# Patient Record
Sex: MALE | Race: WHITE | NOT HISPANIC OR LATINO | Employment: OTHER | URBAN - NONMETROPOLITAN AREA
[De-identification: names, ages, dates, MRNs, and addresses within clinical notes are randomized per-mention and may not be internally consistent; named-entity substitution may affect disease eponyms.]

---

## 2023-03-02 ENCOUNTER — TRANSCRIBE ORDERS (OUTPATIENT)
Dept: ADMINISTRATIVE | Facility: HOSPITAL | Age: 68
End: 2023-03-02
Payer: MEDICARE

## 2023-03-02 DIAGNOSIS — R10.11 RUQ PAIN: Primary | ICD-10-CM

## 2023-03-16 ENCOUNTER — TRANSCRIBE ORDERS (OUTPATIENT)
Dept: ADMINISTRATIVE | Facility: HOSPITAL | Age: 68
End: 2023-03-16
Payer: MEDICARE

## 2023-03-16 DIAGNOSIS — Z82.49 FAMILY HISTORY OF ISCHEMIC HEART DISEASE: Primary | ICD-10-CM

## 2023-03-16 DIAGNOSIS — E78.5 HYPERLIPIDEMIA, UNSPECIFIED HYPERLIPIDEMIA TYPE: ICD-10-CM

## 2023-03-16 DIAGNOSIS — R93.1 ELEVATED CORONARY ARTERY CALCIUM SCORE: ICD-10-CM

## 2023-03-21 ENCOUNTER — TRANSCRIBE ORDERS (OUTPATIENT)
Dept: ADMINISTRATIVE | Facility: HOSPITAL | Age: 68
End: 2023-03-21
Payer: MEDICARE

## 2023-03-21 ENCOUNTER — HOSPITAL ENCOUNTER (OUTPATIENT)
Dept: NUCLEAR MEDICINE | Facility: HOSPITAL | Age: 68
Discharge: HOME | End: 2023-03-21
Payer: MEDICARE

## 2023-03-21 DIAGNOSIS — R06.02 SHORTNESS OF BREATH: ICD-10-CM

## 2023-03-21 DIAGNOSIS — I25.10 DISEASE OF CARDIOVASCULAR SYSTEM: Primary | ICD-10-CM

## 2023-03-21 DIAGNOSIS — R10.11 RUQ PAIN: ICD-10-CM

## 2023-03-21 PROCEDURE — 78226 HEPATOBILIARY SYSTEM IMAGING: CPT

## 2023-03-21 PROCEDURE — 0 TECHNETIUM TC 99M MEBROFENIN KIT: Performed by: PHYSICIAN ASSISTANT

## 2023-03-21 PROCEDURE — A9537 TC99M MEBROFENIN: HCPCS | Performed by: PHYSICIAN ASSISTANT

## 2023-03-21 RX ORDER — KIT FOR THE PREPARATION OF TECHNETIUM TC 99M MEBROFENIN 45 MG/10ML
1 INJECTION, POWDER, LYOPHILIZED, FOR SOLUTION INTRAVENOUS
Status: COMPLETED | OUTPATIENT
Start: 2023-03-21 | End: 2023-03-21

## 2023-03-21 RX ADMIN — MEBROFENIN 1 DOSE: 45 INJECTION, POWDER, LYOPHILIZED, FOR SOLUTION INTRAVENOUS at 12:38

## 2023-04-11 ENCOUNTER — HOSPITAL ENCOUNTER (OUTPATIENT)
Dept: CARDIOLOGY | Facility: HOSPITAL | Age: 68
Discharge: HOME | End: 2023-04-11
Payer: MEDICARE

## 2023-04-11 VITALS
DIASTOLIC BLOOD PRESSURE: 94 MMHG | WEIGHT: 235.67 LBS | HEART RATE: 62 BPM | BODY MASS INDEX: 34.91 KG/M2 | SYSTOLIC BLOOD PRESSURE: 157 MMHG | HEIGHT: 69 IN

## 2023-04-11 LAB
BH CV NUCLEAR PRIOR STUDY: 3
BH CV REST NUCLEAR ISOTOPE DOSE: 11.3 MCI
BH CV STRESS BP STAGE 1: NORMAL
BH CV STRESS COMMENTS STAGE 1: NORMAL
BH CV STRESS DOSE REGADENOSON STAGE 1: 0.4
BH CV STRESS DURATION MIN STAGE 1: 0
BH CV STRESS DURATION SEC STAGE 1: 10
BH CV STRESS HR STAGE 1: 88
BH CV STRESS NUCLEAR ISOTOPE DOSE: 31.5 MCI
BH CV STRESS PROTOCOL 1: NORMAL
BH CV STRESS RECOVERY BP: NORMAL MMHG
BH CV STRESS RECOVERY HR: 84 BPM
BH CV STRESS STAGE 1: 1
LV EF NUC BP: 53 %
MAXIMAL PREDICTED HEART RATE: 153 BPM
PERCENT MAX PREDICTED HR: 57.52 %
STRESS BASELINE BP: NORMAL MMHG
STRESS BASELINE HR: 76 BPM
STRESS PERCENT HR: 68 %
STRESS POST EXERCISE DUR MIN: 0 MIN
STRESS POST EXERCISE DUR SEC: 10 SEC
STRESS POST PEAK BP: NORMAL MMHG
STRESS POST PEAK HR: 88 BPM
STRESS TARGET HR: 130 BPM

## 2023-04-11 PROCEDURE — 93017 CV STRESS TEST TRACING ONLY: CPT

## 2023-04-11 PROCEDURE — A9502 TC99M TETROFOSMIN: HCPCS | Performed by: NURSE PRACTITIONER

## 2023-04-11 PROCEDURE — 78452 HT MUSCLE IMAGE SPECT MULT: CPT

## 2023-04-11 PROCEDURE — 78452 HT MUSCLE IMAGE SPECT MULT: CPT | Performed by: INTERNAL MEDICINE

## 2023-04-11 PROCEDURE — 93018 CV STRESS TEST I&R ONLY: CPT | Performed by: INTERNAL MEDICINE

## 2023-04-11 PROCEDURE — 0 TECHNETIUM TETROFOSMIN KIT: Performed by: NURSE PRACTITIONER

## 2023-04-11 PROCEDURE — 25010000002 REGADENOSON 0.4 MG/5ML SOLUTION: Performed by: INTERNAL MEDICINE

## 2023-04-11 RX ADMIN — TETROFOSMIN 1 DOSE: 1.38 INJECTION, POWDER, LYOPHILIZED, FOR SOLUTION INTRAVENOUS at 09:15

## 2023-04-11 RX ADMIN — REGADENOSON 0.4 MG: 0.08 INJECTION, SOLUTION INTRAVENOUS at 09:13

## 2023-04-11 RX ADMIN — TETROFOSMIN 1 DOSE: 1.38 INJECTION, POWDER, LYOPHILIZED, FOR SOLUTION INTRAVENOUS at 07:39

## 2023-04-12 ENCOUNTER — TELEPHONE (OUTPATIENT)
Dept: CARDIOLOGY | Facility: CLINIC | Age: 68
End: 2023-04-12

## 2023-04-12 NOTE — TELEPHONE ENCOUNTER
Caller: JOE WALTER    Relationship: SELF    Best call back number: [phone] - LEAVE MESSAGE IF NO ANSWER    What is the best time to reach you: ANYTIME    Who are you requesting to speak with (clinical staff, provider,  specific staff member): ANY    Do you know the name of the person who called:     What was the call regarding: PATIENT CALLED IN ABOUT MAY 16TH APPT - PATIENT IS WORKING THE ELECTION POLLS.. PATIENT ISN'T ABLE TO DO THE DAY HIS ORIGINAL APPT WAS RESCHEDULED TO. UNABLE TO GET PATIENT APPOINTMENT UNTIL END OF June. PLEASE ADVISE IF PATIENT CAN BE ADDED TO THE SCHEDULE OR CHANGED WITH ANOTHER PATIENT FOR APPT. ORIGINAL RESCHEDULED APPT IS STILL ON THE BOOKS. PLEASE ADVISE.      PATIENT ALSO ADVISES THAT THEY HAVE SCHOOL April 26TH AND PATIENT IS WAITING TO GET GALLBLADDER REMOVED AS WELL.     THANK YOU    Do you require a callback: YES

## 2023-04-19 ENCOUNTER — TELEPHONE (OUTPATIENT)
Dept: CARDIOLOGY | Facility: CLINIC | Age: 68
End: 2023-04-19
Payer: MEDICARE

## 2023-04-21 ENCOUNTER — TELEPHONE (OUTPATIENT)
Dept: CARDIOLOGY | Facility: CLINIC | Age: 68
End: 2023-04-21
Payer: MEDICARE

## 2023-04-21 NOTE — TELEPHONE ENCOUNTER
RECORDS RECEIVED FROM [facility] ON PT NEEDING HEART CATH - PLEASE REVIEW / ORDER

## 2023-04-22 DIAGNOSIS — I25.118 CORONARY ARTERY DISEASE OF NATIVE ARTERY OF NATIVE HEART WITH STABLE ANGINA PECTORIS: Primary | ICD-10-CM

## 2023-04-22 RX ORDER — SODIUM CHLORIDE 0.9 % (FLUSH) 0.9 %
3 SYRINGE (ML) INJECTION EVERY 12 HOURS SCHEDULED
OUTPATIENT
Start: 2023-04-22

## 2023-04-22 RX ORDER — SODIUM CHLORIDE 0.9 % (FLUSH) 0.9 %
10 SYRINGE (ML) INJECTION AS NEEDED
OUTPATIENT
Start: 2023-04-22

## 2023-04-22 RX ORDER — SODIUM CHLORIDE 9 MG/ML
75 INJECTION, SOLUTION INTRAVENOUS CONTINUOUS
OUTPATIENT
Start: 2023-04-22

## 2023-04-25 PROBLEM — I25.118 CORONARY ARTERY DISEASE OF NATIVE ARTERY OF NATIVE HEART WITH STABLE ANGINA PECTORIS: Status: ACTIVE | Noted: 2023-04-25

## 2023-04-30 NOTE — PROGRESS NOTES
"EP NEW PATIENT VISIT    Chief Complaint  Irregular Heart Beat (NP)    Subjective        History of Present Illness    EP Problems:  1.  Frequent PVCs  - Cardiac crux  - ? 23% PVC burden  - 3/2023:  PVC <1% on holter    Cardiology Problems:  1.  ? CAD:  - 4/2023:  Stress test with septal infarction, no ischemia, elevated calcium CT score  2.  HTN    Atilio Skelton is a 67 y.o. male with problem list as above who presents to the clinic for evaluation of frequent PVCs.  He was initially sent for evaluation to cardiology in [place] for suspected bradycardia.  Evaluation at that time revealed likely PVCs causing false readings of bradycardia.  There was a reported 23% PVC burden seen (unclear study) followed by a holter monitor revealing <1% PVCs.  He has had prior echocardiogram revealing normal EF as well as stress testing which revealed possible septal infarction without ischemia.  He denies any significant symptoms of fatigue, shortness of breath, lightheadedness or dizziness.  He states that he was recently on a hunting trip and was able to walk for about 5 miles with normal exertion.    Objective   Vital Signs:  /72 (BP Location: Right arm, Patient Position: Sitting)   Pulse 74   Resp 18   Ht 175.3 cm (69\")   Wt 105 kg (232 lb)   SpO2 98%   BMI 34.26 kg/m²   Estimated body mass index is 34.26 kg/m² as calculated from the following:    Height as of this encounter: 175.3 cm (69\").    Weight as of this encounter: 105 kg (232 lb).      Physical Exam  Vitals reviewed.   Constitutional:       Appearance: Normal appearance. He is obese.   HENT:      Head: Normocephalic and atraumatic.   Eyes:      Extraocular Movements: Extraocular movements intact.      Conjunctiva/sclera: Conjunctivae normal.   Cardiovascular:      Rate and Rhythm: Normal rate. Rhythm irregular.      Pulses: Normal pulses.      Heart sounds: Normal heart sounds.   Pulmonary:      Effort: Pulmonary effort is normal.      Breath " sounds: Normal breath sounds.   Musculoskeletal:         General: No swelling.   Neurological:      General: No focal deficit present.      Mental Status: He is alert and oriented to person, place, and time.   Psychiatric:         Mood and Affect: Mood normal.         Judgment: Judgment normal.        Result Review :  The following data was reviewed by: Malvin Brandon MD on 05/02/2023:    3/2023: Prior Holter monitor with less than 1% PVC burden  3/2023 stress test: Possible septal infarction, normal EF  3/24/2023: Echo: EF 55-60               Assessment and Plan   Diagnoses and all orders for this visit:    1. Frequent PVCs (Primary)    Other orders  -     metoprolol tartrate (LOPRESSOR) 25 MG tablet; Take 1 tablet by mouth 2 (Two) Times a Day.  Dispense: 60 tablet; Refill: 11        Atilio Skelton is a 67 y.o. male with problem list as above who presents to the clinic for evaluation of frequent PVCs.  His PVC frequency has been quite variable, at times reportedly up to 23% and other times less than 1%.  His ECG today shows frequent PVCs and as such, I suspect that he is still having a significant burden.  With this in mind, I do think that our best treatment option would be to start him on metoprolol for attempted PVC suppression to try to prevent complications of frequent PVCs such as PVC mediated cardiomyopathy.  Given minimal symptoms, I do not think more aggressive measures are necessary at this time.  His PVCs look to be coming from the cardiac crux.  Ablation of this PVC is possible though sometimes challenging given close proximity to the normal conduction system.    Plan:  -Start metoprolol 25 mg twice daily  -Follow-up in clinic in 3 months to reassess burden  -No additional medication changes at this time  -Advised to call me should his symptoms be worsening           Follow Up   Return in about 3 months (around 8/2/2023).  Patient was given instructions and counseling regarding his condition or for  health maintenance advice. Please see specific information pulled into the AVS if appropriate.     Part of this note may be an electronic transcription/translation of spoken language to printed text using the Dragon Dictation System.

## 2023-05-02 ENCOUNTER — OFFICE VISIT (OUTPATIENT)
Dept: CARDIOLOGY | Facility: CLINIC | Age: 68
End: 2023-05-02
Payer: MEDICARE

## 2023-05-02 VITALS
RESPIRATION RATE: 18 BRPM | SYSTOLIC BLOOD PRESSURE: 114 MMHG | OXYGEN SATURATION: 98 % | HEIGHT: 69 IN | WEIGHT: 232 LBS | DIASTOLIC BLOOD PRESSURE: 72 MMHG | BODY MASS INDEX: 34.36 KG/M2 | HEART RATE: 74 BPM

## 2023-05-02 DIAGNOSIS — I49.3 FREQUENT PVCS: Primary | ICD-10-CM

## 2023-05-02 RX ORDER — ALBUTEROL SULFATE 90 UG/1
AEROSOL, METERED RESPIRATORY (INHALATION)
COMMUNITY

## 2023-05-02 RX ORDER — FAMOTIDINE 40 MG/1
TABLET, FILM COATED ORAL
COMMUNITY

## 2023-05-02 RX ORDER — NETARSUDIL 0.2 MG/ML
SOLUTION/ DROPS OPHTHALMIC; TOPICAL
COMMUNITY

## 2023-05-02 RX ORDER — LATANOPROST 50 UG/ML
SOLUTION/ DROPS OPHTHALMIC
COMMUNITY

## 2023-05-02 RX ORDER — DORZOLAMIDE HYDROCHLORIDE AND TIMOLOL MALEATE 20; 5 MG/ML; MG/ML
SOLUTION/ DROPS OPHTHALMIC
COMMUNITY

## 2023-05-02 RX ORDER — ROSUVASTATIN CALCIUM 10 MG/1
TABLET, COATED ORAL
COMMUNITY

## 2023-05-05 ENCOUNTER — HOSPITAL ENCOUNTER (OUTPATIENT)
Facility: HOSPITAL | Age: 68
Setting detail: HOSPITAL OUTPATIENT SURGERY
Discharge: HOME | End: 2023-05-05
Attending: INTERNAL MEDICINE | Admitting: INTERNAL MEDICINE
Payer: MEDICARE

## 2023-05-05 VITALS
HEART RATE: 64 BPM | HEIGHT: 69 IN | BODY MASS INDEX: 34.36 KG/M2 | TEMPERATURE: 96.5 F | RESPIRATION RATE: 14 BRPM | SYSTOLIC BLOOD PRESSURE: 158 MMHG | DIASTOLIC BLOOD PRESSURE: 92 MMHG | OXYGEN SATURATION: 96 % | WEIGHT: 232 LBS

## 2023-05-05 DIAGNOSIS — I25.118 CORONARY ARTERY DISEASE OF NATIVE ARTERY OF NATIVE HEART WITH STABLE ANGINA PECTORIS: ICD-10-CM

## 2023-05-05 LAB
ALBUMIN SERPL-MCNC: 4.9 G/DL (ref 3.5–5.2)
ALBUMIN/GLOB SERPL: 2 G/DL
ALP SERPL-CCNC: 89 U/L (ref 39–117)
ALT SERPL W P-5'-P-CCNC: 17 U/L (ref 1–41)
ANION GAP SERPL CALCULATED.3IONS-SCNC: 8 MMOL/L (ref 5–15)
AST SERPL-CCNC: 20 U/L (ref 1–40)
BASOPHILS # BLD AUTO: 0.05 10*3/MM3 (ref 0–0.2)
BASOPHILS NFR BLD AUTO: 0.8 % (ref 0–1.5)
BILIRUB SERPL-MCNC: 1.4 MG/DL (ref 0–1.2)
BUN SERPL-MCNC: 9 MG/DL (ref 8–23)
BUN/CREAT SERPL: 9.5 (ref 7–25)
CALCIUM SPEC-SCNC: 9.5 MG/DL (ref 8.6–10.5)
CHLORIDE SERPL-SCNC: 105 MMOL/L (ref 98–107)
CO2 SERPL-SCNC: 28 MMOL/L (ref 22–29)
CREAT SERPL-MCNC: 0.95 MG/DL (ref 0.76–1.27)
DEPRECATED RDW RBC AUTO: 50.2 FL (ref 37–54)
EGFRCR SERPLBLD CKD-EPI 2021: 87.7 ML/MIN/1.73
EOSINOPHIL # BLD AUTO: 0.21 10*3/MM3 (ref 0–0.4)
EOSINOPHIL NFR BLD AUTO: 3.2 % (ref 0.3–6.2)
ERYTHROCYTE [DISTWIDTH] IN BLOOD BY AUTOMATED COUNT: 13.6 % (ref 12.3–15.4)
GLOBULIN UR ELPH-MCNC: 2.4 GM/DL
GLUCOSE SERPL-MCNC: 92 MG/DL (ref 65–99)
HCT VFR BLD AUTO: 50.1 % (ref 37.5–51)
HGB BLD-MCNC: 15.8 G/DL (ref 13–17.7)
IMM GRANULOCYTES # BLD AUTO: 0.02 10*3/MM3 (ref 0–0.05)
IMM GRANULOCYTES NFR BLD AUTO: 0.3 % (ref 0–0.5)
INR PPP: 0.93 (ref 0.91–1.09)
LYMPHOCYTES # BLD AUTO: 1.62 10*3/MM3 (ref 0.7–3.1)
LYMPHOCYTES NFR BLD AUTO: 24.4 % (ref 19.6–45.3)
MCH RBC QN AUTO: 31.4 PG (ref 26.6–33)
MCHC RBC AUTO-ENTMCNC: 31.5 G/DL (ref 31.5–35.7)
MCV RBC AUTO: 99.6 FL (ref 79–97)
MONOCYTES # BLD AUTO: 0.68 10*3/MM3 (ref 0.1–0.9)
MONOCYTES NFR BLD AUTO: 10.3 % (ref 5–12)
NEUTROPHILS NFR BLD AUTO: 4.05 10*3/MM3 (ref 1.7–7)
NEUTROPHILS NFR BLD AUTO: 61 % (ref 42.7–76)
NRBC BLD AUTO-RTO: 0 /100 WBC (ref 0–0.2)
PLATELET # BLD AUTO: 194 10*3/MM3 (ref 140–450)
PMV BLD AUTO: 10.8 FL (ref 6–12)
POTASSIUM SERPL-SCNC: 4.7 MMOL/L (ref 3.5–5.2)
PROT SERPL-MCNC: 7.3 G/DL (ref 6–8.5)
PROTHROMBIN TIME: 12.6 SECONDS (ref 11.8–14.8)
RBC # BLD AUTO: 5.03 10*6/MM3 (ref 4.14–5.8)
SODIUM SERPL-SCNC: 141 MMOL/L (ref 136–145)
WBC NRBC COR # BLD: 6.63 10*3/MM3 (ref 3.4–10.8)

## 2023-05-05 PROCEDURE — 85610 PROTHROMBIN TIME: CPT | Performed by: INTERNAL MEDICINE

## 2023-05-05 PROCEDURE — 85025 COMPLETE CBC W/AUTO DIFF WBC: CPT | Performed by: INTERNAL MEDICINE

## 2023-05-05 PROCEDURE — 80053 COMPREHEN METABOLIC PANEL: CPT | Performed by: INTERNAL MEDICINE

## 2023-05-05 RX ORDER — SODIUM CHLORIDE 9 MG/ML
75 INJECTION, SOLUTION INTRAVENOUS CONTINUOUS
Status: DISCONTINUED | OUTPATIENT
Start: 2023-05-05 | End: 2023-05-05 | Stop reason: HOSPADM

## 2023-05-05 RX ORDER — SODIUM CHLORIDE 0.9 % (FLUSH) 0.9 %
10 SYRINGE (ML) INJECTION AS NEEDED
Status: DISCONTINUED | OUTPATIENT
Start: 2023-05-05 | End: 2023-05-05 | Stop reason: HOSPADM

## 2023-05-05 RX ORDER — SODIUM CHLORIDE 0.9 % (FLUSH) 0.9 %
3 SYRINGE (ML) INJECTION EVERY 12 HOURS SCHEDULED
Status: DISCONTINUED | OUTPATIENT
Start: 2023-05-05 | End: 2023-05-05 | Stop reason: HOSPADM

## 2023-05-05 RX ADMIN — SODIUM CHLORIDE 75 ML/HR: 9 INJECTION, SOLUTION INTRAVENOUS at 12:51

## 2023-05-05 NOTE — H&P
Please refer to my notes from [facility]  Patient overall has excellent effort tolerance risks has frequent PVCs  Has seen Dr. Brandon  Short-term beta-blocker therapy  Has had intermittent diarrhea  Excellent effort tolerance  Discussed with patient as well as his wife  Will cleared patient for gallbladder surgery based on low risk nuclear stress test and excellent effort tolerance  Continue on beta-blocker therapy  Acceptable cardiovascular risk of cholecystectomy  Consider coronary angiography as required after repeat risk assessment after cholecystectomy

## 2023-05-31 PROCEDURE — 88304 TISSUE EXAM BY PATHOLOGIST: CPT | Performed by: SURGERY

## 2023-06-01 ENCOUNTER — LAB REQUISITION (OUTPATIENT)
Dept: LAB | Facility: HOSPITAL | Age: 68
End: 2023-06-01
Payer: MEDICARE

## 2023-06-02 LAB
CYTO UR: NORMAL
LAB AP CASE REPORT: NORMAL
LAB AP CLINICAL INFORMATION: NORMAL
Lab: NORMAL
PATH REPORT.FINAL DX SPEC: NORMAL
PATH REPORT.GROSS SPEC: NORMAL

## 2023-08-03 ENCOUNTER — OFFICE VISIT (OUTPATIENT)
Dept: CARDIOLOGY | Facility: CLINIC | Age: 68
End: 2023-08-03
Payer: MEDICARE

## 2023-08-03 VITALS
DIASTOLIC BLOOD PRESSURE: 62 MMHG | WEIGHT: 228 LBS | BODY MASS INDEX: 33.77 KG/M2 | HEIGHT: 69 IN | OXYGEN SATURATION: 99 % | SYSTOLIC BLOOD PRESSURE: 108 MMHG | HEART RATE: 55 BPM

## 2023-08-03 DIAGNOSIS — I25.118 CORONARY ARTERY DISEASE OF NATIVE ARTERY OF NATIVE HEART WITH STABLE ANGINA PECTORIS: ICD-10-CM

## 2023-08-03 DIAGNOSIS — I49.3 PVC (PREMATURE VENTRICULAR CONTRACTION): Primary | ICD-10-CM

## 2024-01-12 ENCOUNTER — TELEPHONE (OUTPATIENT)
Dept: CARDIOLOGY | Facility: CLINIC | Age: 69
End: 2024-01-12
Payer: MEDICARE

## 2024-01-12 DIAGNOSIS — R06.02 SHORT OF BREATH ON EXERTION: ICD-10-CM

## 2024-01-12 DIAGNOSIS — R94.39 ABNORMAL NUCLEAR STRESS TEST: Primary | ICD-10-CM

## 2024-01-12 RX ORDER — SODIUM CHLORIDE 0.9 % (FLUSH) 0.9 %
10 SYRINGE (ML) INJECTION AS NEEDED
OUTPATIENT
Start: 2024-01-12

## 2024-01-12 RX ORDER — SODIUM CHLORIDE 0.9 % (FLUSH) 0.9 %
3 SYRINGE (ML) INJECTION EVERY 12 HOURS SCHEDULED
OUTPATIENT
Start: 2024-01-12

## 2024-01-12 RX ORDER — SODIUM CHLORIDE 9 MG/ML
40 INJECTION, SOLUTION INTRAVENOUS AS NEEDED
OUTPATIENT
Start: 2024-01-12

## 2024-01-12 NOTE — TELEPHONE ENCOUNTER
RECORDS RECEIVED FROM [facility] ON PT NEEDING HEART CATH    PLEASE REVIEW / ORDER

## 2024-01-17 PROBLEM — R94.39 ABNORMAL NUCLEAR STRESS TEST: Status: ACTIVE | Noted: 2024-01-12

## 2024-02-05 ENCOUNTER — HOSPITAL ENCOUNTER (OUTPATIENT)
Facility: HOSPITAL | Age: 69
Setting detail: HOSPITAL OUTPATIENT SURGERY
Discharge: HOME | End: 2024-02-05
Attending: INTERNAL MEDICINE | Admitting: INTERNAL MEDICINE
Payer: MEDICARE

## 2024-02-05 VITALS
SYSTOLIC BLOOD PRESSURE: 110 MMHG | RESPIRATION RATE: 17 BRPM | DIASTOLIC BLOOD PRESSURE: 56 MMHG | BODY MASS INDEX: 33.77 KG/M2 | HEART RATE: 64 BPM | TEMPERATURE: 97.1 F | HEIGHT: 69 IN | WEIGHT: 228 LBS | OXYGEN SATURATION: 99 %

## 2024-02-05 DIAGNOSIS — R94.39 ABNORMAL NUCLEAR STRESS TEST: ICD-10-CM

## 2024-02-05 DIAGNOSIS — I25.10 CAD, MULTIPLE VESSEL: Primary | ICD-10-CM

## 2024-02-05 DIAGNOSIS — R06.02 SHORT OF BREATH ON EXERTION: ICD-10-CM

## 2024-02-05 LAB
ALBUMIN SERPL-MCNC: 4.4 G/DL (ref 3.5–5.2)
ALBUMIN/GLOB SERPL: 1.6 G/DL
ALP SERPL-CCNC: 133 U/L (ref 39–117)
ALT SERPL W P-5'-P-CCNC: 45 U/L (ref 1–41)
ANION GAP SERPL CALCULATED.3IONS-SCNC: 10 MMOL/L (ref 5–15)
AST SERPL-CCNC: 25 U/L (ref 1–40)
BASOPHILS # BLD AUTO: 0.06 10*3/MM3 (ref 0–0.2)
BASOPHILS NFR BLD AUTO: 0.9 % (ref 0–1.5)
BILIRUB SERPL-MCNC: 0.9 MG/DL (ref 0–1.2)
BUN SERPL-MCNC: 15 MG/DL (ref 8–23)
BUN/CREAT SERPL: 16 (ref 7–25)
CALCIUM SPEC-SCNC: 9.4 MG/DL (ref 8.6–10.5)
CHLORIDE SERPL-SCNC: 104 MMOL/L (ref 98–107)
CO2 SERPL-SCNC: 26 MMOL/L (ref 22–29)
CREAT SERPL-MCNC: 0.94 MG/DL (ref 0.76–1.27)
DEPRECATED RDW RBC AUTO: 45.9 FL (ref 37–54)
EGFRCR SERPLBLD CKD-EPI 2021: 88.3 ML/MIN/1.73
EOSINOPHIL # BLD AUTO: 0.21 10*3/MM3 (ref 0–0.4)
EOSINOPHIL NFR BLD AUTO: 3.1 % (ref 0.3–6.2)
ERYTHROCYTE [DISTWIDTH] IN BLOOD BY AUTOMATED COUNT: 12.9 % (ref 12.3–15.4)
GLOBULIN UR ELPH-MCNC: 2.8 GM/DL
GLUCOSE SERPL-MCNC: 87 MG/DL (ref 65–99)
HCT VFR BLD AUTO: 48.8 % (ref 37.5–51)
HGB BLD-MCNC: 15.6 G/DL (ref 13–17.7)
IMM GRANULOCYTES # BLD AUTO: 0.02 10*3/MM3 (ref 0–0.05)
IMM GRANULOCYTES NFR BLD AUTO: 0.3 % (ref 0–0.5)
INR PPP: 0.95 (ref 0.91–1.09)
LYMPHOCYTES # BLD AUTO: 0.99 10*3/MM3 (ref 0.7–3.1)
LYMPHOCYTES NFR BLD AUTO: 14.7 % (ref 19.6–45.3)
MCH RBC QN AUTO: 31.1 PG (ref 26.6–33)
MCHC RBC AUTO-ENTMCNC: 32 G/DL (ref 31.5–35.7)
MCV RBC AUTO: 97.4 FL (ref 79–97)
MONOCYTES # BLD AUTO: 0.6 10*3/MM3 (ref 0.1–0.9)
MONOCYTES NFR BLD AUTO: 8.9 % (ref 5–12)
NEUTROPHILS NFR BLD AUTO: 4.84 10*3/MM3 (ref 1.7–7)
NEUTROPHILS NFR BLD AUTO: 72.1 % (ref 42.7–76)
NRBC BLD AUTO-RTO: 0 /100 WBC (ref 0–0.2)
PLATELET # BLD AUTO: 169 10*3/MM3 (ref 140–450)
PMV BLD AUTO: 11.3 FL (ref 6–12)
POTASSIUM SERPL-SCNC: 4.6 MMOL/L (ref 3.5–5.2)
PROT SERPL-MCNC: 7.2 G/DL (ref 6–8.5)
PROTHROMBIN TIME: 12.8 SECONDS (ref 11.8–14.8)
RBC # BLD AUTO: 5.01 10*6/MM3 (ref 4.14–5.8)
SODIUM SERPL-SCNC: 140 MMOL/L (ref 136–145)
WBC NRBC COR # BLD AUTO: 6.72 10*3/MM3 (ref 3.4–10.8)

## 2024-02-05 PROCEDURE — 93459 L HRT ART/GRFT ANGIO: CPT | Performed by: INTERNAL MEDICINE

## 2024-02-05 PROCEDURE — 85610 PROTHROMBIN TIME: CPT | Performed by: INTERNAL MEDICINE

## 2024-02-05 PROCEDURE — 25010000002 FENTANYL CITRATE (PF) 50 MCG/ML SOLUTION: Performed by: INTERNAL MEDICINE

## 2024-02-05 PROCEDURE — 25010000002 DIPHENHYDRAMINE PER 50 MG: Performed by: INTERNAL MEDICINE

## 2024-02-05 PROCEDURE — 80053 COMPREHEN METABOLIC PANEL: CPT | Performed by: INTERNAL MEDICINE

## 2024-02-05 PROCEDURE — 25510000001 IOPAMIDOL PER 1 ML: Performed by: INTERNAL MEDICINE

## 2024-02-05 PROCEDURE — 99152 MOD SED SAME PHYS/QHP 5/>YRS: CPT | Performed by: INTERNAL MEDICINE

## 2024-02-05 PROCEDURE — S0260 H&P FOR SURGERY: HCPCS | Performed by: INTERNAL MEDICINE

## 2024-02-05 PROCEDURE — 93458 L HRT ARTERY/VENTRICLE ANGIO: CPT | Performed by: INTERNAL MEDICINE

## 2024-02-05 PROCEDURE — C1894 INTRO/SHEATH, NON-LASER: HCPCS | Performed by: INTERNAL MEDICINE

## 2024-02-05 PROCEDURE — C1769 GUIDE WIRE: HCPCS | Performed by: INTERNAL MEDICINE

## 2024-02-05 PROCEDURE — 25010000002 MIDAZOLAM PER 1 MG: Performed by: INTERNAL MEDICINE

## 2024-02-05 PROCEDURE — 85025 COMPLETE CBC W/AUTO DIFF WBC: CPT | Performed by: INTERNAL MEDICINE

## 2024-02-05 RX ORDER — SODIUM CHLORIDE 0.9 % (FLUSH) 0.9 %
10 SYRINGE (ML) INJECTION AS NEEDED
Status: CANCELLED | OUTPATIENT
Start: 2024-02-05

## 2024-02-05 RX ORDER — SODIUM CHLORIDE 0.9 % (FLUSH) 0.9 %
10 SYRINGE (ML) INJECTION AS NEEDED
Status: DISCONTINUED | OUTPATIENT
Start: 2024-02-05 | End: 2024-02-05 | Stop reason: HOSPADM

## 2024-02-05 RX ORDER — SODIUM CHLORIDE 9 MG/ML
40 INJECTION, SOLUTION INTRAVENOUS AS NEEDED
Status: DISCONTINUED | OUTPATIENT
Start: 2024-02-05 | End: 2024-02-05 | Stop reason: HOSPADM

## 2024-02-05 RX ORDER — AMOXICILLIN 875 MG/1
875 TABLET, COATED ORAL 2 TIMES DAILY
COMMUNITY
End: 2024-02-12

## 2024-02-05 RX ORDER — FENTANYL CITRATE 50 UG/ML
INJECTION, SOLUTION INTRAMUSCULAR; INTRAVENOUS
Status: DISCONTINUED | OUTPATIENT
Start: 2024-02-05 | End: 2024-02-05 | Stop reason: HOSPADM

## 2024-02-05 RX ORDER — MULTIPLE VITAMINS W/ MINERALS TAB 9MG-400MCG
1 TAB ORAL DAILY
COMMUNITY

## 2024-02-05 RX ORDER — SODIUM CHLORIDE 9 MG/ML
100 INJECTION, SOLUTION INTRAVENOUS CONTINUOUS
Status: CANCELLED | OUTPATIENT
Start: 2024-02-05

## 2024-02-05 RX ORDER — MIDAZOLAM HYDROCHLORIDE 1 MG/ML
INJECTION INTRAMUSCULAR; INTRAVENOUS
Status: DISCONTINUED | OUTPATIENT
Start: 2024-02-05 | End: 2024-02-05 | Stop reason: HOSPADM

## 2024-02-05 RX ORDER — SODIUM CHLORIDE 9 MG/ML
40 INJECTION, SOLUTION INTRAVENOUS AS NEEDED
Status: CANCELLED | OUTPATIENT
Start: 2024-02-05

## 2024-02-05 RX ORDER — SODIUM CHLORIDE 0.9 % (FLUSH) 0.9 %
3 SYRINGE (ML) INJECTION EVERY 12 HOURS SCHEDULED
Status: DISCONTINUED | OUTPATIENT
Start: 2024-02-05 | End: 2024-02-05 | Stop reason: HOSPADM

## 2024-02-05 RX ORDER — ASPIRIN 81 MG/1
324 TABLET, CHEWABLE ORAL ONCE
Status: COMPLETED | OUTPATIENT
Start: 2024-02-05 | End: 2024-02-05

## 2024-02-05 RX ORDER — ACETAMINOPHEN 325 MG/1
650 TABLET ORAL EVERY 4 HOURS PRN
Status: CANCELLED | OUTPATIENT
Start: 2024-02-05

## 2024-02-05 RX ORDER — ASPIRIN 81 MG/1
TABLET, CHEWABLE ORAL
Status: COMPLETED
Start: 2024-02-05 | End: 2024-02-05

## 2024-02-05 RX ORDER — ASPIRIN 81 MG/1
81 TABLET, CHEWABLE ORAL DAILY
COMMUNITY

## 2024-02-05 RX ORDER — DIPHENHYDRAMINE HYDROCHLORIDE 50 MG/ML
INJECTION INTRAMUSCULAR; INTRAVENOUS
Status: DISCONTINUED | OUTPATIENT
Start: 2024-02-05 | End: 2024-02-05 | Stop reason: HOSPADM

## 2024-02-05 RX ORDER — SODIUM CHLORIDE 0.9 % (FLUSH) 0.9 %
10 SYRINGE (ML) INJECTION EVERY 12 HOURS SCHEDULED
Status: CANCELLED | OUTPATIENT
Start: 2024-02-05

## 2024-02-05 RX ORDER — LOSARTAN POTASSIUM 25 MG/1
25 TABLET ORAL DAILY
COMMUNITY

## 2024-02-05 RX ORDER — MULTIVIT WITH MINERALS/LUTEIN
250 TABLET ORAL DAILY
COMMUNITY

## 2024-02-05 RX ORDER — LIDOCAINE HYDROCHLORIDE 20 MG/ML
INJECTION, SOLUTION INFILTRATION; PERINEURAL
Status: DISCONTINUED | OUTPATIENT
Start: 2024-02-05 | End: 2024-02-05 | Stop reason: HOSPADM

## 2024-02-05 RX ADMIN — ASPIRIN 324 MG: 81 TABLET, CHEWABLE ORAL at 12:07

## 2024-02-05 NOTE — H&P
LOS: 0 days   Patient Care Team:  Zaheer Interiano as PCP - General (Physician Assistant)    Chief Complaint: Shortness of breath     Subjective    Atilio Skelton is a 68 y.o. male who is being seen      Subjective    Mild chronic exertional shortness of breath on exertion relieved with rest  No significant cough or wheezing    No palpitations  No associated chest pain  No significant pedal edema    No fever or chills  No significant expectoration    No hemoptysis  No presyncope or syncope    Tolerating current medications well with no untoward side effects   Compliant with prescribed medication regimen. Tries to adhere to cardiac diet.   Abnormal cardiac stress test raising suspicion for underlying hemodynamically significant obstructive coronary artery disease .      No anticipated endoscopic or any surgical procedures over the next 1 year    No bleeding, excessive bruising, gait instability or fall risks      Review of Systems   Constitutional: No chills   Has fatigue   No fever.   HENT: Negative.    Eyes: Negative.    Respiratory: Negative for cough,   No chest wall soreness,   Shortness of breath,   no wheezing, no stridor.    Cardiovascular: As above  Gastrointestinal: Negative for abdominal distention,  No abdominal pain,   No blood in stool,   No constipation,   No diarrhea,   No nausea   No vomiting.   Endocrine: Negative.    Genitourinary: Negative for difficulty urinating, dysuria, flank pain and hematuria.   Musculoskeletal: Negative.    Skin: Negative for rash and wound.   Allergic/Immunologic: Negative.    Neurological: Negative for dizziness, syncope, weakness,   No light-headedness  No  headaches.   Hematological: Does not bruise/bleed easily.   Psychiatric/Behavioral: Negative for agitation or behavioral problems,   No confusion,   the patient is  nervous/anxious.       History:   Past Medical History:   Diagnosis Date    Arrhythmia     Asthma     Hyperlipidemia      Past Surgical History:  "  Procedure Laterality Date    GALLBLADDER SURGERY      MOUTH SURGERY  01/31/2024    had 5 teeth removed     Social History     Socioeconomic History    Marital status:      History reviewed. No pertinent family history.    Labs:  WBC No results found for: \"WBC\"   HGB No results found for: \"HGB\"   HCT No results found for: \"HCT\"   Platelets No results found for: \"PLT\"   MCV No results found for: \"MCV\"         Invalid input(s): \"LABALBU\", \"PROT\"No results found for: \"CKTOTAL\", \"CKMB\", \"CKMBINDEX\", \"TROPONINI\", \"TROPONINT\"  PT/INR:  No results found for: \"PROTIME\"/No results found for: \"INR\"    Imaging Results (Last 72 Hours)       ** No results found for the last 72 hours. **            Objective     Allergies   Allergen Reactions    Sulfa Antibiotics Rash    Zithromax [Azithromycin] Rash       Medication Review: Performed  Current Facility-Administered Medications   Medication Dose Route Frequency Provider Last Rate Last Admin    sodium chloride 0.9 % flush 10 mL  10 mL Intravenous PRN Ronnie Tuttle MD        sodium chloride 0.9 % flush 3 mL  3 mL Intravenous Q12H Ronnie Tuttle MD        sodium chloride 0.9 % infusion 40 mL  40 mL Intravenous PRRonnie Cruz MD           Vital Sign Min/Max for last 24 hours  Temp  Min: 97.1 °F (36.2 °C)  Max: 97.1 °F (36.2 °C)   BP  Min: 136/93  Max: 136/93   Pulse  Min: 67  Max: 67   Resp  Min: 16  Max: 16   SpO2  Min: 100 %  Max: 100 %   No data recorded   Weight  Min: 103 kg (228 lb)  Max: 103 kg (228 lb)     Flowsheet Rows      Flowsheet Row First Filed Value   Admission Height 175.3 cm (69\") Documented at 02/05/2024 1017   Admission Weight 103 kg (228 lb) Documented at 02/05/2024 1017                Physical Exam:    General Appearance: Awake, alert, in no acute distress  Eyes: Pupils equal and reactive    Ears: Appear intact with no abnormalities noted  Nose: Nares normal, no drainage  Neck: supple, trachea midline, no carotid bruit and no JVD  Back: no kyphosis " present,    Lungs: respirations regular, respirations even and respirations unlabored  Heart: normal S1, S2, no significant murmurs   No gallops or rubs  no rub and no click  Abdomen: normal bowel sounds, no tenderness   Skin: no bleeding, bruising or rash  Extremities: no cyanosis  Psychiatric/Behavioral: Negative for agitation, behavioral problems, confusion, the patient does  appear to be nervous/anxious.       Results Review:   I reviewed the patient's new clinical results.  I reviewed the patient's new imaging results and agree with the interpretation.  I reviewed the patient's other test results and agree with the interpretation  I personally viewed and interpreted the patient's EKG/Telemetry data    Discussed with patient  Updated patient regarding any new or relevant abnormalities on review of records or any new findings on physical exam.   Mentioned to patient about purpose of visit and desirable health short and long term goals and objectives.     Reviewed available prior notes, consults, prior visits, laboratory findings, radiology and cardiology relevant reports.   Updated chart as applicable.   I have reviewed the patient's medical history in detail and updated the computerized patient record as relevant.          Assessment & Plan       Abnormal nuclear stress test  shortness of breath   essential hypertension   Strong family history of early coronary artery disease    Plan      Recommend cardiac catheterization, selective coronary angiography, left ventriculography and percutaneous coronary intervention with application of arteriotomy hemostatic closure device.    I discussed cardiac catheterization, the procedure, risks (including bleeding, infection, vascular damage [including minor oozing, bruising, bleeding, and up to and including but not limited to the need for vascular surgery, emergency cardiothoracic surgery, contrast reaction, renal failure, respiratory failure, heart attack, stroke,  arrhythmia and even death), benefits, and alternatives and the patient has voiced understanding and is willing to proceed.    Adequate pre-hydration and post cardiac catheterization hydration.  Premedications as required and indicated for cardiac catheterization.    No contraindication to drug eluting stent placement if required  Further recommendations pending results of cardiac catheterization   Right radial arterial approach for cardiac cath.        Ronnie Tuttle MD  02/05/24  11:57 CST    EMR Dragon/Transcription was used to dictate part of this note

## 2024-02-05 NOTE — Clinical Note
Hemostasis started on the right radial artery. R-Band was used in achieving hemostasis. Radial compression device applied to vessel. Hemostasis achieved successfully. Closure device additional comment: 15 of air in band for hemostasis

## 2024-02-05 NOTE — Clinical Note
No in lab complications Duration Of Freeze Thaw-Cycle (Seconds): 0 Post-Care Instructions: I reviewed with the patient in detail post-care instructions. Patient is to wear sunprotection, and avoid picking at any of the treated lesions. Pt may apply Vaseline to crusted or scabbing areas. Render Note In Bullet Format When Appropriate: No Detail Level: Detailed Consent: The patient's consent was obtained including but not limited to risks of crusting, scabbing, blistering, scarring, darker or lighter pigmentary change, recurrence, incomplete removal and infection. Show Applicator Variable?: Yes

## 2024-02-12 ENCOUNTER — PREP FOR SURGERY (OUTPATIENT)
Dept: OTHER | Facility: HOSPITAL | Age: 69
End: 2024-02-12
Payer: MEDICARE

## 2024-02-12 ENCOUNTER — TELEPHONE (OUTPATIENT)
Dept: CARDIAC SURGERY | Facility: CLINIC | Age: 69
End: 2024-02-12
Payer: MEDICARE

## 2024-02-12 ENCOUNTER — LAB (OUTPATIENT)
Dept: LAB | Facility: HOSPITAL | Age: 69
End: 2024-02-12
Payer: MEDICARE

## 2024-02-12 ENCOUNTER — OFFICE VISIT (OUTPATIENT)
Dept: CARDIAC SURGERY | Facility: CLINIC | Age: 69
End: 2024-02-12
Payer: MEDICARE

## 2024-02-12 VITALS
DIASTOLIC BLOOD PRESSURE: 84 MMHG | HEART RATE: 51 BPM | BODY MASS INDEX: 34.42 KG/M2 | WEIGHT: 232.4 LBS | HEIGHT: 69 IN | SYSTOLIC BLOOD PRESSURE: 123 MMHG | OXYGEN SATURATION: 99 %

## 2024-02-12 DIAGNOSIS — I25.118 CORONARY ARTERY DISEASE OF NATIVE ARTERY OF NATIVE HEART WITH STABLE ANGINA PECTORIS: Primary | ICD-10-CM

## 2024-02-12 DIAGNOSIS — I25.118 CORONARY ARTERY DISEASE INVOLVING NATIVE CORONARY ARTERY OF NATIVE HEART WITH OTHER FORM OF ANGINA PECTORIS: Primary | ICD-10-CM

## 2024-02-12 DIAGNOSIS — R79.89 OTHER SPECIFIED ABNORMAL FINDINGS OF BLOOD CHEMISTRY: ICD-10-CM

## 2024-02-12 DIAGNOSIS — I25.118 CORONARY ARTERY DISEASE INVOLVING NATIVE CORONARY ARTERY OF NATIVE HEART WITH OTHER FORM OF ANGINA PECTORIS: ICD-10-CM

## 2024-02-12 DIAGNOSIS — I79.8 OTHER DISORDERS OF ARTERIES, ARTERIOLES AND CAPILLARIES IN DISEASES CLASSIFIED ELSEWHERE: ICD-10-CM

## 2024-02-12 DIAGNOSIS — R06.02 SOB (SHORTNESS OF BREATH): ICD-10-CM

## 2024-02-12 PROBLEM — I25.10 CORONARY ARTERY DISEASE: Status: ACTIVE | Noted: 2024-02-12

## 2024-02-12 LAB
ALBUMIN SERPL-MCNC: 4.2 G/DL (ref 3.5–5.2)
ALBUMIN/GLOB SERPL: 1.4 G/DL
ALP SERPL-CCNC: 115 U/L (ref 39–117)
ALT SERPL W P-5'-P-CCNC: 20 U/L (ref 1–41)
ANION GAP SERPL CALCULATED.3IONS-SCNC: 11 MMOL/L (ref 5–15)
AST SERPL-CCNC: 19 U/L (ref 1–40)
BASOPHILS # BLD AUTO: 0.07 10*3/MM3 (ref 0–0.2)
BASOPHILS NFR BLD AUTO: 0.9 % (ref 0–1.5)
BILIRUB SERPL-MCNC: 0.6 MG/DL (ref 0–1.2)
BUN SERPL-MCNC: 15 MG/DL (ref 8–23)
BUN/CREAT SERPL: 14.6 (ref 7–25)
CALCIUM SPEC-SCNC: 9.3 MG/DL (ref 8.6–10.5)
CHLORIDE SERPL-SCNC: 107 MMOL/L (ref 98–107)
CO2 SERPL-SCNC: 25 MMOL/L (ref 22–29)
CREAT SERPL-MCNC: 1.03 MG/DL (ref 0.76–1.27)
DEPRECATED RDW RBC AUTO: 45.3 FL (ref 37–54)
EGFRCR SERPLBLD CKD-EPI 2021: 79.1 ML/MIN/1.73
EOSINOPHIL # BLD AUTO: 0.22 10*3/MM3 (ref 0–0.4)
EOSINOPHIL NFR BLD AUTO: 2.8 % (ref 0.3–6.2)
ERYTHROCYTE [DISTWIDTH] IN BLOOD BY AUTOMATED COUNT: 12.7 % (ref 12.3–15.4)
GLOBULIN UR ELPH-MCNC: 3 GM/DL
GLUCOSE SERPL-MCNC: 86 MG/DL (ref 65–99)
HBA1C MFR BLD: 5.6 % (ref 4.8–5.6)
HCT VFR BLD AUTO: 47.4 % (ref 37.5–51)
HGB BLD-MCNC: 15.2 G/DL (ref 13–17.7)
IMM GRANULOCYTES # BLD AUTO: 0.02 10*3/MM3 (ref 0–0.05)
IMM GRANULOCYTES NFR BLD AUTO: 0.3 % (ref 0–0.5)
LYMPHOCYTES # BLD AUTO: 1.84 10*3/MM3 (ref 0.7–3.1)
LYMPHOCYTES NFR BLD AUTO: 23.8 % (ref 19.6–45.3)
MCH RBC QN AUTO: 31.2 PG (ref 26.6–33)
MCHC RBC AUTO-ENTMCNC: 32.1 G/DL (ref 31.5–35.7)
MCV RBC AUTO: 97.3 FL (ref 79–97)
MONOCYTES # BLD AUTO: 0.74 10*3/MM3 (ref 0.1–0.9)
MONOCYTES NFR BLD AUTO: 9.6 % (ref 5–12)
NEUTROPHILS NFR BLD AUTO: 4.85 10*3/MM3 (ref 1.7–7)
NEUTROPHILS NFR BLD AUTO: 62.6 % (ref 42.7–76)
NRBC BLD AUTO-RTO: 0 /100 WBC (ref 0–0.2)
PLATELET # BLD AUTO: 170 10*3/MM3 (ref 140–450)
PMV BLD AUTO: 10.9 FL (ref 6–12)
POTASSIUM SERPL-SCNC: 4.5 MMOL/L (ref 3.5–5.2)
PROT SERPL-MCNC: 7.2 G/DL (ref 6–8.5)
RBC # BLD AUTO: 4.87 10*6/MM3 (ref 4.14–5.8)
SODIUM SERPL-SCNC: 143 MMOL/L (ref 136–145)
WBC NRBC COR # BLD AUTO: 7.74 10*3/MM3 (ref 3.4–10.8)

## 2024-02-12 PROCEDURE — 83036 HEMOGLOBIN GLYCOSYLATED A1C: CPT

## 2024-02-12 PROCEDURE — 80053 COMPREHEN METABOLIC PANEL: CPT

## 2024-02-12 PROCEDURE — 85025 COMPLETE CBC W/AUTO DIFF WBC: CPT

## 2024-02-12 PROCEDURE — 36415 COLL VENOUS BLD VENIPUNCTURE: CPT

## 2024-02-12 PROCEDURE — 99204 OFFICE O/P NEW MOD 45 MIN: CPT | Performed by: SURGERY

## 2024-02-12 RX ORDER — ACETAMINOPHEN 500 MG
1000 TABLET ORAL ONCE
OUTPATIENT
Start: 2024-02-20

## 2024-02-12 RX ORDER — SODIUM CHLORIDE 9 MG/ML
40 INJECTION, SOLUTION INTRAVENOUS AS NEEDED
OUTPATIENT
Start: 2024-02-12

## 2024-02-12 RX ORDER — AMLODIPINE BESYLATE 5 MG/1
5 TABLET ORAL DAILY
COMMUNITY
Start: 2024-01-11 | End: 2024-02-25 | Stop reason: HOSPADM

## 2024-02-12 RX ORDER — SODIUM CHLORIDE 0.9 % (FLUSH) 0.9 %
30 SYRINGE (ML) INJECTION ONCE AS NEEDED
OUTPATIENT
Start: 2024-02-12

## 2024-02-12 RX ORDER — SODIUM CHLORIDE 0.9 % (FLUSH) 0.9 %
10 SYRINGE (ML) INJECTION AS NEEDED
OUTPATIENT
Start: 2024-02-12

## 2024-02-12 RX ORDER — NICOTINE POLACRILEX 4 MG
15 LOZENGE BUCCAL
OUTPATIENT
Start: 2024-02-12

## 2024-02-12 RX ORDER — DEXTROSE MONOHYDRATE 25 G/50ML
10-50 INJECTION, SOLUTION INTRAVENOUS
OUTPATIENT
Start: 2024-02-12

## 2024-02-12 RX ORDER — IBUPROFEN 600 MG/1
1 TABLET ORAL
OUTPATIENT
Start: 2024-02-12

## 2024-02-12 RX ORDER — SODIUM CHLORIDE 0.9 % (FLUSH) 0.9 %
10 SYRINGE (ML) INJECTION EVERY 12 HOURS SCHEDULED
OUTPATIENT
Start: 2024-02-12

## 2024-02-12 RX ORDER — SODIUM CHLORIDE 9 MG/ML
30 INJECTION, SOLUTION INTRAVENOUS CONTINUOUS PRN
OUTPATIENT
Start: 2024-02-12

## 2024-02-12 NOTE — LETTER
February 19, 2024       No Recipients    Patient: Atilio Skelton   YOB: 1955   Date of Visit: 2/12/2024       Dear Zaheer Interiano,    Atilio Skelton was in my office today. Below are the relevant portions of my assessment and plan of care.    Mr. Skelton is a 68-year-old male who presents with stable angina and severe multivessel coronary disease, including left main disease. LV function appears normal on LV gram.  He is otherwise healthy for his age. He is obese with a BMI of 34.3, does not have diabetes, and does not smoke. He is fairly active.     Today we discussed the natural history of coronary disease such as theirs.  We discussed treatment options including medical management, PCI, and coronary artery bypass grafting.  We agreed that coronary bypass grafting is the best treatment option.  We discussed alternative of high risk PCI, which I would not recommend given the left main and proximal LAD disease. He understands and agrees with this.  I discussed with them preoperative work-up, operative expectations and postoperative expectations, they understand and agree to proceed.    We discussed the risk of surgery including but not limited to bleeding, infection, injury to major organs or vessels, chronic heart failure, arrhythmias, need for pacemaker, prolonged ventilation, renal failure, stroke, risk of anesthesia, risk of sternal wound or bone healing problems, reoperation, and/or death.  I calculated his patient specific STS risk score for isolated CABG, I discussed this with him they understand and agree to proceed with surgery.      We will complete his workup and plan on scheduling surgery for 02/20/2024. Thank you for trusting me with the care of Mr. Skelton.  Please do not hesitate to call with any questions or concerns.         Sincerely,        Jose Luis Christine MD        CC:   No Recipients

## 2024-02-13 NOTE — H&P (VIEW-ONLY)
Cardiac Surgery Consultation    Referring Physician: Dr. Ronnie Tuttle    Primary Care Physician: Dr. Zaheer Interiano    Chief Complaint   Patient presents with    Coronary Artery Disease     New patient from Dr Tuttle         Subjective     The patient is a 68-year-old male who presents for evaluation of stable angina and severe multivessel coronary disease. He is accompanied by his wife, Margaux.    Atilio was referred to Dr. Tuttle by his primary care provider after irregular findings with auscultation of the heart. He denies peripheral edema, peripheral vascular disease, cerebrovascular events, or history of surgical intervention on the chest, lungs, or heart. He was recently prescribed a medication and has noticed increased dyspnea and fatigue since starting that. Of note, he has recently lost more than 30 pounds with intentional changes in diet and exercise.     He receives eye injections every 7 weeks for management of macular degeneration. The next scheduled appointment is 02/13/2024.    Atilio is retired. 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 He is a non-smoker. They are scheduled to travel to [place] in 04/2024.      Review of Systems     A complete review of systems was performed, is negative except stated above.    Past Medical History:   Diagnosis Date    Arrhythmia     Asthma     Hyperlipidemia      Past Surgical History:   Procedure Laterality Date    CARDIAC CATHETERIZATION Left 2/5/2024    Procedure: Cardiac Catheterization/Vascular Study;  Surgeon: Ronnie Tuttle MD;  Location: [facility] CATH INVASIVE LOCATION;  Service: Cardiology;  Laterality: Left;    GALLBLADDER SURGERY      MOUTH SURGERY  01/31/2024    had 5 teeth removed     History reviewed. No pertinent family history.  Social History     Tobacco Use    Smoking status: Never    Smokeless tobacco: Never   Substance Use Topics    Alcohol use: Never     "Drug use: Never     Current Outpatient Medications   Medication Sig Dispense Refill    amLODIPine (NORVASC) 5 MG tablet Take 1 tablet by mouth.      aspirin 81 MG chewable tablet Chew 1 tablet Daily.      dorzolamide-timolol (COSOPT) 22.3-6.8 MG/ML ophthalmic solution dorzolamide 22.3 mg-timolol 6.8 mg/mL eye drops      latanoprost (XALATAN) 0.005 % ophthalmic solution latanoprost 0.005 % eye drops   INSTILL 1 DROP INTO BOTH EYES AT BEDTIME      losartan (COZAAR) 25 MG tablet Take 1 tablet by mouth Daily.      metoprolol tartrate (LOPRESSOR) 25 MG tablet Take 1 tablet by mouth 2 (Two) Times a Day. 60 tablet 11    multivitamin with minerals tablet tablet Take 1 tablet by mouth Daily.      Netarsudil Dimesylate (Rhopressa) 0.02 % solution Rhopressa 0.02 % eye drops      rosuvastatin (CRESTOR) 10 MG tablet rosuvastatin 10 mg tablet      vitamin C (ASCORBIC ACID) 250 MG tablet Take 1 tablet by mouth Daily.      [START ON 2/19/2024] mupirocin (BACTROBAN) 2 % nasal ointment 1 Application into the nostril(s) as directed by provider 1 (One) Time for 1 dose. Apply pea-sized amount to a Q-tip and swab each nare x 1 dose on 2/19/2024 at 1700 1 each 0     No current facility-administered medications for this visit.     Allergies:  Sulfa antibiotics and Zithromax [azithromycin]    Objective      Vital Signs  Visit Vitals  /84 (BP Location: Right arm, Patient Position: Sitting, Cuff Size: Adult)   Pulse 51   Ht 175.3 cm (69\")   Wt 105 kg (232 lb 6.4 oz)   SpO2 99%   BMI 34.32 kg/m²         Physical Exam  Constitutional:       General: He is not in acute distress.     Appearance: He is well-developed. He is not diaphoretic.   HENT:      Head: Normocephalic and atraumatic.      Right Ear: External ear normal.      Left Ear: External ear normal.   Eyes:      General:         Right eye: No discharge.         Left eye: No discharge.      Pupils: Pupils are equal, round, and reactive to light.   Neck:      Vascular: No JVD.      " Trachea: No tracheal deviation.   Cardiovascular:      Rate and Rhythm: Normal rate and regular rhythm.      Heart sounds: Normal heart sounds. No murmur heard.  Pulmonary:      Effort: Pulmonary effort is normal. No respiratory distress.      Breath sounds: Normal breath sounds. No stridor. No wheezing.   Abdominal:      General: There is no distension.      Palpations: Abdomen is soft.      Tenderness: There is no abdominal tenderness. There is no guarding.   Musculoskeletal:         General: No tenderness or deformity. Normal range of motion.      Cervical back: Normal range of motion and neck supple.   Skin:     General: Skin is warm and dry.      Capillary Refill: Capillary refill takes less than 2 seconds.      Coloration: Skin is not pale.      Findings: No erythema or rash.   Neurological:      Mental Status: He is alert and oriented to person, place, and time.      Motor: No abnormal muscle tone.      Coordination: Coordination normal.   Psychiatric:         Behavior: Behavior normal.         Thought Content: Thought content normal.         Judgment: Judgment normal.         Results Review:     WBC   Date Value Ref Range Status   02/12/2024 7.74 3.40 - 10.80 10*3/mm3 Final     RBC   Date Value Ref Range Status   02/12/2024 4.87 4.14 - 5.80 10*6/mm3 Final     Hemoglobin   Date Value Ref Range Status   02/12/2024 15.2 13.0 - 17.7 g/dL Final     Hematocrit   Date Value Ref Range Status   02/12/2024 47.4 37.5 - 51.0 % Final     MCV   Date Value Ref Range Status   02/12/2024 97.3 (H) 79.0 - 97.0 fL Final     MCH   Date Value Ref Range Status   02/12/2024 31.2 26.6 - 33.0 pg Final     MCHC   Date Value Ref Range Status   02/12/2024 32.1 31.5 - 35.7 g/dL Final     RDW   Date Value Ref Range Status   02/12/2024 12.7 12.3 - 15.4 % Final     RDW-SD   Date Value Ref Range Status   02/12/2024 45.3 37.0 - 54.0 fl Final     MPV   Date Value Ref Range Status   02/12/2024 10.9 6.0 - 12.0 fL Final     Platelets   Date Value  Ref Range Status   02/12/2024 170 140 - 450 10*3/mm3 Final     Neutrophil %   Date Value Ref Range Status   02/12/2024 62.6 42.7 - 76.0 % Final     Lymphocyte %   Date Value Ref Range Status   02/12/2024 23.8 19.6 - 45.3 % Final     Monocyte %   Date Value Ref Range Status   02/12/2024 9.6 5.0 - 12.0 % Final     Eosinophil %   Date Value Ref Range Status   02/12/2024 2.8 0.3 - 6.2 % Final     Basophil %   Date Value Ref Range Status   02/12/2024 0.9 0.0 - 1.5 % Final     Immature Grans %   Date Value Ref Range Status   02/12/2024 0.3 0.0 - 0.5 % Final     Neutrophils, Absolute   Date Value Ref Range Status   02/12/2024 4.85 1.70 - 7.00 10*3/mm3 Final     Lymphocytes, Absolute   Date Value Ref Range Status   02/12/2024 1.84 0.70 - 3.10 10*3/mm3 Final     Monocytes, Absolute   Date Value Ref Range Status   02/12/2024 0.74 0.10 - 0.90 10*3/mm3 Final     Eosinophils, Absolute   Date Value Ref Range Status   02/12/2024 0.22 0.00 - 0.40 10*3/mm3 Final     Basophils, Absolute   Date Value Ref Range Status   02/12/2024 0.07 0.00 - 0.20 10*3/mm3 Final     Immature Grans, Absolute   Date Value Ref Range Status   02/12/2024 0.02 0.00 - 0.05 10*3/mm3 Final     nRBC   Date Value Ref Range Status   02/12/2024 0.0 0.0 - 0.2 /100 WBC Final     Glucose   Date Value Ref Range Status   02/12/2024 86 65 - 99 mg/dL Final     Sodium   Date Value Ref Range Status   02/12/2024 143 136 - 145 mmol/L Final     Potassium   Date Value Ref Range Status   02/12/2024 4.5 3.5 - 5.2 mmol/L Final     Comment:     Slight hemolysis detected by analyzer. Result may be falsely elevated.     CO2   Date Value Ref Range Status   02/12/2024 25.0 22.0 - 29.0 mmol/L Final     Chloride   Date Value Ref Range Status   02/12/2024 107 98 - 107 mmol/L Final     Anion Gap   Date Value Ref Range Status   02/12/2024 11.0 5.0 - 15.0 mmol/L Final     Creatinine   Date Value Ref Range Status   02/12/2024 1.03 0.76 - 1.27 mg/dL Final     BUN   Date Value Ref Range Status    02/12/2024 15 8 - 23 mg/dL Final     BUN/Creatinine Ratio   Date Value Ref Range Status   02/12/2024 14.6 7.0 - 25.0 Final     Calcium   Date Value Ref Range Status   02/12/2024 9.3 8.6 - 10.5 mg/dL Final     Alkaline Phosphatase   Date Value Ref Range Status   02/12/2024 115 39 - 117 U/L Final     Total Protein   Date Value Ref Range Status   02/12/2024 7.2 6.0 - 8.5 g/dL Final     ALT (SGPT)   Date Value Ref Range Status   02/12/2024 20 1 - 41 U/L Final     AST (SGOT)   Date Value Ref Range Status   02/12/2024 19 1 - 40 U/L Final     Total Bilirubin   Date Value Ref Range Status   02/12/2024 0.6 0.0 - 1.2 mg/dL Final     Albumin   Date Value Ref Range Status   02/12/2024 4.2 3.5 - 5.2 g/dL Final     Globulin   Date Value Ref Range Status   02/12/2024 3.0 gm/dL Final        I reviewed the patient's clinical results and discussed with patient.    Cardiac Catheterization Operative Report        Patient was referred for cardiac catheterization: High suspicion for coronary artery disease     Procedure performed  Access of right radial artery using ultrasonic guidance with confirmation of placement of catheter in the right radial artery CPT code 20953  Left heart catheterization using 5 [place] angled pigtail catheter  Left ventriculography using 5 [place] angled pigtail catheter  Selective coronary angiography using TIG 5 [place] diagnostic catheter  Moderate sedation administered under supervision of  with monitoring CPT code 17839  Aortic arch angiography using 5 [place] angled pigtail catheter        Procedure Details  The risks, benefits, complications, treatment options, and expected outcomes were discussed with the patient. Plan is for moderate sedation, and the patient agrees to proceed with the procedure.  An immediate assessment was done prior to the administration of moderate sedation.     The patient and/or family concurred with the proposed plan, giving informed consent. Patient was brought to the  cath lab after IV hydration was begun and oral premedication was given.      The skin overlying the patient's right radial artery was prepped and draped in the usual sterile fashion.  Timeout was taken to confirm the correct patient and procedure.  Lidocaine was administered for local anesthesia.  IV Versed and fentanyl were used to achieve conscious sedation.  Modified Seldinger technique was then used to place a 6 [place] sheath in the right radial artery     Diagnostic coronary angiography was performed with Tig coronary catheter.     Coronary angiogram were performed in [place] and [place] projection to evaluate the coronary arterial systems.       A TR band device was used to achieve hemostasis at the end of the procedure.  The patient tolerated the procedure well, and there were no immediate complications.     Procedural Details: After written and informed consent was obtained, the patient was brought to the cath lab in a fasting state.        Selective coronary angiography:  Left main coronary artery:  The left main coronary artery arises from the left coronary cusp and bifurcates into the  left anterior descending coronary artery  and left circumflex arteries.    Left anterior descending artery:  The  left anterior descending coronary artery   arises normally from the left main coronary artery and courses in the anterior interventricular groove and terminates at the apex. No stenosis noted  Left circumflex:  The left circumflex arises form the left man artery and supplies obtuse marginal branches.  .  Right coronary artery:  The right coronary artery  arises normally from the right coronary cusp and is dominant for the posterior circulation.     Interventions: None     Estimated Blood Loss:  Minimal     Specimens: None         Complications:  None; patient tolerated the procedure well.           Disposition: Cardiovascular observation unit           Condition: stable            I supervised the administration of  conscious sedation by nursing staff throughout the case.  First dose was given at  1245    and the end of my face-to-face encounter was at  1304   Hours.     During the case, continuous pulse oximetry, heart rate, blood pressure, and patient status were monitored.         Risk, Benefits, and Alternatives discussed with the patient and/or family.  Plan is for moderate sedation, and the patient agrees to proceed with the procedure.  An immediate assessment was done prior to the administration of moderate sedation        Conclusion 2/5/2024  Distal left main coronary artery is calcified with 50% stenosis  Ostial left anterior descending coronary artery is moderate calcification with 70% stenosis  Proximal left circumflex coronary artery has 60-70 % stenosis best visualized in [place] caudal view  Subbranch of first obtuse marginal branch has 90% stenosis  No significant disease of second obtuse marginal branch  Right coronary artery is dominant  Moderate calcification noted  Distal right coronary artery has 60 to 70% eccentric stenosis  Bilateral subclavian arteries and internal mammary arteries are patent  Aortic arch angiography does not show any significant dilatation or dissection  Left ventricular end-diastolic pressure normal at 9 mmHg with no gradient across aortic valve on pullback  Left ventriculography shows ejection fraction of approximately 50% with inferior apical hypokinesis  No significant mitral regurgitation     Summary  Multivessel coronary artery disease as described above        Plan  Optimal guideline directed medical therapy  Consider referral for aortocoronary bypass surgery  Hydration   Observation  Risk factor modifications      I personally reviewed coronary angiography and the following is my interpretation:   CATH: There is severe calcification stenosis of the distal left main and proximal LAD. There is severe disease in the distal RCA that is dominant for the posterior circulation. The PDA  appears graftable. There is a small, mid LAD diagonal artery that likely has some disease, although I am not sure that it is big enough for grafting. The largest OM bifurcates and the more distal bifurcation has a proximal stenosis. The distal vessel is likely graftable.         Assessment & Plan   Mr. Skelton is a 68-year-old male who presents with stable angina and severe multivessel coronary disease, including left main disease. LV function appears normal on LV gram.  He is otherwise healthy for his age. He is obese with a BMI of 34.3, does not have diabetes, and does not smoke. He is fairly active.     Today we discussed the natural history of coronary disease such as theirs.  We discussed treatment options including medical management, PCI, and coronary artery bypass grafting.  We agreed that coronary bypass grafting is the best treatment option.  We discussed alternative of high risk PCI, which I would not recommend given the left main and proximal LAD disease. He understands and agrees with this.  I discussed with them preoperative work-up, operative expectations and postoperative expectations, they understand and agree to proceed.    We discussed the risk of surgery including but not limited to bleeding, infection, injury to major organs or vessels, chronic heart failure, arrhythmias, need for pacemaker, prolonged ventilation, renal failure, stroke, risk of anesthesia, risk of sternal wound or bone healing problems, reoperation, and/or death.  I calculated his patient specific STS risk score for isolated CABG, I discussed this with him they understand and agree to proceed with surgery.      We will complete his workup and plan on scheduling surgery for 02/20/2024. Thank you for trusting me with the care of Mr. Skelton.  Please do not hesitate to call with any questions or concerns.        Jose Luis Christine MD   Cardiothoracic Surgeon

## 2024-02-13 NOTE — PROGRESS NOTES
Cardiac Surgery Consultation    Referring Physician: Dr. Ronnie Tuttle    Primary Care Physician: Dr. Zaheer Interiano    Chief Complaint   Patient presents with    Coronary Artery Disease     New patient from Dr Tuttle         Subjective     The patient is a 68-year-old male who presents for evaluation of stable angina and severe multivessel coronary disease. He is accompanied by his wife, Margaux.    Atilio was referred to Dr. Tuttle by his primary care provider after irregular findings with auscultation of the heart. He denies peripheral edema, peripheral vascular disease, cerebrovascular events, or history of surgical intervention on the chest, lungs, or heart. He was recently prescribed a medication and has noticed increased dyspnea and fatigue since starting that. Of note, he has recently lost more than 30 pounds with intentional changes in diet and exercise.     He receives eye injections every 7 weeks for management of macular degeneration. The next scheduled appointment is 02/13/2024.    Atilio is retired. 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 He is a non-smoker. They are scheduled to travel to [place] in 04/2024.      Review of Systems     A complete review of systems was performed, is negative except stated above.    Past Medical History:   Diagnosis Date    Arrhythmia     Asthma     Hyperlipidemia      Past Surgical History:   Procedure Laterality Date    CARDIAC CATHETERIZATION Left 2/5/2024    Procedure: Cardiac Catheterization/Vascular Study;  Surgeon: Ronnie Tuttle MD;  Location: [facility] CATH INVASIVE LOCATION;  Service: Cardiology;  Laterality: Left;    GALLBLADDER SURGERY      MOUTH SURGERY  01/31/2024    had 5 teeth removed     History reviewed. No pertinent family history.  Social History     Tobacco Use    Smoking status: Never    Smokeless tobacco: Never   Substance Use Topics    Alcohol use: Never     "Drug use: Never     Current Outpatient Medications   Medication Sig Dispense Refill    amLODIPine (NORVASC) 5 MG tablet Take 1 tablet by mouth.      aspirin 81 MG chewable tablet Chew 1 tablet Daily.      dorzolamide-timolol (COSOPT) 22.3-6.8 MG/ML ophthalmic solution dorzolamide 22.3 mg-timolol 6.8 mg/mL eye drops      latanoprost (XALATAN) 0.005 % ophthalmic solution latanoprost 0.005 % eye drops   INSTILL 1 DROP INTO BOTH EYES AT BEDTIME      losartan (COZAAR) 25 MG tablet Take 1 tablet by mouth Daily.      metoprolol tartrate (LOPRESSOR) 25 MG tablet Take 1 tablet by mouth 2 (Two) Times a Day. 60 tablet 11    multivitamin with minerals tablet tablet Take 1 tablet by mouth Daily.      Netarsudil Dimesylate (Rhopressa) 0.02 % solution Rhopressa 0.02 % eye drops      rosuvastatin (CRESTOR) 10 MG tablet rosuvastatin 10 mg tablet      vitamin C (ASCORBIC ACID) 250 MG tablet Take 1 tablet by mouth Daily.      [START ON 2/19/2024] mupirocin (BACTROBAN) 2 % nasal ointment 1 Application into the nostril(s) as directed by provider 1 (One) Time for 1 dose. Apply pea-sized amount to a Q-tip and swab each nare x 1 dose on 2/19/2024 at 1700 1 each 0     No current facility-administered medications for this visit.     Allergies:  Sulfa antibiotics and Zithromax [azithromycin]    Objective      Vital Signs  Visit Vitals  /84 (BP Location: Right arm, Patient Position: Sitting, Cuff Size: Adult)   Pulse 51   Ht 175.3 cm (69\")   Wt 105 kg (232 lb 6.4 oz)   SpO2 99%   BMI 34.32 kg/m²         Physical Exam  Constitutional:       General: He is not in acute distress.     Appearance: He is well-developed. He is not diaphoretic.   HENT:      Head: Normocephalic and atraumatic.      Right Ear: External ear normal.      Left Ear: External ear normal.   Eyes:      General:         Right eye: No discharge.         Left eye: No discharge.      Pupils: Pupils are equal, round, and reactive to light.   Neck:      Vascular: No JVD.      " Trachea: No tracheal deviation.   Cardiovascular:      Rate and Rhythm: Normal rate and regular rhythm.      Heart sounds: Normal heart sounds. No murmur heard.  Pulmonary:      Effort: Pulmonary effort is normal. No respiratory distress.      Breath sounds: Normal breath sounds. No stridor. No wheezing.   Abdominal:      General: There is no distension.      Palpations: Abdomen is soft.      Tenderness: There is no abdominal tenderness. There is no guarding.   Musculoskeletal:         General: No tenderness or deformity. Normal range of motion.      Cervical back: Normal range of motion and neck supple.   Skin:     General: Skin is warm and dry.      Capillary Refill: Capillary refill takes less than 2 seconds.      Coloration: Skin is not pale.      Findings: No erythema or rash.   Neurological:      Mental Status: He is alert and oriented to person, place, and time.      Motor: No abnormal muscle tone.      Coordination: Coordination normal.   Psychiatric:         Behavior: Behavior normal.         Thought Content: Thought content normal.         Judgment: Judgment normal.         Results Review:     WBC   Date Value Ref Range Status   02/12/2024 7.74 3.40 - 10.80 10*3/mm3 Final     RBC   Date Value Ref Range Status   02/12/2024 4.87 4.14 - 5.80 10*6/mm3 Final     Hemoglobin   Date Value Ref Range Status   02/12/2024 15.2 13.0 - 17.7 g/dL Final     Hematocrit   Date Value Ref Range Status   02/12/2024 47.4 37.5 - 51.0 % Final     MCV   Date Value Ref Range Status   02/12/2024 97.3 (H) 79.0 - 97.0 fL Final     MCH   Date Value Ref Range Status   02/12/2024 31.2 26.6 - 33.0 pg Final     MCHC   Date Value Ref Range Status   02/12/2024 32.1 31.5 - 35.7 g/dL Final     RDW   Date Value Ref Range Status   02/12/2024 12.7 12.3 - 15.4 % Final     RDW-SD   Date Value Ref Range Status   02/12/2024 45.3 37.0 - 54.0 fl Final     MPV   Date Value Ref Range Status   02/12/2024 10.9 6.0 - 12.0 fL Final     Platelets   Date Value  Ref Range Status   02/12/2024 170 140 - 450 10*3/mm3 Final     Neutrophil %   Date Value Ref Range Status   02/12/2024 62.6 42.7 - 76.0 % Final     Lymphocyte %   Date Value Ref Range Status   02/12/2024 23.8 19.6 - 45.3 % Final     Monocyte %   Date Value Ref Range Status   02/12/2024 9.6 5.0 - 12.0 % Final     Eosinophil %   Date Value Ref Range Status   02/12/2024 2.8 0.3 - 6.2 % Final     Basophil %   Date Value Ref Range Status   02/12/2024 0.9 0.0 - 1.5 % Final     Immature Grans %   Date Value Ref Range Status   02/12/2024 0.3 0.0 - 0.5 % Final     Neutrophils, Absolute   Date Value Ref Range Status   02/12/2024 4.85 1.70 - 7.00 10*3/mm3 Final     Lymphocytes, Absolute   Date Value Ref Range Status   02/12/2024 1.84 0.70 - 3.10 10*3/mm3 Final     Monocytes, Absolute   Date Value Ref Range Status   02/12/2024 0.74 0.10 - 0.90 10*3/mm3 Final     Eosinophils, Absolute   Date Value Ref Range Status   02/12/2024 0.22 0.00 - 0.40 10*3/mm3 Final     Basophils, Absolute   Date Value Ref Range Status   02/12/2024 0.07 0.00 - 0.20 10*3/mm3 Final     Immature Grans, Absolute   Date Value Ref Range Status   02/12/2024 0.02 0.00 - 0.05 10*3/mm3 Final     nRBC   Date Value Ref Range Status   02/12/2024 0.0 0.0 - 0.2 /100 WBC Final     Glucose   Date Value Ref Range Status   02/12/2024 86 65 - 99 mg/dL Final     Sodium   Date Value Ref Range Status   02/12/2024 143 136 - 145 mmol/L Final     Potassium   Date Value Ref Range Status   02/12/2024 4.5 3.5 - 5.2 mmol/L Final     Comment:     Slight hemolysis detected by analyzer. Result may be falsely elevated.     CO2   Date Value Ref Range Status   02/12/2024 25.0 22.0 - 29.0 mmol/L Final     Chloride   Date Value Ref Range Status   02/12/2024 107 98 - 107 mmol/L Final     Anion Gap   Date Value Ref Range Status   02/12/2024 11.0 5.0 - 15.0 mmol/L Final     Creatinine   Date Value Ref Range Status   02/12/2024 1.03 0.76 - 1.27 mg/dL Final     BUN   Date Value Ref Range Status    02/12/2024 15 8 - 23 mg/dL Final     BUN/Creatinine Ratio   Date Value Ref Range Status   02/12/2024 14.6 7.0 - 25.0 Final     Calcium   Date Value Ref Range Status   02/12/2024 9.3 8.6 - 10.5 mg/dL Final     Alkaline Phosphatase   Date Value Ref Range Status   02/12/2024 115 39 - 117 U/L Final     Total Protein   Date Value Ref Range Status   02/12/2024 7.2 6.0 - 8.5 g/dL Final     ALT (SGPT)   Date Value Ref Range Status   02/12/2024 20 1 - 41 U/L Final     AST (SGOT)   Date Value Ref Range Status   02/12/2024 19 1 - 40 U/L Final     Total Bilirubin   Date Value Ref Range Status   02/12/2024 0.6 0.0 - 1.2 mg/dL Final     Albumin   Date Value Ref Range Status   02/12/2024 4.2 3.5 - 5.2 g/dL Final     Globulin   Date Value Ref Range Status   02/12/2024 3.0 gm/dL Final        I reviewed the patient's clinical results and discussed with patient.    Cardiac Catheterization Operative Report        Patient was referred for cardiac catheterization: High suspicion for coronary artery disease     Procedure performed  Access of right radial artery using ultrasonic guidance with confirmation of placement of catheter in the right radial artery CPT code 16060  Left heart catheterization using 5 Sammarinese angled pigtail catheter  Left ventriculography using 5 Sammarinese angled pigtail catheter  Selective coronary angiography using TIG 5 Sammarinese diagnostic catheter  Moderate sedation administered under supervision of  with monitoring CPT code 92708  Aortic arch angiography using 5 Sammarinese angled pigtail catheter        Procedure Details  The risks, benefits, complications, treatment options, and expected outcomes were discussed with the patient. Plan is for moderate sedation, and the patient agrees to proceed with the procedure.  An immediate assessment was done prior to the administration of moderate sedation.     The patient and/or family concurred with the proposed plan, giving informed consent. Patient was brought to the  cath lab after IV hydration was begun and oral premedication was given.      The skin overlying the patient's right radial artery was prepped and draped in the usual sterile fashion.  Timeout was taken to confirm the correct patient and procedure.  Lidocaine was administered for local anesthesia.  IV Versed and fentanyl were used to achieve conscious sedation.  Modified Seldinger technique was then used to place a 6 Yemeni sheath in the right radial artery     Diagnostic coronary angiography was performed with Tig coronary catheter.     Coronary angiogram were performed in Latvian and LAUGHLIN projection to evaluate the coronary arterial systems.       A TR band device was used to achieve hemostasis at the end of the procedure.  The patient tolerated the procedure well, and there were no immediate complications.     Procedural Details: After written and informed consent was obtained, the patient was brought to the cath lab in a fasting state.        Selective coronary angiography:  Left main coronary artery:  The left main coronary artery arises from the left coronary cusp and bifurcates into the  left anterior descending coronary artery  and left circumflex arteries.    Left anterior descending artery:  The  left anterior descending coronary artery   arises normally from the left main coronary artery and courses in the anterior interventricular groove and terminates at the apex. No stenosis noted  Left circumflex:  The left circumflex arises form the left man artery and supplies obtuse marginal branches.  .  Right coronary artery:  The right coronary artery  arises normally from the right coronary cusp and is dominant for the posterior circulation.     Interventions: None     Estimated Blood Loss:  Minimal     Specimens: None         Complications:  None; patient tolerated the procedure well.           Disposition: Cardiovascular observation unit           Condition: stable            I supervised the administration of  conscious sedation by nursing staff throughout the case.  First dose was given at  1245    and the end of my face-to-face encounter was at  1304   Hours.     During the case, continuous pulse oximetry, heart rate, blood pressure, and patient status were monitored.         Risk, Benefits, and Alternatives discussed with the patient and/or family.  Plan is for moderate sedation, and the patient agrees to proceed with the procedure.  An immediate assessment was done prior to the administration of moderate sedation        Conclusion 2/5/2024  Distal left main coronary artery is calcified with 50% stenosis  Ostial left anterior descending coronary artery is moderate calcification with 70% stenosis  Proximal left circumflex coronary artery has 60-70 % stenosis best visualized in Palauan caudal view  Subbranch of first obtuse marginal branch has 90% stenosis  No significant disease of second obtuse marginal branch  Right coronary artery is dominant  Moderate calcification noted  Distal right coronary artery has 60 to 70% eccentric stenosis  Bilateral subclavian arteries and internal mammary arteries are patent  Aortic arch angiography does not show any significant dilatation or dissection  Left ventricular end-diastolic pressure normal at 9 mmHg with no gradient across aortic valve on pullback  Left ventriculography shows ejection fraction of approximately 50% with inferior apical hypokinesis  No significant mitral regurgitation     Summary  Multivessel coronary artery disease as described above        Plan  Optimal guideline directed medical therapy  Consider referral for aortocoronary bypass surgery  Hydration   Observation  Risk factor modifications      I personally reviewed coronary angiography and the following is my interpretation:   CATH: There is severe calcification stenosis of the distal left main and proximal LAD. There is severe disease in the distal RCA that is dominant for the posterior circulation. The PDA  appears graftable. There is a small, mid LAD diagonal artery that likely has some disease, although I am not sure that it is big enough for grafting. The largest OM bifurcates and the more distal bifurcation has a proximal stenosis. The distal vessel is likely graftable.         Assessment & Plan   Mr. Skelton is a 68-year-old male who presents with stable angina and severe multivessel coronary disease, including left main disease. LV function appears normal on LV gram.  He is otherwise healthy for his age. He is obese with a BMI of 34.3, does not have diabetes, and does not smoke. He is fairly active.     Today we discussed the natural history of coronary disease such as theirs.  We discussed treatment options including medical management, PCI, and coronary artery bypass grafting.  We agreed that coronary bypass grafting is the best treatment option.  We discussed alternative of high risk PCI, which I would not recommend given the left main and proximal LAD disease. He understands and agrees with this.  I discussed with them preoperative work-up, operative expectations and postoperative expectations, they understand and agree to proceed.    We discussed the risk of surgery including but not limited to bleeding, infection, injury to major organs or vessels, chronic heart failure, arrhythmias, need for pacemaker, prolonged ventilation, renal failure, stroke, risk of anesthesia, risk of sternal wound or bone healing problems, reoperation, and/or death.  I calculated his patient specific STS risk score for isolated CABG, I discussed this with him they understand and agree to proceed with surgery.      We will complete his workup and plan on scheduling surgery for 02/20/2024. Thank you for trusting me with the care of Mr. Skelton.  Please do not hesitate to call with any questions or concerns.        Jose Luis Christine MD   Cardiothoracic Surgeon

## 2024-02-14 ENCOUNTER — HOSPITAL ENCOUNTER (OUTPATIENT)
Dept: CARDIOLOGY | Facility: HOSPITAL | Age: 69
Discharge: HOME | End: 2024-02-14
Payer: MEDICARE

## 2024-02-14 ENCOUNTER — HOSPITAL ENCOUNTER (OUTPATIENT)
Dept: ULTRASOUND IMAGING | Facility: HOSPITAL | Age: 69
Discharge: HOME | End: 2024-02-14
Payer: MEDICARE

## 2024-02-14 ENCOUNTER — HOSPITAL ENCOUNTER (OUTPATIENT)
Dept: CT IMAGING | Facility: HOSPITAL | Age: 69
Discharge: HOME | End: 2024-02-14
Payer: MEDICARE

## 2024-02-14 VITALS
HEIGHT: 69 IN | SYSTOLIC BLOOD PRESSURE: 123 MMHG | WEIGHT: 232 LBS | BODY MASS INDEX: 34.36 KG/M2 | DIASTOLIC BLOOD PRESSURE: 84 MMHG

## 2024-02-14 DIAGNOSIS — I25.118 CORONARY ARTERY DISEASE INVOLVING NATIVE CORONARY ARTERY OF NATIVE HEART WITH OTHER FORM OF ANGINA PECTORIS: ICD-10-CM

## 2024-02-14 DIAGNOSIS — I79.8 OTHER DISORDERS OF ARTERIES, ARTERIOLES AND CAPILLARIES IN DISEASES CLASSIFIED ELSEWHERE: ICD-10-CM

## 2024-02-14 PROCEDURE — 93306 TTE W/DOPPLER COMPLETE: CPT

## 2024-02-14 PROCEDURE — 93356 MYOCRD STRAIN IMG SPCKL TRCK: CPT

## 2024-02-14 PROCEDURE — 71250 CT THORAX DX C-: CPT

## 2024-02-14 PROCEDURE — 93970 EXTREMITY STUDY: CPT

## 2024-02-14 PROCEDURE — 25510000001 PERFLUTREN 6.52 MG/ML SUSPENSION: Performed by: NURSE PRACTITIONER

## 2024-02-14 PROCEDURE — 93880 EXTRACRANIAL BILAT STUDY: CPT

## 2024-02-14 RX ADMIN — PERFLUTREN 8.48 MG: 6.52 INJECTION, SUSPENSION INTRAVENOUS at 09:12

## 2024-02-19 ENCOUNTER — HOSPITAL ENCOUNTER (OUTPATIENT)
Dept: GENERAL RADIOLOGY | Facility: HOSPITAL | Age: 69
Discharge: HOME | End: 2024-02-19
Payer: MEDICARE

## 2024-02-19 ENCOUNTER — HOSPITAL ENCOUNTER (OUTPATIENT)
Dept: PULMONOLOGY | Facility: HOSPITAL | Age: 69
Discharge: HOME | End: 2024-02-19
Payer: MEDICARE

## 2024-02-19 ENCOUNTER — PRE-ADMISSION TESTING (OUTPATIENT)
Dept: PREADMISSION TESTING | Facility: HOSPITAL | Age: 69
DRG: 236 | End: 2024-02-19
Payer: MEDICARE

## 2024-02-19 ENCOUNTER — ANESTHESIA EVENT (OUTPATIENT)
Dept: PERIOP | Facility: HOSPITAL | Age: 69
End: 2024-02-19
Payer: MEDICARE

## 2024-02-19 VITALS
OXYGEN SATURATION: 99 % | BODY MASS INDEX: 33.93 KG/M2 | DIASTOLIC BLOOD PRESSURE: 66 MMHG | WEIGHT: 236.99 LBS | HEIGHT: 70 IN | SYSTOLIC BLOOD PRESSURE: 133 MMHG | RESPIRATION RATE: 18 BRPM | HEART RATE: 68 BPM

## 2024-02-19 DIAGNOSIS — I25.118 CORONARY ARTERY DISEASE INVOLVING NATIVE CORONARY ARTERY OF NATIVE HEART WITH OTHER FORM OF ANGINA PECTORIS: ICD-10-CM

## 2024-02-19 DIAGNOSIS — R06.02 SOB (SHORTNESS OF BREATH): ICD-10-CM

## 2024-02-19 LAB
ABO GROUP BLD: NORMAL
ALBUMIN SERPL-MCNC: 4.2 G/DL (ref 3.5–5.2)
ALBUMIN/GLOB SERPL: 1.4 G/DL
ALP SERPL-CCNC: 102 U/L (ref 39–117)
ALT SERPL W P-5'-P-CCNC: 14 U/L (ref 1–41)
ANION GAP SERPL CALCULATED.3IONS-SCNC: 10 MMOL/L (ref 5–15)
APTT PPP: 29.5 SECONDS (ref 24.5–36)
ARTERIAL PATENCY WRIST A: POSITIVE
AST SERPL-CCNC: 15 U/L (ref 1–40)
ATMOSPHERIC PRESS: 755 MMHG
BASE EXCESS BLDA CALC-SCNC: -2.1 MMOL/L (ref 0–2)
BASOPHILS # BLD AUTO: 0.04 10*3/MM3 (ref 0–0.2)
BASOPHILS NFR BLD AUTO: 0.6 % (ref 0–1.5)
BDY SITE: ABNORMAL
BH CV ECHO LEFT VENTRICLE GLOBAL LONGITUDINAL STRAIN: -11.6 %
BH CV ECHO MEAS - AO MAX PG: 7.5 MMHG
BH CV ECHO MEAS - AO MEAN PG: 4 MMHG
BH CV ECHO MEAS - AO ROOT DIAM: 3.4 CM
BH CV ECHO MEAS - AO V2 MAX: 137 CM/SEC
BH CV ECHO MEAS - AO V2 VTI: 28.9 CM
BH CV ECHO MEAS - AVA(I,D): 2.8 CM2
BH CV ECHO MEAS - EDV(CUBED): 164.6 ML
BH CV ECHO MEAS - EDV(MOD-SP4): 154 ML
BH CV ECHO MEAS - EF(MOD-BP): 47 %
BH CV ECHO MEAS - EF(MOD-SP4): 47.1 %
BH CV ECHO MEAS - ESV(CUBED): 55.7 ML
BH CV ECHO MEAS - ESV(MOD-SP4): 81.4 ML
BH CV ECHO MEAS - FS: 30.3 %
BH CV ECHO MEAS - IVS/LVPW: 0.78 CM
BH CV ECHO MEAS - IVSD: 0.74 CM
BH CV ECHO MEAS - LA DIMENSION: 4.4 CM
BH CV ECHO MEAS - LAT PEAK E' VEL: 13.4 CM/SEC
BH CV ECHO MEAS - LV DIASTOLIC VOL/BSA (35-75): 70 CM2
BH CV ECHO MEAS - LV MASS(C)D: 169.2 GRAMS
BH CV ECHO MEAS - LV MAX PG: 4.7 MMHG
BH CV ECHO MEAS - LV MEAN PG: 2 MMHG
BH CV ECHO MEAS - LV SYSTOLIC VOL/BSA (12-30): 37 CM2
BH CV ECHO MEAS - LV V1 MAX: 108 CM/SEC
BH CV ECHO MEAS - LV V1 VTI: 23.5 CM
BH CV ECHO MEAS - LVIDD: 5.5 CM
BH CV ECHO MEAS - LVIDS: 3.8 CM
BH CV ECHO MEAS - LVOT AREA: 3.5 CM2
BH CV ECHO MEAS - LVOT DIAM: 2.1 CM
BH CV ECHO MEAS - LVPWD: 0.94 CM
BH CV ECHO MEAS - MED PEAK E' VEL: 8.2 CM/SEC
BH CV ECHO MEAS - MV A MAX VEL: 60.8 CM/SEC
BH CV ECHO MEAS - MV DEC TIME: 0.21 SEC
BH CV ECHO MEAS - MV E MAX VEL: 60 CM/SEC
BH CV ECHO MEAS - MV E/A: 0.99
BH CV ECHO MEAS - RAP SYSTOLE: 5 MMHG
BH CV ECHO MEAS - RVSP: 33.1 MMHG
BH CV ECHO MEAS - SI(MOD-SP4): 33 ML/M2
BH CV ECHO MEAS - SV(LVOT): 81.4 ML
BH CV ECHO MEAS - SV(MOD-SP4): 72.6 ML
BH CV ECHO MEAS - TAPSE (>1.6): 1.75 CM
BH CV ECHO MEAS - TR MAX PG: 28.1 MMHG
BH CV ECHO MEAS - TR MAX VEL: 265 CM/SEC
BH CV ECHO MEASUREMENTS AVERAGE E/E' RATIO: 5.56
BILIRUB SERPL-MCNC: 0.6 MG/DL (ref 0–1.2)
BLD GP AB SCN SERPL QL: NEGATIVE
BODY TEMPERATURE: 37
BUN SERPL-MCNC: 15 MG/DL (ref 8–23)
BUN/CREAT SERPL: 16 (ref 7–25)
CALCIUM SPEC-SCNC: 9.3 MG/DL (ref 8.6–10.5)
CHLORIDE SERPL-SCNC: 107 MMOL/L (ref 98–107)
CO2 SERPL-SCNC: 25 MMOL/L (ref 22–29)
CREAT SERPL-MCNC: 0.94 MG/DL (ref 0.76–1.27)
DEPRECATED RDW RBC AUTO: 45.2 FL (ref 37–54)
EGFRCR SERPLBLD CKD-EPI 2021: 88.3 ML/MIN/1.73
EOSINOPHIL # BLD AUTO: 0.19 10*3/MM3 (ref 0–0.4)
EOSINOPHIL NFR BLD AUTO: 2.9 % (ref 0.3–6.2)
ERYTHROCYTE [DISTWIDTH] IN BLOOD BY AUTOMATED COUNT: 12.9 % (ref 12.3–15.4)
GLOBULIN UR ELPH-MCNC: 2.9 GM/DL
GLUCOSE SERPL-MCNC: 112 MG/DL (ref 65–99)
HCO3 BLDA-SCNC: 22.2 MMOL/L (ref 20–26)
HCT VFR BLD AUTO: 47.1 % (ref 37.5–51)
HGB BLD-MCNC: 15.3 G/DL (ref 13–17.7)
IMM GRANULOCYTES # BLD AUTO: 0.02 10*3/MM3 (ref 0–0.05)
IMM GRANULOCYTES NFR BLD AUTO: 0.3 % (ref 0–0.5)
INR PPP: 0.97 (ref 0.91–1.09)
LEFT ATRIUM VOLUME INDEX: 25.7 ML/M2
LEFT ATRIUM VOLUME: 56.5 ML
LYMPHOCYTES # BLD AUTO: 1.12 10*3/MM3 (ref 0.7–3.1)
LYMPHOCYTES NFR BLD AUTO: 17.3 % (ref 19.6–45.3)
Lab: ABNORMAL
MCH RBC QN AUTO: 31.2 PG (ref 26.6–33)
MCHC RBC AUTO-ENTMCNC: 32.5 G/DL (ref 31.5–35.7)
MCV RBC AUTO: 95.9 FL (ref 79–97)
MODALITY: ABNORMAL
MONOCYTES # BLD AUTO: 0.46 10*3/MM3 (ref 0.1–0.9)
MONOCYTES NFR BLD AUTO: 7.1 % (ref 5–12)
NEUTROPHILS NFR BLD AUTO: 4.65 10*3/MM3 (ref 1.7–7)
NEUTROPHILS NFR BLD AUTO: 71.8 % (ref 42.7–76)
NRBC BLD AUTO-RTO: 0 /100 WBC (ref 0–0.2)
PCO2 BLDA: 36.1 MM HG (ref 35–45)
PCO2 TEMP ADJ BLD: 36.1 MM HG (ref 35–45)
PH BLDA: 7.4 PH UNITS (ref 7.35–7.45)
PH, TEMP CORRECTED: 7.4 PH UNITS (ref 7.35–7.45)
PLATELET # BLD AUTO: 157 10*3/MM3 (ref 140–450)
PMV BLD AUTO: 11.3 FL (ref 6–12)
PO2 BLDA: 90.9 MM HG (ref 83–108)
PO2 TEMP ADJ BLD: 90.9 MM HG (ref 83–108)
POTASSIUM SERPL-SCNC: 4.2 MMOL/L (ref 3.5–5.2)
PROT SERPL-MCNC: 7.1 G/DL (ref 6–8.5)
PROTHROMBIN TIME: 13 SECONDS (ref 11.8–14.8)
RBC # BLD AUTO: 4.91 10*6/MM3 (ref 4.14–5.8)
RH BLD: NEGATIVE
SAO2 % BLDCOA: 97.6 % (ref 94–99)
SODIUM SERPL-SCNC: 142 MMOL/L (ref 136–145)
T&S EXPIRATION DATE: NORMAL
VENTILATOR MODE: ABNORMAL
WBC NRBC COR # BLD AUTO: 6.48 10*3/MM3 (ref 3.4–10.8)

## 2024-02-19 PROCEDURE — 36600 WITHDRAWAL OF ARTERIAL BLOOD: CPT

## 2024-02-19 PROCEDURE — 93010 ELECTROCARDIOGRAM REPORT: CPT | Performed by: HOSPITALIST

## 2024-02-19 PROCEDURE — 86850 RBC ANTIBODY SCREEN: CPT

## 2024-02-19 PROCEDURE — 85025 COMPLETE CBC W/AUTO DIFF WBC: CPT

## 2024-02-19 PROCEDURE — 82803 BLOOD GASES ANY COMBINATION: CPT

## 2024-02-19 PROCEDURE — 86900 BLOOD TYPING SEROLOGIC ABO: CPT

## 2024-02-19 PROCEDURE — 86901 BLOOD TYPING SEROLOGIC RH(D): CPT

## 2024-02-19 PROCEDURE — 71046 X-RAY EXAM CHEST 2 VIEWS: CPT

## 2024-02-19 PROCEDURE — 80053 COMPREHEN METABOLIC PANEL: CPT

## 2024-02-19 PROCEDURE — 86920 COMPATIBILITY TEST SPIN: CPT

## 2024-02-19 PROCEDURE — 36415 COLL VENOUS BLD VENIPUNCTURE: CPT

## 2024-02-19 PROCEDURE — 85730 THROMBOPLASTIN TIME PARTIAL: CPT

## 2024-02-19 PROCEDURE — 93005 ELECTROCARDIOGRAM TRACING: CPT

## 2024-02-19 PROCEDURE — 85610 PROTHROMBIN TIME: CPT

## 2024-02-19 NOTE — DISCHARGE INSTRUCTIONS
Before you come to the hospital        Arrival time: AS DIRECTED BY OFFICE     YOU MAY TAKE THE FOLLOWING MEDICATION(S) THE MORNING OF SURGERY WITH A SIP OF WATER: NONE    ***HOLD LOSARTAN FOR 24 HOURS PRIOR TO SURGERY***           ALL OTHER HOME MEDICATION CHECK WITH YOUR PHYSICIAN (especially if   you are taking diabetes medicines or blood thinners)    Do not take any Erectile Dysfunction medications (EX: CIALIS, VIAGRA) 24 hours prior to surgery.      If you were given and instructed to use a germ- killing soap, use as directed the night before surgery and again the morning of surgery or as directed by your surgeon. (Use one-half of the bottle with each shower.)   See attached information for How to Use Chlorhexidine for Bathing if applicable.            Eating and drinking restrictions prior to scheduled arrival time    2 Hours before arrival time STOP   Drinking Clear liquids (water, black coffee-NO CREAM,  apple juice-no pulp)    Clear Liquids    Water and flavored water                                                                      Clear Fruit juices, such as cranberry juice and apple juice.  Black coffee (NO cream of any kind, including powdered).  Plain tea  Clear bouillon or broth.  Flavored gelatin.  Soda.  Gatorade or Powerade.    8 Hours before arrival time STOP   All food (includes candy, gum and mints), full liquids, and dairy products  Full liquid examples  Juices that have pulp.  Frozen ice pops that contain fruit pieces.  Coffee with creamer  Milk.  Yogurt.    (It is extremely important that you follow these guidelines to prevent delay or cancelation of your procedure)                       MANAGING PAIN AFTER SURGERY    We know you are probably wondering what your pain will be like after surgery.  Following surgery it is unrealistic to expect you will not have pain.   Pain is how our bodies let us know that something is wrong or cautions us to be careful.  That said, our goal is to make your  pain tolerable.    Methods we may use to treat your pain include (oral or IV medications, PCAs, epidurals, nerve blocks, etc.)   While some procedures require IV pain medications for a short time after surgery, transitioning to pain medications by mouth allows for better management of pain.   Your nurse will encourage you to take oral pain medications whenever possible.  IV medications work almost immediately, but only last a short while.  Taking medications by mouth allows for a more constant level of medication in your blood stream for a longer period of time.      Once your pain is out of control it is harder to get back under control.  It is important you are aware when your next dose of pain medication is due.  If you are admitted, your nurse may write the time of your next dose on the white board in your room to help you remember.      We are interested in your pain and encourage you to inform us about aggravating factors during your visit.   Many times a simple repositioning every few hours can make a big difference.    If your physician says it is okay, do not let your pain prevent you from getting out of bed. Be sure to call your nurse for assistance prior to getting up so you do not fall.      Before surgery, please decide your tolerable pain goal.  These faces help describe the pain ratings we use on a 0-10 scale.   Be prepared to tell us your goal and whether or not you take pain or anxiety medications at home.          Preparing for Surgery  Preparing for surgery is an important part of your care. It can make things go more smoothly and help you avoid complications. The steps leading up to surgery may vary among hospitals. Follow all instructions given to you by your health care providers. Ask questions if you do not understand something. Talk about any concerns that you have.  Here are some questions to consider asking before your surgery:  If my surgery is not an emergency (is elective), when would be  the best time to have the surgery?  What arrangements do I need to make for work, home, or school?  What will my recovery be like? How long will it be before I can return to normal activities?  Will I need to prepare my home? Will I need to arrange care for me or my children?  Should I expect to have pain after surgery? What are my pain management options? Are there nonmedical options that I can try for pain?  Tell a health care provider about:  Any allergies you have.  All medicines you are taking, including vitamins, herbs, eye drops, creams, and over-the-counter medicines.  Any problems you or family members have had with anesthetic medicines.  Any blood disorders you have.  Any surgeries you have had.  Any medical conditions you have.  Whether you are pregnant or may be pregnant.  What are the risks?  The risks and complications of surgery depend on the specific procedure that you have. Discuss all the risks with your health care providers before your surgery. Ask about common surgical complications, which may include:  Infection.  Bleeding or a need for blood replacement (transfusion).  Allergic reactions to medicines.  Damage to surrounding nerves, tissues, or structures.  A blood clot.  Scarring.  Failure of the surgery to correct the problem.  Follow these instructions before the procedure:  Several days or weeks before your procedure  You may have a physical exam by your primary health care provider to make sure it is safe for you to have surgery.  You may have testing. This may include a chest X-ray, blood and urine tests, electrocardiogram (ECG), or other testing.  Ask your health care provider about:  Changing or stopping your regular medicines. This is especially important if you are taking diabetes medicines or blood thinners.  Taking medicines such as aspirin and ibuprofen. These medicines can thin your blood. Do not take these medicines unless your health care provider tells you to take them.  Taking  over-the-counter medicines, vitamins, herbs, and supplements.  Do not use any products that contain nicotine or tobacco, such as cigarettes and e-cigarettes. If you need help quitting, ask your health care provider.  Avoid alcohol.  Ask your health care provider if there are exercises you can do to prepare for surgery.  Eat a healthy diet.   Plan to have someone 18 years of age or older to take you home from the hospital. We will need to verify your ride on the morning of surgery if you are being discharged home on the same day. Tell your ride to be expecting a call from the hospital prior to your procedure.   Plan to have a responsible adult care for you for at least 24 hours after you leave the hospital or clinic. This is important.  The day before your procedure  You may be given antibiotic medicine to take by mouth to help prevent infection. Take it as told by your health care provider.  You may be asked to shower with a germ-killing soap.  Follow instructions from your health care provider about eating and drinking restrictions.   Pack comfortable clothes according to your procedure.   The day of your procedure  You may need to take another shower with a germ-killing soap before you leave home in the morning.  With a small sip of water, take only the medicines that you are told to take.  Remove all jewelry including rings.   Leave anything you consider valuable at home except hearing aids if needed.  You do not need to bring your home medications into the hospital.   Do not wear any makeup, nail polish, powder, deodorant, lotion, hair accessories, or anything on your skin or body except your clothes.  If you will be staying in the hospital, bring a case to hold your glasses, contacts, or dentures. You may also want to bring your robe and non-skid footwear.       (Do not use denture adhesives since you will be asked to remove them during  surgery).   If you wear oxygen at home, bring it with you the day of  surgery.  If instructed by your health care provider, bring your sleep apnea device with you on the day of your surgery (if this applies to you).  You may want to leave your suitcase and sleep apnea device in the car until after surgery.   Arrive at the hospital as scheduled.  Bring a friend or family member with you who can help to answer questions and be present while you meet with your health care provider.  At the hospital  When you arrive at the hospital:  Go to registration located at the main entrance of the hospital. You will be registered and given a beeper and a sticker sheet. Take the stickers to the Outpatient nurses desk and place in the black tray. This is to notify staff that you have arrived. Then return to the lobby to wait.   When your beeper lights up and vibrates proceed through the double doors, under the stairs, and a member of the Outpatient Surgery staff will escort you to your preoperative room.  You may have to wear compression sleeves. These help to prevent blood clots and reduce swelling in your legs.  An IV may be inserted into one of your veins.              In the operating room, you may be given one or more of the following:        A medicine to help you relax (sedative).        A medicine to numb the area (local anesthetic).        A medicine to make you fall asleep (general anesthetic).        A medicine that is injected into an area of your body to numb everything below the                      injection site (regional anesthetic).  You may be given an antibiotic through your IV to help prevent infection.  Your surgical site will be marked or identified.    Contact a health care provider if you:  Develop a fever of more than 100.4°F (38°C) or other feelings of illness during the 48 hours before your surgery.  Have symptoms that get worse.  Have questions or concerns about your surgery.  Summary  Preparing for surgery can make the procedure go more smoothly and lower your risk of  complications.  Before surgery, make a list of questions and concerns to discuss with your surgeon. Ask about the risks and possible complications.  In the days or weeks before your surgery, follow all instructions from your health care provider. You may need to stop smoking, avoid alcohol, follow eating restrictions, and change or stop your regular medicines.  Contact your surgeon if you develop a fever or other signs of illness during the few days before your surgery.  This information is not intended to replace advice given to you by your health care provider. Make sure you discuss any questions you have with your health care provider.  Document Revised: 12/21/2018 Document Reviewed: 10/23/2018  [employer] Patient Education © 2021 [employer]           How to Use Chlorhexidine Before Surgery  Chlorhexidine gluconate (CHG) is a germ-killing (antiseptic) solution that is used to clean the skin. It can get rid of the bacteria that normally live on the skin and can keep them away for about 24 hours. To clean your skin with CHG, you may be given:  A CHG solution to use in the shower or as part of a sponge bath.  A prepackaged cloth that contains CHG.  Cleaning your skin with CHG may help lower the risk for infection:  While you are staying in the intensive care unit of the hospital.  If you have a vascular access, such as a central line, to provide short-term or long-term access to your veins.  If you have a catheter to drain urine from your bladder.  If you are on a ventilator. A ventilator is a machine that helps you breathe by moving air in and out of your lungs.  After surgery.  What are the risks?  Risks of using CHG include:  A skin reaction.  Hearing loss, if CHG gets in your ears and you have a perforated eardrum.  Eye injury, if CHG gets in your eyes and is not rinsed out.  The CHG product catching fire.  Make sure that you avoid smoking and flames after applying CHG to your skin.  Do not use CHG:  If you  have a chlorhexidine allergy or have previously reacted to chlorhexidine.  On babies younger than 2 months of age.  How to use CHG solution  Use CHG only as told by your health care provider, and follow the instructions on the label.  Use the full amount of CHG as directed. Usually, this is one bottle.  During a shower    Follow these steps when using CHG solution during a shower (unless your health care provider gives you different instructions):  Start the shower.  Use your normal soap and shampoo to wash your face and hair.  Turn off the shower or move out of the shower stream.  Pour the CHG onto a clean washcloth. Do not use any type of brush or rough-edged sponge.  Starting at your neck, lather your body down to your toes. Make sure you follow these instructions:  If you will be having surgery, pay special attention to the part of your body where you will be having surgery. Scrub this area for at least 1 minute.  Do not use CHG on your head or face. If the solution gets into your ears or eyes, rinse them well with water.  Avoid your genital area.  Avoid any areas of skin that have broken skin, cuts, or scrapes.  Scrub your back and under your arms. Make sure to wash skin folds.  Let the lather sit on your skin for 1-2 minutes or as long as told by your health care provider.  Thoroughly rinse your entire body in the shower. Make sure that all body creases and crevices are rinsed well.  Dry off with a clean towel. Do not put any substances on your body afterward--such as powder, lotion, or perfume--unless you are told to do so by your health care provider. Only use lotions that are recommended by the .  Put on clean clothes or pajamas.  If it is the night before your surgery, sleep in clean sheets.     During a sponge bath  Follow these steps when using CHG solution during a sponge bath (unless your health care provider gives you different instructions):  Use your normal soap and shampoo to wash your  face and hair.  Pour the CHG onto a clean washcloth.  Starting at your neck, lather your body down to your toes. Make sure you follow these instructions:  If you will be having surgery, pay special attention to the part of your body where you will be having surgery. Scrub this area for at least 1 minute.  Do not use CHG on your head or face. If the solution gets into your ears or eyes, rinse them well with water.  Avoid your genital area.  Avoid any areas of skin that have broken skin, cuts, or scrapes.  Scrub your back and under your arms. Make sure to wash skin folds.  Let the lather sit on your skin for 1-2 minutes or as long as told by your health care provider.  Using a different clean, wet washcloth, thoroughly rinse your entire body. Make sure that all body creases and crevices are rinsed well.  Dry off with a clean towel. Do not put any substances on your body afterward--such as powder, lotion, or perfume--unless you are told to do so by your health care provider. Only use lotions that are recommended by the .  Put on clean clothes or pajamas.  If it is the night before your surgery, sleep in clean sheets.  How to use CHG prepackaged cloths  Only use CHG cloths as told by your health care provider, and follow the instructions on the label.  Use the CHG cloth on clean, dry skin.  Do not use the CHG cloth on your head or face unless your health care provider tells you to.  When washing with the CHG cloth:  Avoid your genital area.  Avoid any areas of skin that have broken skin, cuts, or scrapes.  Before surgery    Follow these steps when using a CHG cloth to clean before surgery (unless your health care provider gives you different instructions):  Using the CHG cloth, vigorously scrub the part of your body where you will be having surgery. Scrub using a back-and-forth motion for 3 minutes. The area on your body should be completely wet with CHG when you are done scrubbing.  Do not rinse. Discard  the cloth and let the area air-dry. Do not put any substances on the area afterward, such as powder, lotion, or perfume.  Put on clean clothes or pajamas.  If it is the night before your surgery, sleep in clean sheets.     For general bathing  Follow these steps when using CHG cloths for general bathing (unless your health care provider gives you different instructions).  Use a separate CHG cloth for each area of your body. Make sure you wash between any folds of skin and between your fingers and toes. Wash your body in the following order, switching to a new cloth after each step:  The front of your neck, shoulders, and chest.  Both of your arms, under your arms, and your hands.  Your stomach and groin area, avoiding the genitals.  Your right leg and foot.  Your left leg and foot.  The back of your neck, your back, and your buttocks.  Do not rinse. Discard the cloth and let the area air-dry. Do not put any substances on your body afterward--such as powder, lotion, or perfume--unless you are told to do so by your health care provider. Only use lotions that are recommended by the .  Put on clean clothes or pajamas.  Contact a health care provider if:  Your skin gets irritated after scrubbing.  You have questions about using your solution or cloth.  You swallow any chlorhexidine. Call your local poison control center (1-237.972.1204 in the [place]  Get help right away if:  Your eyes itch badly, or they become very red or swollen.  Your skin itches badly and is red or swollen.  Your hearing changes.  You have trouble seeing.  You have swelling or tingling in your mouth or throat.  You have trouble breathing.  These symptoms may represent a serious problem that is an emergency. Do not wait to see if the symptoms will go away. Get medical help right away. Call your local emergency services (094 in the [place] Do not drive yourself to the hospital.  Summary  Chlorhexidine gluconate (CHG) is a germ-killing  (antiseptic) solution that is used to clean the skin. Cleaning your skin with CHG may help to lower your risk for infection.  You may be given CHG to use for bathing. It may be in a bottle or in a prepackaged cloth to use on your skin. Carefully follow your health care provider's instructions and the instructions on the product label.  Do not use CHG if you have a chlorhexidine allergy.  Contact your health care provider if your skin gets irritated after scrubbing.  This information is not intended to replace advice given to you by your health care provider. Make sure you discuss any questions you have with your health care provider.  Document Revised: 04/17/2023 Document Reviewed: 02/28/2022  [employer] Patient Education © 2023 [employer]

## 2024-02-19 NOTE — ANESTHESIA PREPROCEDURE EVALUATION
Anesthesia Evaluation     Patient summary reviewed   no history of anesthetic complications:   NPO Solid Status: > 8 hours             Airway   Mallampati: II  TM distance: >3 FB  No difficulty expected  Dental          Pulmonary - negative pulmonary ROS   (-) asthma, sleep apnea, not a smoker  Cardiovascular   Exercise tolerance: poor (<4 METS)    (+) hypertension, CAD, dysrhythmias PVC, angina, CHF Systolic <55%, hyperlipidemia  (-) cardiac stents    ROS comment: Cardiac cath:   Conclusion 2/5/2024        Distal left main coronary artery is calcified with 50% stenosis  Ostial left anterior descending coronary artery is moderate calcification with 70% stenosis  Proximal left circumflex coronary artery has 60-70 % stenosis best visualized in BRIAN caudal view  Subbranch of first obtuse marginal branch has 90% stenosis  No significant disease of second obtuse marginal branch  Right coronary artery is dominant  Moderate calcification noted  Distal right coronary artery has 60 to 70% eccentric stenosis  Bilateral subclavian arteries and internal mammary arteries are patent  Aortic arch angiography does not show any significant dilatation or dissection  Left ventricular end-diastolic pressure normal at 9 mmHg with no gradient across aortic valve on pullback  Left ventriculography shows ejection fraction of approximately 50% with inferior apical hypokinesis  No significant mitral regurgitation     Summary     Multivessel coronary artery disease as described above    Echo:  ·  Left ventricular systolic function is mildly decreased. Calculated left ventricular EF = 47% Left ventricular ejection fraction appears to be 46 - 50%.  ·  Left ventricular diastolic function was indeterminate.  ·  The left atrial cavity is mildly dilated.  ·  The right atrial cavity is mildly  dilated.  ·  Estimated right ventricular systolic pressure from tricuspid regurgitation is normal (<35 mmHg).  ·  Abnormal global longitudinal LV strain (GLS)  = -11.6%.        Neuro/Psych  (-) seizures, TIA, CVA  GI/Hepatic/Renal/Endo    (-) liver disease, no renal disease, diabetes    Musculoskeletal     Abdominal    Substance History      OB/GYN          Other                      Anesthesia Plan    ASA 4     general, [place] and CVL     (No CI to JACKELYN)  intravenous induction     Anesthetic plan, risks, benefits, and alternatives have been provided, discussed and informed consent has been obtained with: patient.    Use of blood products discussed with patient  Consented to blood products.      CODE STATUS:

## 2024-02-20 ENCOUNTER — HOSPITAL ENCOUNTER (INPATIENT)
Facility: HOSPITAL | Age: 69
LOS: 5 days | Discharge: HOME | DRG: 236 | End: 2024-02-25
Attending: SURGERY | Admitting: SURGERY
Payer: MEDICARE

## 2024-02-20 ENCOUNTER — APPOINTMENT (OUTPATIENT)
Dept: CARDIOLOGY | Facility: HOSPITAL | Age: 69
DRG: 236 | End: 2024-02-20
Payer: MEDICARE

## 2024-02-20 ENCOUNTER — ANESTHESIA (OUTPATIENT)
Dept: PERIOP | Facility: HOSPITAL | Age: 69
End: 2024-02-20
Payer: MEDICARE

## 2024-02-20 ENCOUNTER — APPOINTMENT (OUTPATIENT)
Dept: GENERAL RADIOLOGY | Facility: HOSPITAL | Age: 69
DRG: 236 | End: 2024-02-20
Payer: MEDICARE

## 2024-02-20 DIAGNOSIS — I25.110 CORONARY ARTERY DISEASE INVOLVING NATIVE CORONARY ARTERY OF NATIVE HEART WITH UNSTABLE ANGINA PECTORIS: ICD-10-CM

## 2024-02-20 DIAGNOSIS — I25.118 CORONARY ARTERY DISEASE INVOLVING NATIVE CORONARY ARTERY OF NATIVE HEART WITH OTHER FORM OF ANGINA PECTORIS: ICD-10-CM

## 2024-02-20 DIAGNOSIS — Z74.09 IMPAIRED MOBILITY: Primary | ICD-10-CM

## 2024-02-20 PROBLEM — I25.10 CAD (CORONARY ARTERY DISEASE), NATIVE CORONARY ARTERY: Status: ACTIVE | Noted: 2024-02-20

## 2024-02-20 PROBLEM — I25.10 CAD (CORONARY ARTERY DISEASE): Status: ACTIVE | Noted: 2024-02-20

## 2024-02-20 LAB
A-A DO2: 311.1 MMHG
ALBUMIN SERPL-MCNC: 3.7 G/DL (ref 3.5–5.2)
ALBUMIN SERPL-MCNC: 3.9 G/DL (ref 3.5–5.2)
ANION GAP SERPL CALCULATED.3IONS-SCNC: 11 MMOL/L (ref 5–15)
ANION GAP SERPL CALCULATED.3IONS-SCNC: 9 MMOL/L (ref 5–15)
APTT PPP: 37.2 SECONDS (ref 24.5–36)
ARTERIAL PATENCY WRIST A: ABNORMAL
ATMOSPHERIC PRESS: 754 MMHG
ATMOSPHERIC PRESS: 755 MMHG
ATMOSPHERIC PRESS: 755 MMHG
ATMOSPHERIC PRESS: 756 MMHG
BASE EXCESS BLDA CALC-SCNC: -1.4 MMOL/L (ref 0–2)
BASE EXCESS BLDA CALC-SCNC: -1.8 MMOL/L (ref 0–2)
BASE EXCESS BLDA CALC-SCNC: -3 MMOL/L (ref 0–2)
BASE EXCESS BLDA CALC-SCNC: 0 MMOL/L (ref 0–2)
BASE EXCESS BLDA CALC-SCNC: 0 MMOL/L (ref 0–2)
BASE EXCESS BLDA CALC-SCNC: 0.1 MMOL/L (ref 0–2)
BASE EXCESS BLDA CALC-SCNC: 1.9 MMOL/L (ref 0–2)
BASE EXCESS BLDA CALC-SCNC: 3.3 MMOL/L (ref 0–2)
BASOPHILS # BLD AUTO: 0.03 10*3/MM3 (ref 0–0.2)
BASOPHILS NFR BLD AUTO: 0.3 % (ref 0–1.5)
BDY SITE: ABNORMAL
BODY TEMPERATURE: 37
BUN SERPL-MCNC: 14 MG/DL (ref 8–23)
BUN SERPL-MCNC: 14 MG/DL (ref 8–23)
BUN/CREAT SERPL: 13.9 (ref 7–25)
BUN/CREAT SERPL: 15.6 (ref 7–25)
CA-I BLD-MCNC: 4.29 MG/DL (ref 4.6–5.4)
CA-I BLD-MCNC: 4.3 MG/DL (ref 4.6–5.4)
CA-I BLD-MCNC: 4.34 MG/DL (ref 4.6–5.4)
CA-I BLD-MCNC: 4.53 MG/DL (ref 4.6–5.4)
CA-I BLD-MCNC: 5.26 MG/DL (ref 4.6–5.4)
CA-I BLD-MCNC: 5.95 MG/DL (ref 4.6–5.4)
CALCIUM SPEC-SCNC: 9.5 MG/DL (ref 8.6–10.5)
CALCIUM SPEC-SCNC: 9.7 MG/DL (ref 8.6–10.5)
CHLORIDE SERPL-SCNC: 108 MMOL/L (ref 98–107)
CHLORIDE SERPL-SCNC: 110 MMOL/L (ref 98–107)
CO2 SERPL-SCNC: 23 MMOL/L (ref 22–29)
CO2 SERPL-SCNC: 25 MMOL/L (ref 22–29)
COHGB MFR BLD: 0.9 % (ref 0–5)
COHGB MFR BLD: 1.1 % (ref 0–5)
COHGB MFR BLD: 1.2 % (ref 0–5)
COHGB MFR BLD: <1 % (ref 0–5)
CREAT SERPL-MCNC: 0.9 MG/DL (ref 0.76–1.27)
CREAT SERPL-MCNC: 1.01 MG/DL (ref 0.76–1.27)
D-LACTATE SERPL-SCNC: 1.4 MMOL/L (ref 0.5–2)
D-LACTATE SERPL-SCNC: 3.1 MMOL/L (ref 0.5–2)
DEPRECATED RDW RBC AUTO: 44.6 FL (ref 37–54)
DEPRECATED RDW RBC AUTO: 46 FL (ref 37–54)
EGFRCR SERPLBLD CKD-EPI 2021: 81 ML/MIN/1.73
EGFRCR SERPLBLD CKD-EPI 2021: 93 ML/MIN/1.73
EOSINOPHIL # BLD AUTO: 0.13 10*3/MM3 (ref 0–0.4)
EOSINOPHIL NFR BLD AUTO: 1.2 % (ref 0.3–6.2)
ERYTHROCYTE [DISTWIDTH] IN BLOOD BY AUTOMATED COUNT: 12.8 % (ref 12.3–15.4)
ERYTHROCYTE [DISTWIDTH] IN BLOOD BY AUTOMATED COUNT: 12.9 % (ref 12.3–15.4)
FIBRINOGEN PPP-MCNC: 298 MG/DL (ref 240–460)
GAS FLOW AIRWAY: 2.3 LPM
GAS FLOW AIRWAY: 2.3 LPM
GLUCOSE BLDC GLUCOMTR-MCNC: 152 MG/DL (ref 70–130)
GLUCOSE BLDC GLUCOMTR-MCNC: 154 MG/DL (ref 70–130)
GLUCOSE BLDC GLUCOMTR-MCNC: 155 MG/DL (ref 70–130)
GLUCOSE BLDC GLUCOMTR-MCNC: 156 MG/DL (ref 70–130)
GLUCOSE BLDC GLUCOMTR-MCNC: 157 MG/DL (ref 70–130)
GLUCOSE BLDC GLUCOMTR-MCNC: 170 MG/DL (ref 70–130)
GLUCOSE SERPL-MCNC: 131 MG/DL (ref 65–99)
GLUCOSE SERPL-MCNC: 169 MG/DL (ref 65–99)
HCO3 BLDA-SCNC: 23.1 MMOL/L (ref 20–26)
HCO3 BLDA-SCNC: 23.2 MMOL/L (ref 20–26)
HCO3 BLDA-SCNC: 24 MMOL/L (ref 20–26)
HCO3 BLDA-SCNC: 24.1 MMOL/L (ref 20–26)
HCO3 BLDA-SCNC: 24.3 MMOL/L (ref 20–26)
HCO3 BLDA-SCNC: 24.6 MMOL/L (ref 20–26)
HCO3 BLDA-SCNC: 25.9 MMOL/L (ref 20–26)
HCO3 BLDA-SCNC: 28.1 MMOL/L (ref 20–26)
HCT VFR BLD AUTO: 37.6 % (ref 37.5–51)
HCT VFR BLD AUTO: 37.9 % (ref 37.5–51)
HCT VFR BLD CALC: 32.6 % (ref 38–51)
HCT VFR BLD CALC: 33.2 % (ref 38–51)
HCT VFR BLD CALC: 34.5 % (ref 38–51)
HCT VFR BLD CALC: 40.7 % (ref 38–51)
HCT VFR BLD CALC: 41.5 % (ref 38–51)
HCT VFR BLD CALC: 42 % (ref 38–51)
HGB BLD-MCNC: 12.5 G/DL (ref 13–17.7)
HGB BLD-MCNC: 12.6 G/DL (ref 13–17.7)
HGB BLDA-MCNC: 10.6 G/DL (ref 14–18)
HGB BLDA-MCNC: 10.8 G/DL (ref 14–18)
HGB BLDA-MCNC: 11.3 G/DL (ref 14–18)
HGB BLDA-MCNC: 13.3 G/DL (ref 14–18)
HGB BLDA-MCNC: 13.5 G/DL (ref 14–18)
HGB BLDA-MCNC: 13.7 G/DL (ref 14–18)
INHALED O2 CONCENTRATION: 100 %
INHALED O2 CONCENTRATION: 30 %
INHALED O2 CONCENTRATION: 60 %
INHALED O2 CONCENTRATION: 60 %
INR PPP: 1.26 (ref 0.91–1.09)
LYMPHOCYTES # BLD AUTO: 1.15 10*3/MM3 (ref 0.7–3.1)
LYMPHOCYTES NFR BLD AUTO: 10.6 % (ref 19.6–45.3)
Lab: ABNORMAL
Lab: NORMAL
MAGNESIUM SERPL-MCNC: 2.3 MG/DL (ref 1.6–2.4)
MCH RBC QN AUTO: 31.8 PG (ref 26.6–33)
MCH RBC QN AUTO: 32.1 PG (ref 26.6–33)
MCHC RBC AUTO-ENTMCNC: 33 G/DL (ref 31.5–35.7)
MCHC RBC AUTO-ENTMCNC: 33.5 G/DL (ref 31.5–35.7)
MCV RBC AUTO: 95.9 FL (ref 79–97)
MCV RBC AUTO: 96.4 FL (ref 79–97)
METHGB BLD QL: 0.5 % (ref 0–3)
METHGB BLD QL: 0.7 % (ref 0–3)
METHGB BLD QL: 0.8 % (ref 0–3)
METHGB BLD QL: <1 % (ref 0–3)
MODALITY: ABNORMAL
MONOCYTES # BLD AUTO: 0.75 10*3/MM3 (ref 0.1–0.9)
MONOCYTES NFR BLD AUTO: 6.9 % (ref 5–12)
NEUTROPHILS NFR BLD AUTO: 8.74 10*3/MM3 (ref 1.7–7)
NEUTROPHILS NFR BLD AUTO: 80.5 % (ref 42.7–76)
OXYHGB MFR BLDV: 93.3 % (ref 94–99)
OXYHGB MFR BLDV: 97.7 % (ref 94–99)
OXYHGB MFR BLDV: 98.3 % (ref 94–99)
OXYHGB MFR BLDV: 98.7 % (ref 94–99)
OXYHGB MFR BLDV: 98.7 % (ref 94–99)
OXYHGB MFR BLDV: 98.9 % (ref 94–99)
PCO2 BLDA: 32.5 MM HG (ref 35–45)
PCO2 BLDA: 37.2 MM HG (ref 35–45)
PCO2 BLDA: 37.5 MM HG (ref 35–45)
PCO2 BLDA: 38.8 MM HG (ref 35–45)
PCO2 BLDA: 39 MM HG (ref 35–45)
PCO2 BLDA: 42 MM HG (ref 35–45)
PCO2 BLDA: 42.9 MM HG (ref 35–45)
PCO2 BLDA: 50.5 MM HG (ref 35–45)
PCO2 TEMP ADJ BLD: 32.5 MM HG (ref 35–45)
PCO2 TEMP ADJ BLD: 37.2 MM HG (ref 35–45)
PCO2 TEMP ADJ BLD: 37.5 MM HG (ref 35–45)
PCO2 TEMP ADJ BLD: 38.8 MM HG (ref 35–45)
PCO2 TEMP ADJ BLD: 39 MM HG (ref 35–45)
PCO2 TEMP ADJ BLD: 42 MM HG (ref 35–45)
PCO2 TEMP ADJ BLD: 42.9 MM HG (ref 35–45)
PCO2 TEMP ADJ BLD: 50.5 MM HG (ref 35–45)
PEEP RESPIRATORY: 10 CM[H2O]
PEEP RESPIRATORY: 10 CM[H2O]
PEEP RESPIRATORY: 8 CM[H2O]
PH BLDA: 7.29 PH UNITS (ref 7.35–7.45)
PH BLDA: 7.36 PH UNITS (ref 7.35–7.45)
PH BLDA: 7.38 PH UNITS (ref 7.35–7.45)
PH BLDA: 7.41 PH UNITS (ref 7.35–7.45)
PH BLDA: 7.42 PH UNITS (ref 7.35–7.45)
PH BLDA: 7.42 PH UNITS (ref 7.35–7.45)
PH BLDA: 7.45 PH UNITS (ref 7.35–7.45)
PH BLDA: 7.46 PH UNITS (ref 7.35–7.45)
PH, TEMP CORRECTED: 7.29 PH UNITS (ref 7.35–7.45)
PH, TEMP CORRECTED: 7.36 PH UNITS (ref 7.35–7.45)
PH, TEMP CORRECTED: 7.38 PH UNITS (ref 7.35–7.45)
PH, TEMP CORRECTED: 7.41 PH UNITS (ref 7.35–7.45)
PH, TEMP CORRECTED: 7.42 PH UNITS (ref 7.35–7.45)
PH, TEMP CORRECTED: 7.42 PH UNITS (ref 7.35–7.45)
PH, TEMP CORRECTED: 7.45 PH UNITS (ref 7.35–7.45)
PH, TEMP CORRECTED: 7.46 PH UNITS (ref 7.35–7.45)
PHOSPHATE SERPL-MCNC: 2.3 MG/DL (ref 2.5–4.5)
PHOSPHATE SERPL-MCNC: 3.8 MG/DL (ref 2.5–4.5)
PLATELET # BLD AUTO: 108 10*3/MM3 (ref 140–450)
PLATELET # BLD AUTO: 129 10*3/MM3 (ref 140–450)
PMV BLD AUTO: 10.8 FL (ref 6–12)
PMV BLD AUTO: 11.2 FL (ref 6–12)
PO2 BLDA: 151 MM HG (ref 83–108)
PO2 BLDA: 156 MM HG (ref 83–108)
PO2 BLDA: 336 MM HG (ref 83–108)
PO2 BLDA: 371 MM HG (ref 83–108)
PO2 BLDA: 72.2 MM HG (ref 83–108)
PO2 BLDA: 91.8 MM HG (ref 83–108)
PO2 BLDA: >547 MM HG (ref 83–108)
PO2 BLDA: >547 MM HG (ref 83–108)
PO2 TEMP ADJ BLD: 151 MM HG (ref 83–108)
PO2 TEMP ADJ BLD: 156 MM HG (ref 83–108)
PO2 TEMP ADJ BLD: 336 MM HG (ref 83–108)
PO2 TEMP ADJ BLD: 371 MM HG (ref 83–108)
PO2 TEMP ADJ BLD: 72.2 MM HG (ref 83–108)
PO2 TEMP ADJ BLD: 91.8 MM HG (ref 83–108)
PO2 TEMP ADJ BLD: >547 MM HG (ref 83–108)
PO2 TEMP ADJ BLD: >547 MM HG (ref 83–108)
POTASSIUM BLDA-SCNC: 3.8 MMOL/L (ref 3.5–5.2)
POTASSIUM BLDA-SCNC: 4.1 MMOL/L (ref 3.5–5.2)
POTASSIUM BLDA-SCNC: 4.2 MMOL/L (ref 3.5–5.2)
POTASSIUM BLDA-SCNC: 4.3 MMOL/L (ref 3.5–5.2)
POTASSIUM BLDA-SCNC: 5.3 MMOL/L (ref 3.5–5.2)
POTASSIUM BLDA-SCNC: 5.7 MMOL/L (ref 3.5–5.2)
POTASSIUM SERPL-SCNC: 4.2 MMOL/L (ref 3.5–5.2)
POTASSIUM SERPL-SCNC: 4.4 MMOL/L (ref 3.5–5.2)
PROTHROMBIN TIME: 15.9 SECONDS (ref 11.8–14.8)
PSV: 10 CMH2O
QT INTERVAL: 406 MS
QTC INTERVAL: 441 MS
RBC # BLD AUTO: 3.92 10*6/MM3 (ref 4.14–5.8)
RBC # BLD AUTO: 3.93 10*6/MM3 (ref 4.14–5.8)
SAO2 % BLDCOA: 100 % (ref 94–99)
SAO2 % BLDCOA: 95 % (ref 94–99)
SAO2 % BLDCOA: 97.4 % (ref 94–99)
SAO2 % BLDCOA: 99.5 % (ref 94–99)
SAO2 % BLDCOA: 99.7 % (ref 94–99)
SAO2 % BLDCOA: >100.1 % (ref 94–99)
SET MECH RESP RATE: 20
SET MECH RESP RATE: 20
SODIUM BLDA-SCNC: 141 MMOL/L (ref 136–145)
SODIUM BLDA-SCNC: 142 MMOL/L (ref 136–145)
SODIUM BLDA-SCNC: 143 MMOL/L (ref 136–145)
SODIUM BLDA-SCNC: 143 MMOL/L (ref 136–145)
SODIUM BLDA-SCNC: 144 MMOL/L (ref 136–145)
SODIUM BLDA-SCNC: 145 MMOL/L (ref 136–145)
SODIUM SERPL-SCNC: 142 MMOL/L (ref 136–145)
SODIUM SERPL-SCNC: 144 MMOL/L (ref 136–145)
VENTILATOR MODE: ABNORMAL
VT ON VENT VENT: 550 ML
VT ON VENT VENT: 550 ML
WBC NRBC COR # BLD AUTO: 10.85 10*3/MM3 (ref 3.4–10.8)
WBC NRBC COR # BLD AUTO: 7.35 10*3/MM3 (ref 3.4–10.8)

## 2024-02-20 PROCEDURE — 25810000003 SODIUM CHLORIDE 0.9 % SOLUTION 250 ML FLEX CONT

## 2024-02-20 PROCEDURE — 25010000002 VASOPRESSIN 20 UNIT/ML SOLUTION

## 2024-02-20 PROCEDURE — 25010000002 ALBUMIN HUMAN 5% PER 50 ML: Performed by: SURGERY

## 2024-02-20 PROCEDURE — 93005 ELECTROCARDIOGRAM TRACING: CPT | Performed by: SURGERY

## 2024-02-20 PROCEDURE — 63710000001 INSULIN REGULAR HUMAN PER 5 UNITS

## 2024-02-20 PROCEDURE — 93318 ECHO TRANSESOPHAGEAL INTRAOP: CPT

## 2024-02-20 PROCEDURE — 06BQ4ZZ EXCISION OF LEFT SAPHENOUS VEIN, PERCUTANEOUS ENDOSCOPIC APPROACH: ICD-10-PCS | Performed by: SURGERY

## 2024-02-20 PROCEDURE — 25810000003 LACTATED RINGERS PER 1000 ML

## 2024-02-20 PROCEDURE — 82375 ASSAY CARBOXYHB QUANT: CPT

## 2024-02-20 PROCEDURE — 25810000003 DEXTROSE 5 % WITH KCL 20 MEQ 20 MEQ/L SOLUTION: Performed by: SURGERY

## 2024-02-20 PROCEDURE — 25010000002 CEFAZOLIN PER 500 MG: Performed by: SURGERY

## 2024-02-20 PROCEDURE — 33534 CABG ARTERIAL TWO: CPT | Performed by: SURGERY

## 2024-02-20 PROCEDURE — 25010000002 SUGAMMADEX 200 MG/2ML SOLUTION

## 2024-02-20 PROCEDURE — 93325 DOPPLER ECHO COLOR FLOW MAPG: CPT | Performed by: HOSPITALIST

## 2024-02-20 PROCEDURE — C1713 ANCHOR/SCREW BN/BN,TIS/BN: HCPCS | Performed by: SURGERY

## 2024-02-20 PROCEDURE — C1751 CATH, INF, PER/CENT/MIDLINE: HCPCS

## 2024-02-20 PROCEDURE — 83735 ASSAY OF MAGNESIUM: CPT | Performed by: SURGERY

## 2024-02-20 PROCEDURE — 94640 AIRWAY INHALATION TREATMENT: CPT

## 2024-02-20 PROCEDURE — 25010000002 VANCOMYCIN 1 G RECONSTITUTED SOLUTION 1 EACH VIAL: Performed by: SURGERY

## 2024-02-20 PROCEDURE — 85610 PROTHROMBIN TIME: CPT | Performed by: SURGERY

## 2024-02-20 PROCEDURE — 83050 HGB METHEMOGLOBIN QUAN: CPT

## 2024-02-20 PROCEDURE — 021109W BYPASS CORONARY ARTERY, TWO ARTERIES FROM AORTA WITH AUTOLOGOUS VENOUS TISSUE, OPEN APPROACH: ICD-10-PCS | Performed by: SURGERY

## 2024-02-20 PROCEDURE — 94799 UNLISTED PULMONARY SVC/PX: CPT

## 2024-02-20 PROCEDURE — P9041 ALBUMIN (HUMAN),5%, 50ML: HCPCS | Performed by: SURGERY

## 2024-02-20 PROCEDURE — 25010000002 HEPARIN (PORCINE) PER 1000 UNITS: Performed by: SURGERY

## 2024-02-20 PROCEDURE — 0 BUPIVACAINE LIPOSOME 1.3 % SUSPENSION 20 ML VIAL: Performed by: SURGERY

## 2024-02-20 PROCEDURE — 85730 THROMBOPLASTIN TIME PARTIAL: CPT | Performed by: SURGERY

## 2024-02-20 PROCEDURE — 83605 ASSAY OF LACTIC ACID: CPT | Performed by: SURGERY

## 2024-02-20 PROCEDURE — 25810000003 SODIUM CHLORIDE 0.9 % SOLUTION

## 2024-02-20 PROCEDURE — 25010000002 HEPARIN (PORCINE) PER 1000 UNITS

## 2024-02-20 PROCEDURE — 25810000003 LACTATED RINGERS PER 1000 ML: Performed by: ANESTHESIOLOGY

## 2024-02-20 PROCEDURE — 85384 FIBRINOGEN ACTIVITY: CPT | Performed by: SURGERY

## 2024-02-20 PROCEDURE — 33508 ENDOSCOPIC VEIN HARVEST: CPT | Performed by: SURGERY

## 2024-02-20 PROCEDURE — 5A1221Z PERFORMANCE OF CARDIAC OUTPUT, CONTINUOUS: ICD-10-PCS | Performed by: SURGERY

## 2024-02-20 PROCEDURE — 94002 VENT MGMT INPAT INIT DAY: CPT

## 2024-02-20 PROCEDURE — 02100Z9 BYPASS CORONARY ARTERY, ONE ARTERY FROM LEFT INTERNAL MAMMARY, OPEN APPROACH: ICD-10-PCS | Performed by: SURGERY

## 2024-02-20 PROCEDURE — P9047 ALBUMIN (HUMAN), 25%, 50ML: HCPCS

## 2024-02-20 PROCEDURE — 71045 X-RAY EXAM CHEST 1 VIEW: CPT

## 2024-02-20 PROCEDURE — 02100Z8 BYPASS CORONARY ARTERY, ONE ARTERY FROM RIGHT INTERNAL MAMMARY, OPEN APPROACH: ICD-10-PCS | Performed by: SURGERY

## 2024-02-20 PROCEDURE — 33518 CABG ARTERY-VEIN TWO: CPT | Performed by: SURGERY

## 2024-02-20 PROCEDURE — 85027 COMPLETE CBC AUTOMATED: CPT | Performed by: SURGERY

## 2024-02-20 PROCEDURE — 25010000002 PHENYLEPHRINE 10 MG/ML SOLUTION

## 2024-02-20 PROCEDURE — 25010000002 CEFAZOLIN PER 500 MG: Performed by: NURSE PRACTITIONER

## 2024-02-20 PROCEDURE — 25010000002 POTASSIUM CHLORIDE PER 2 MEQ OF POTASSIUM

## 2024-02-20 PROCEDURE — 25810000003 LACTATED RINGERS PER 1000 ML: Performed by: SURGERY

## 2024-02-20 PROCEDURE — 25010000002 MORPHINE PER 10 MG: Performed by: SURGERY

## 2024-02-20 PROCEDURE — C9290 INJ, BUPIVACAINE LIPOSOME: HCPCS | Performed by: SURGERY

## 2024-02-20 PROCEDURE — 80069 RENAL FUNCTION PANEL: CPT | Performed by: SURGERY

## 2024-02-20 PROCEDURE — 93312 ECHO TRANSESOPHAGEAL: CPT | Performed by: HOSPITALIST

## 2024-02-20 PROCEDURE — 25010000002 PROTAMINE SULFATE PER 10 MG

## 2024-02-20 PROCEDURE — 0 INSULIN REGULAR HUMAN PER 5 UNITS: Performed by: SURGERY

## 2024-02-20 PROCEDURE — 82330 ASSAY OF CALCIUM: CPT

## 2024-02-20 PROCEDURE — 25010000002 MIDAZOLAM PER 1 MG

## 2024-02-20 PROCEDURE — 82805 BLOOD GASES W/O2 SATURATION: CPT

## 2024-02-20 PROCEDURE — 82948 REAGENT STRIP/BLOOD GLUCOSE: CPT

## 2024-02-20 PROCEDURE — 25010000002 NITROGLYCERIN 200 MCG/ML SOLUTION: Performed by: SURGERY

## 2024-02-20 PROCEDURE — 25010000002 ACETAMINOPHEN 10 MG/ML SOLUTION: Performed by: SURGERY

## 2024-02-20 PROCEDURE — 25010000002 PAPAVERINE PER 60 MG: Performed by: SURGERY

## 2024-02-20 PROCEDURE — 25010000002 MIDAZOLAM PER 1 MG: Performed by: ANESTHESIOLOGY

## 2024-02-20 PROCEDURE — 85025 COMPLETE CBC W/AUTO DIFF WBC: CPT | Performed by: SURGERY

## 2024-02-20 PROCEDURE — B24BZZ4 ULTRASONOGRAPHY OF HEART WITH AORTA, TRANSESOPHAGEAL: ICD-10-PCS | Performed by: SURGERY

## 2024-02-20 PROCEDURE — 93010 ELECTROCARDIOGRAM REPORT: CPT | Performed by: EMERGENCY MEDICINE

## 2024-02-20 PROCEDURE — A4648 IMPLANTABLE TISSUE MARKER: HCPCS | Performed by: SURGERY

## 2024-02-20 PROCEDURE — 25010000002 MANNITOL PER 50 ML

## 2024-02-20 PROCEDURE — 25010000002 ALBUMIN HUMAN 25% PER 50 ML

## 2024-02-20 PROCEDURE — 82803 BLOOD GASES ANY COMBINATION: CPT

## 2024-02-20 PROCEDURE — 25010000002 FUROSEMIDE PER 20 MG

## 2024-02-20 PROCEDURE — 25010000002 NITROGLYCERIN 200 MCG/ML SOLUTION

## 2024-02-20 DEVICE — PLEDGET INCISIONLINE REINF TFE SFT PTFE 1.5X3X7MM WHT: Type: IMPLANTABLE DEVICE | Site: HEART | Status: FUNCTIONAL

## 2024-02-20 DEVICE — WAX BONE HEMO NAT 2.5G: Type: IMPLANTABLE DEVICE | Site: CHEST | Status: FUNCTIONAL

## 2024-02-20 DEVICE — KT STERNALOCK XP X/PLATE: Type: IMPLANTABLE DEVICE | Site: STERNUM | Status: FUNCTIONAL

## 2024-02-20 DEVICE — KT PLT STERNALOCK/XP AXILR SHRP: Type: IMPLANTABLE DEVICE | Site: STERNUM | Status: FUNCTIONAL

## 2024-02-20 DEVICE — MARKR GRFT RADIOMARK DISK RO SIL: Type: IMPLANTABLE DEVICE | Site: HEART | Status: FUNCTIONAL

## 2024-02-20 DEVICE — IMPLANTABLE DEVICE: Type: IMPLANTABLE DEVICE | Site: CHEST | Status: FUNCTIONAL

## 2024-02-20 DEVICE — KT HEMOST ABS SURGIFOAM PORCN 1GRAM: Type: IMPLANTABLE DEVICE | Site: CHEST | Status: FUNCTIONAL

## 2024-02-20 DEVICE — IMPLANTABLE DEVICE: Type: IMPLANTABLE DEVICE | Site: HEART | Status: FUNCTIONAL

## 2024-02-20 RX ORDER — CALCIUM CHLORIDE 100 MG/ML
INJECTION INTRAVENOUS; INTRAVENTRICULAR AS NEEDED
Status: DISCONTINUED | OUTPATIENT
Start: 2024-02-20 | End: 2024-02-20 | Stop reason: SURG

## 2024-02-20 RX ORDER — SODIUM CHLORIDE 0.9 % (FLUSH) 0.9 %
3 SYRINGE (ML) INJECTION EVERY 12 HOURS SCHEDULED
Status: DISCONTINUED | OUTPATIENT
Start: 2024-02-20 | End: 2024-02-20 | Stop reason: HOSPADM

## 2024-02-20 RX ORDER — DORZOLAMIDE HYDROCHLORIDE AND TIMOLOL MALEATE 20; 5 MG/ML; MG/ML
SOLUTION/ DROPS OPHTHALMIC DAILY
Status: DISCONTINUED | OUTPATIENT
Start: 2024-02-20 | End: 2024-02-25 | Stop reason: HOSPADM

## 2024-02-20 RX ORDER — ACETAMINOPHEN 650 MG/1
650 SUPPOSITORY RECTAL EVERY 4 HOURS PRN
Status: DISCONTINUED | OUTPATIENT
Start: 2024-02-21 | End: 2024-02-20

## 2024-02-20 RX ORDER — MAGNESIUM HYDROXIDE 1200 MG/15ML
LIQUID ORAL AS NEEDED
Status: DISCONTINUED | OUTPATIENT
Start: 2024-02-20 | End: 2024-02-20 | Stop reason: HOSPADM

## 2024-02-20 RX ORDER — ASPIRIN 81 MG/1
81 TABLET ORAL DAILY
Status: DISCONTINUED | OUTPATIENT
Start: 2024-02-22 | End: 2024-02-25 | Stop reason: HOSPADM

## 2024-02-20 RX ORDER — ACETAMINOPHEN 325 MG/1
650 TABLET ORAL EVERY 6 HOURS SCHEDULED
Status: DISCONTINUED | OUTPATIENT
Start: 2024-02-21 | End: 2024-02-20

## 2024-02-20 RX ORDER — SODIUM CHLORIDE 0.9 % (FLUSH) 0.9 %
3-10 SYRINGE (ML) INJECTION AS NEEDED
Status: DISCONTINUED | OUTPATIENT
Start: 2024-02-20 | End: 2024-02-20 | Stop reason: HOSPADM

## 2024-02-20 RX ORDER — MEPERIDINE HYDROCHLORIDE 50 MG/ML
25 INJECTION INTRAMUSCULAR; INTRAVENOUS; SUBCUTANEOUS
Status: DISCONTINUED | OUTPATIENT
Start: 2024-02-20 | End: 2024-02-21

## 2024-02-20 RX ORDER — NITROGLYCERIN 20 MG/100ML
5 INJECTION INTRAVENOUS CONTINUOUS
Status: DISCONTINUED | OUTPATIENT
Start: 2024-02-20 | End: 2024-02-21

## 2024-02-20 RX ORDER — NOREPINEPHRINE BITARTRATE 0.03 MG/ML
INJECTION, SOLUTION INTRAVENOUS CONTINUOUS PRN
Status: DISCONTINUED | OUTPATIENT
Start: 2024-02-20 | End: 2024-02-20 | Stop reason: SURG

## 2024-02-20 RX ORDER — SODIUM CHLORIDE 9 MG/ML
40 INJECTION, SOLUTION INTRAVENOUS AS NEEDED
Status: DISCONTINUED | OUTPATIENT
Start: 2024-02-20 | End: 2024-02-20 | Stop reason: HOSPADM

## 2024-02-20 RX ORDER — ACETAMINOPHEN 160 MG/5ML
650 SOLUTION ORAL EVERY 4 HOURS PRN
Status: DISCONTINUED | OUTPATIENT
Start: 2024-02-21 | End: 2024-02-20

## 2024-02-20 RX ORDER — SODIUM CHLORIDE, SODIUM LACTATE, POTASSIUM CHLORIDE, CALCIUM CHLORIDE 600; 310; 30; 20 MG/100ML; MG/100ML; MG/100ML; MG/100ML
100 INJECTION, SOLUTION INTRAVENOUS CONTINUOUS
Status: DISCONTINUED | OUTPATIENT
Start: 2024-02-20 | End: 2024-02-20

## 2024-02-20 RX ORDER — NITROGLYCERIN 20 MG/100ML
INJECTION INTRAVENOUS CONTINUOUS PRN
Status: DISCONTINUED | OUTPATIENT
Start: 2024-02-20 | End: 2024-02-20 | Stop reason: SURG

## 2024-02-20 RX ORDER — SODIUM CHLORIDE 0.9 % (FLUSH) 0.9 %
10 SYRINGE (ML) INJECTION AS NEEDED
Status: DISCONTINUED | OUTPATIENT
Start: 2024-02-20 | End: 2024-02-20 | Stop reason: HOSPADM

## 2024-02-20 RX ORDER — POTASSIUM CHLORIDE, DEXTROSE MONOHYDRATE 150; 5 MG/100ML; G/100ML
30 INJECTION, SOLUTION INTRAVENOUS CONTINUOUS
Status: DISCONTINUED | OUTPATIENT
Start: 2024-02-20 | End: 2024-02-21

## 2024-02-20 RX ORDER — ATORVASTATIN CALCIUM 10 MG/1
20 TABLET, FILM COATED ORAL NIGHTLY
Status: DISCONTINUED | OUTPATIENT
Start: 2024-02-21 | End: 2024-02-25 | Stop reason: HOSPADM

## 2024-02-20 RX ORDER — POLYETHYLENE GLYCOL 3350 17 G/17G
17 POWDER, FOR SOLUTION ORAL DAILY
Status: DISCONTINUED | OUTPATIENT
Start: 2024-02-21 | End: 2024-02-25 | Stop reason: HOSPADM

## 2024-02-20 RX ORDER — SODIUM CHLORIDE, SODIUM LACTATE, POTASSIUM CHLORIDE, CALCIUM CHLORIDE 600; 310; 30; 20 MG/100ML; MG/100ML; MG/100ML; MG/100ML
1000 INJECTION, SOLUTION INTRAVENOUS CONTINUOUS
Status: DISCONTINUED | OUTPATIENT
Start: 2024-02-20 | End: 2024-02-20

## 2024-02-20 RX ORDER — NITROGLYCERIN 0.4 MG/1
0.4 TABLET SUBLINGUAL
Status: DISCONTINUED | OUTPATIENT
Start: 2024-02-20 | End: 2024-02-25 | Stop reason: HOSPADM

## 2024-02-20 RX ORDER — NICOTINE POLACRILEX 4 MG
15 LOZENGE BUCCAL
Status: DISCONTINUED | OUTPATIENT
Start: 2024-02-20 | End: 2024-02-21

## 2024-02-20 RX ORDER — ACETAMINOPHEN 325 MG/1
650 TABLET ORAL EVERY 6 HOURS SCHEDULED
Status: DISCONTINUED | OUTPATIENT
Start: 2024-02-20 | End: 2024-02-25 | Stop reason: HOSPADM

## 2024-02-20 RX ORDER — HEPARIN SODIUM 1000 [USP'U]/ML
INJECTION, SOLUTION INTRAVENOUS; SUBCUTANEOUS AS NEEDED
Status: DISCONTINUED | OUTPATIENT
Start: 2024-02-20 | End: 2024-02-20 | Stop reason: SURG

## 2024-02-20 RX ORDER — CLOPIDOGREL BISULFATE 75 MG/1
75 TABLET ORAL DAILY
Status: DISCONTINUED | OUTPATIENT
Start: 2024-02-21 | End: 2024-02-25 | Stop reason: HOSPADM

## 2024-02-20 RX ORDER — OXYCODONE HYDROCHLORIDE 5 MG/1
10 TABLET ORAL EVERY 4 HOURS PRN
Status: DISCONTINUED | OUTPATIENT
Start: 2024-02-20 | End: 2024-02-21

## 2024-02-20 RX ORDER — DEXTROSE MONOHYDRATE 25 G/50ML
10-50 INJECTION, SOLUTION INTRAVENOUS
Status: DISCONTINUED | OUTPATIENT
Start: 2024-02-20 | End: 2024-02-21

## 2024-02-20 RX ORDER — SODIUM CHLORIDE 9 MG/ML
INJECTION, SOLUTION INTRAVENOUS CONTINUOUS PRN
Status: DISCONTINUED | OUTPATIENT
Start: 2024-02-20 | End: 2024-02-20 | Stop reason: SURG

## 2024-02-20 RX ORDER — ACETAMINOPHEN 500 MG
1000 TABLET ORAL ONCE
Status: COMPLETED | OUTPATIENT
Start: 2024-02-20 | End: 2024-02-20

## 2024-02-20 RX ORDER — DEXTROSE MONOHYDRATE 25 G/50ML
10-50 INJECTION, SOLUTION INTRAVENOUS
Status: DISCONTINUED | OUTPATIENT
Start: 2024-02-20 | End: 2024-02-20 | Stop reason: HOSPADM

## 2024-02-20 RX ORDER — LIDOCAINE HYDROCHLORIDE 20 MG/ML
INJECTION, SOLUTION EPIDURAL; INFILTRATION; INTRACAUDAL; PERINEURAL AS NEEDED
Status: DISCONTINUED | OUTPATIENT
Start: 2024-02-20 | End: 2024-02-20 | Stop reason: SURG

## 2024-02-20 RX ORDER — CHLORHEXIDINE GLUCONATE ORAL RINSE 1.2 MG/ML
15 SOLUTION DENTAL EVERY 12 HOURS
Status: DISCONTINUED | OUTPATIENT
Start: 2024-02-20 | End: 2024-02-22

## 2024-02-20 RX ORDER — METOPROLOL TARTRATE 1 MG/ML
INJECTION, SOLUTION INTRAVENOUS AS NEEDED
Status: DISCONTINUED | OUTPATIENT
Start: 2024-02-20 | End: 2024-02-20 | Stop reason: SURG

## 2024-02-20 RX ORDER — LATANOPROST 50 UG/ML
1 SOLUTION/ DROPS OPHTHALMIC NIGHTLY
Status: DISCONTINUED | OUTPATIENT
Start: 2024-02-20 | End: 2024-02-25 | Stop reason: HOSPADM

## 2024-02-20 RX ORDER — ACETAMINOPHEN 10 MG/ML
1000 INJECTION, SOLUTION INTRAVENOUS EVERY 8 HOURS
Qty: 200 ML | Refills: 0 | Status: DISCONTINUED | OUTPATIENT
Start: 2024-02-20 | End: 2024-02-20

## 2024-02-20 RX ORDER — BUPIVACAINE HCL/0.9 % NACL/PF 0.125 %
PLASTIC BAG, INJECTION (ML) EPIDURAL AS NEEDED
Status: DISCONTINUED | OUTPATIENT
Start: 2024-02-20 | End: 2024-02-20 | Stop reason: SURG

## 2024-02-20 RX ORDER — CHLORHEXIDINE GLUCONATE 500 MG/1
1 CLOTH TOPICAL EVERY 24 HOURS
Status: DISCONTINUED | OUTPATIENT
Start: 2024-02-21 | End: 2024-02-21

## 2024-02-20 RX ORDER — CHLORHEXIDINE GLUCONATE ORAL RINSE 1.2 MG/ML
15 SOLUTION DENTAL EVERY 12 HOURS SCHEDULED
Status: DISCONTINUED | OUTPATIENT
Start: 2024-02-20 | End: 2024-02-20 | Stop reason: SDUPTHER

## 2024-02-20 RX ORDER — MIDAZOLAM HYDROCHLORIDE 1 MG/ML
INJECTION INTRAMUSCULAR; INTRAVENOUS AS NEEDED
Status: DISCONTINUED | OUTPATIENT
Start: 2024-02-20 | End: 2024-02-20 | Stop reason: SURG

## 2024-02-20 RX ORDER — DEXMEDETOMIDINE HYDROCHLORIDE 4 UG/ML
.2-1.5 INJECTION, SOLUTION INTRAVENOUS
Status: DISCONTINUED | OUTPATIENT
Start: 2024-02-20 | End: 2024-02-21

## 2024-02-20 RX ORDER — ENOXAPARIN SODIUM 100 MG/ML
40 INJECTION SUBCUTANEOUS DAILY
Status: DISCONTINUED | OUTPATIENT
Start: 2024-02-21 | End: 2024-02-25 | Stop reason: HOSPADM

## 2024-02-20 RX ORDER — IPRATROPIUM BROMIDE AND ALBUTEROL SULFATE 2.5; .5 MG/3ML; MG/3ML
3 SOLUTION RESPIRATORY (INHALATION)
Status: DISCONTINUED | OUTPATIENT
Start: 2024-02-20 | End: 2024-02-21

## 2024-02-20 RX ORDER — NOREPINEPHRINE BITARTRATE 0.03 MG/ML
.02-.3 INJECTION, SOLUTION INTRAVENOUS CONTINUOUS PRN
Status: DISCONTINUED | OUTPATIENT
Start: 2024-02-20 | End: 2024-02-21

## 2024-02-20 RX ORDER — SODIUM CHLORIDE 0.9 % (FLUSH) 0.9 %
30 SYRINGE (ML) INJECTION ONCE AS NEEDED
Status: DISCONTINUED | OUTPATIENT
Start: 2024-02-20 | End: 2024-02-20 | Stop reason: HOSPADM

## 2024-02-20 RX ORDER — NICOTINE POLACRILEX 4 MG
15 LOZENGE BUCCAL
Status: DISCONTINUED | OUTPATIENT
Start: 2024-02-20 | End: 2024-02-20 | Stop reason: HOSPADM

## 2024-02-20 RX ORDER — ALBUTEROL SULFATE 2.5 MG/3ML
2.5 SOLUTION RESPIRATORY (INHALATION) EVERY 4 HOURS PRN
Status: DISCONTINUED | OUTPATIENT
Start: 2024-02-20 | End: 2024-02-21

## 2024-02-20 RX ORDER — MORPHINE SULFATE 2 MG/ML
2 INJECTION, SOLUTION INTRAMUSCULAR; INTRAVENOUS
Status: DISCONTINUED | OUTPATIENT
Start: 2024-02-20 | End: 2024-02-21

## 2024-02-20 RX ORDER — PROTAMINE SULFATE 10 MG/ML
INJECTION, SOLUTION INTRAVENOUS AS NEEDED
Status: DISCONTINUED | OUTPATIENT
Start: 2024-02-20 | End: 2024-02-20 | Stop reason: SURG

## 2024-02-20 RX ORDER — ROCURONIUM BROMIDE 10 MG/ML
INJECTION, SOLUTION INTRAVENOUS AS NEEDED
Status: DISCONTINUED | OUTPATIENT
Start: 2024-02-20 | End: 2024-02-20 | Stop reason: SURG

## 2024-02-20 RX ORDER — SUFENTANIL CITRATE 50 UG/ML
INJECTION EPIDURAL; INTRAVENOUS AS NEEDED
Status: DISCONTINUED | OUTPATIENT
Start: 2024-02-20 | End: 2024-02-20 | Stop reason: SURG

## 2024-02-20 RX ORDER — SODIUM CHLORIDE, SODIUM LACTATE, POTASSIUM CHLORIDE, CALCIUM CHLORIDE 600; 310; 30; 20 MG/100ML; MG/100ML; MG/100ML; MG/100ML
INJECTION, SOLUTION INTRAVENOUS CONTINUOUS PRN
Status: DISCONTINUED | OUTPATIENT
Start: 2024-02-20 | End: 2024-02-20 | Stop reason: SURG

## 2024-02-20 RX ORDER — LIDOCAINE 50 MG/G
2 PATCH TOPICAL
Status: COMPLETED | OUTPATIENT
Start: 2024-02-20 | End: 2024-02-23

## 2024-02-20 RX ORDER — SODIUM CHLORIDE 9 MG/ML
30 INJECTION, SOLUTION INTRAVENOUS CONTINUOUS PRN
Status: DISCONTINUED | OUTPATIENT
Start: 2024-02-20 | End: 2024-02-20 | Stop reason: HOSPADM

## 2024-02-20 RX ORDER — ACETAMINOPHEN 325 MG/1
650 TABLET ORAL EVERY 4 HOURS PRN
Status: DISCONTINUED | OUTPATIENT
Start: 2024-02-21 | End: 2024-02-20

## 2024-02-20 RX ORDER — BISACODYL 5 MG/1
10 TABLET, DELAYED RELEASE ORAL 2 TIMES DAILY
Status: DISCONTINUED | OUTPATIENT
Start: 2024-02-21 | End: 2024-02-25 | Stop reason: HOSPADM

## 2024-02-20 RX ORDER — ASPIRIN 81 MG/1
162 TABLET, CHEWABLE ORAL ONCE
Status: COMPLETED | OUTPATIENT
Start: 2024-02-21 | End: 2024-02-21

## 2024-02-20 RX ORDER — IBUPROFEN 600 MG/1
1 TABLET ORAL
Status: DISCONTINUED | OUTPATIENT
Start: 2024-02-20 | End: 2024-02-20 | Stop reason: HOSPADM

## 2024-02-20 RX ORDER — SODIUM CHLORIDE 0.9 % (FLUSH) 0.9 %
10 SYRINGE (ML) INJECTION EVERY 12 HOURS SCHEDULED
Status: DISCONTINUED | OUTPATIENT
Start: 2024-02-20 | End: 2024-02-20 | Stop reason: HOSPADM

## 2024-02-20 RX ORDER — ONDANSETRON 2 MG/ML
4 INJECTION INTRAMUSCULAR; INTRAVENOUS EVERY 6 HOURS PRN
Status: DISCONTINUED | OUTPATIENT
Start: 2024-02-20 | End: 2024-02-25 | Stop reason: HOSPADM

## 2024-02-20 RX ORDER — ALBUMIN, HUMAN INJ 5% 5 %
500 SOLUTION INTRAVENOUS AS NEEDED
Status: DISCONTINUED | OUTPATIENT
Start: 2024-02-20 | End: 2024-02-21

## 2024-02-20 RX ORDER — MIDAZOLAM HYDROCHLORIDE 1 MG/ML
2 INJECTION INTRAMUSCULAR; INTRAVENOUS
Status: DISCONTINUED | OUTPATIENT
Start: 2024-02-20 | End: 2024-02-20 | Stop reason: HOSPADM

## 2024-02-20 RX ORDER — OXYCODONE HYDROCHLORIDE 5 MG/1
5 TABLET ORAL EVERY 4 HOURS PRN
Status: DISCONTINUED | OUTPATIENT
Start: 2024-02-20 | End: 2024-02-25 | Stop reason: HOSPADM

## 2024-02-20 RX ORDER — DEXMEDETOMIDINE HYDROCHLORIDE 4 UG/ML
INJECTION, SOLUTION INTRAVENOUS CONTINUOUS PRN
Status: DISCONTINUED | OUTPATIENT
Start: 2024-02-20 | End: 2024-02-20 | Stop reason: SURG

## 2024-02-20 RX ADMIN — Medication 200 MCG: at 08:07

## 2024-02-20 RX ADMIN — SUFENTANIL CITRATE 25 MCG: 50 INJECTION EPIDURAL; INTRAVENOUS at 11:15

## 2024-02-20 RX ADMIN — IPRATROPIUM BROMIDE AND ALBUTEROL SULFATE 3 ML: .5; 3 SOLUTION RESPIRATORY (INHALATION) at 19:21

## 2024-02-20 RX ADMIN — NOREPINEPHRINE BITARTRATE 0.02 MCG/KG/MIN: 0.03 INJECTION, SOLUTION INTRAVENOUS at 08:55

## 2024-02-20 RX ADMIN — LATANOPROST 1 DROP: 50 SOLUTION/ DROPS OPHTHALMIC at 21:16

## 2024-02-20 RX ADMIN — CALCIUM CHLORIDE 1 G: 100 INJECTION INTRAVENOUS; INTRAVENTRICULAR at 11:07

## 2024-02-20 RX ADMIN — ACETAMINOPHEN 1000 MG: 500 TABLET ORAL at 05:51

## 2024-02-20 RX ADMIN — SODIUM CHLORIDE: 9 INJECTION, SOLUTION INTRAVENOUS at 07:32

## 2024-02-20 RX ADMIN — MIDAZOLAM HYDROCHLORIDE 2 MG: 1 INJECTION, SOLUTION INTRAMUSCULAR; INTRAVENOUS at 06:48

## 2024-02-20 RX ADMIN — SODIUM CHLORIDE, POTASSIUM CHLORIDE, SODIUM LACTATE AND CALCIUM CHLORIDE 100 ML/HR: 600; 310; 30; 20 INJECTION, SOLUTION INTRAVENOUS at 06:54

## 2024-02-20 RX ADMIN — HEPARIN SODIUM 5000 UNITS: 1000 INJECTION, SOLUTION INTRAVENOUS; SUBCUTANEOUS at 09:00

## 2024-02-20 RX ADMIN — NITROGLYCERIN 10 MCG/MIN: 20 INJECTION INTRAVENOUS at 12:08

## 2024-02-20 RX ADMIN — Medication 50 MCG: at 08:39

## 2024-02-20 RX ADMIN — DEXMEDETOMIDINE HYDROCHLORIDE 1 MCG/KG/HR: 4 INJECTION, SOLUTION INTRAVENOUS at 12:55

## 2024-02-20 RX ADMIN — Medication 100 MCG: at 07:45

## 2024-02-20 RX ADMIN — AMINOCAPROIC ACID 5 G: 250 INJECTION, SOLUTION INTRAVENOUS at 07:32

## 2024-02-20 RX ADMIN — ROCURONIUM BROMIDE 30 MG: 10 INJECTION, SOLUTION INTRAVENOUS at 09:25

## 2024-02-20 RX ADMIN — SUFENTANIL CITRATE 25 MCG: 50 INJECTION EPIDURAL; INTRAVENOUS at 10:42

## 2024-02-20 RX ADMIN — MORPHINE SULFATE 2 MG: 2 INJECTION, SOLUTION INTRAMUSCULAR; INTRAVENOUS at 14:42

## 2024-02-20 RX ADMIN — SUGAMMADEX 200 MG: 100 INJECTION, SOLUTION INTRAVENOUS at 12:31

## 2024-02-20 RX ADMIN — LIDOCAINE 2 PATCH: 700 PATCH TOPICAL at 22:32

## 2024-02-20 RX ADMIN — DEXMEDETOMIDINE HYDROCHLORIDE 1 MCG/KG/HR: 4 INJECTION, SOLUTION INTRAVENOUS at 13:22

## 2024-02-20 RX ADMIN — SODIUM CHLORIDE, POTASSIUM CHLORIDE, SODIUM LACTATE AND CALCIUM CHLORIDE 1000 ML: 600; 310; 30; 20 INJECTION, SOLUTION INTRAVENOUS at 06:54

## 2024-02-20 RX ADMIN — MIDAZOLAM HYDROCHLORIDE 2 MG: 1 INJECTION, SOLUTION INTRAMUSCULAR; INTRAVENOUS at 08:12

## 2024-02-20 RX ADMIN — PROTAMINE SULFATE 250 MG: 10 INJECTION, SOLUTION INTRAVENOUS at 11:10

## 2024-02-20 RX ADMIN — ACETAMINOPHEN 650 MG: 325 TABLET, FILM COATED ORAL at 22:32

## 2024-02-20 RX ADMIN — MIDAZOLAM HYDROCHLORIDE 3 MG: 1 INJECTION, SOLUTION INTRAMUSCULAR; INTRAVENOUS at 07:14

## 2024-02-20 RX ADMIN — ROCURONIUM BROMIDE 100 MG: 10 INJECTION, SOLUTION INTRAVENOUS at 07:14

## 2024-02-20 RX ADMIN — SUFENTANIL CITRATE 50 MCG: 50 INJECTION EPIDURAL; INTRAVENOUS at 11:16

## 2024-02-20 RX ADMIN — SUFENTANIL CITRATE 25 MCG: 50 INJECTION EPIDURAL; INTRAVENOUS at 08:18

## 2024-02-20 RX ADMIN — Medication 200 MCG: at 07:59

## 2024-02-20 RX ADMIN — Medication 100 MCG: at 07:49

## 2024-02-20 RX ADMIN — IPRATROPIUM BROMIDE AND ALBUTEROL SULFATE 3 ML: .5; 3 SOLUTION RESPIRATORY (INHALATION) at 14:32

## 2024-02-20 RX ADMIN — SODIUM CHLORIDE, POTASSIUM CHLORIDE, SODIUM LACTATE AND CALCIUM CHLORIDE: 600; 310; 30; 20 INJECTION, SOLUTION INTRAVENOUS at 07:32

## 2024-02-20 RX ADMIN — Medication 1 APPLICATION: at 05:51

## 2024-02-20 RX ADMIN — OXYCODONE HYDROCHLORIDE 10 MG: 5 TABLET ORAL at 21:18

## 2024-02-20 RX ADMIN — ACETAMINOPHEN 1000 MG: 10 INJECTION, SOLUTION INTRAVENOUS at 14:33

## 2024-02-20 RX ADMIN — METOPROLOL TARTRATE 1 MG: 5 INJECTION INTRAVENOUS at 08:22

## 2024-02-20 RX ADMIN — Medication 200 MCG: at 07:30

## 2024-02-20 RX ADMIN — CHLORHEXIDINE GLUCONATE 15 ML: 1.2 SOLUTION ORAL at 18:07

## 2024-02-20 RX ADMIN — CEFAZOLIN 1000 MG: 2 INJECTION, POWDER, FOR SOLUTION INTRAMUSCULAR; INTRAVENOUS at 12:01

## 2024-02-20 RX ADMIN — TIMOLOL MALEATE: 5 SOLUTION/ DROPS OPHTHALMIC at 13:21

## 2024-02-20 RX ADMIN — Medication 1 APPLICATION: at 18:07

## 2024-02-20 RX ADMIN — POTASSIUM CHLORIDE AND DEXTROSE MONOHYDRATE 30 ML/HR: 150; 5 INJECTION, SOLUTION INTRAVENOUS at 14:29

## 2024-02-20 RX ADMIN — ALBUMIN (HUMAN) 500 ML: 12.5 INJECTION, SOLUTION INTRAVENOUS at 18:48

## 2024-02-20 RX ADMIN — SUFENTANIL CITRATE 100 MCG: 50 INJECTION EPIDURAL; INTRAVENOUS at 07:14

## 2024-02-20 RX ADMIN — DEXTROSE MONOHYDRATE 12.5 G: 500 INJECTION PARENTERAL at 11:13

## 2024-02-20 RX ADMIN — CEFAZOLIN 2 G: 2 INJECTION, POWDER, FOR SOLUTION INTRAMUSCULAR; INTRAVENOUS at 19:32

## 2024-02-20 RX ADMIN — Medication 100 MCG: at 08:23

## 2024-02-20 RX ADMIN — SODIUM CHLORIDE 10 UNITS: 9 INJECTION, SOLUTION INTRAVENOUS at 11:12

## 2024-02-20 RX ADMIN — ALBUMIN (HUMAN) 500 ML: 12.5 INJECTION, SOLUTION INTRAVENOUS at 13:19

## 2024-02-20 RX ADMIN — HEPARIN SODIUM 40000 UNITS: 1000 INJECTION, SOLUTION INTRAVENOUS; SUBCUTANEOUS at 09:16

## 2024-02-20 RX ADMIN — Medication 50 MCG: at 08:31

## 2024-02-20 RX ADMIN — DEXMEDETOMIDINE HYDROCHLORIDE 1 MCG/KG/HR: 4 INJECTION, SOLUTION INTRAVENOUS at 15:57

## 2024-02-20 RX ADMIN — LIDOCAINE HYDROCHLORIDE 100 MG: 20 INJECTION, SOLUTION EPIDURAL; INFILTRATION; INTRACAUDAL; PERINEURAL at 07:14

## 2024-02-20 RX ADMIN — NITROGLYCERIN 5 MCG/MIN: 20 INJECTION INTRAVENOUS at 14:29

## 2024-02-20 RX ADMIN — SODIUM CHLORIDE 1.8 UNITS/HR: 9 INJECTION, SOLUTION INTRAVENOUS at 16:24

## 2024-02-20 RX ADMIN — SUFENTANIL CITRATE 25 MCG: 50 INJECTION EPIDURAL; INTRAVENOUS at 08:14

## 2024-02-20 RX ADMIN — CEFAZOLIN 2000 MG: 2 INJECTION, POWDER, FOR SOLUTION INTRAMUSCULAR; INTRAVENOUS at 08:01

## 2024-02-20 RX ADMIN — DEXMEDETOMIDINE HYDROCHLORIDE 0.5 MCG/KG/HR: 4 INJECTION, SOLUTION INTRAVENOUS at 08:38

## 2024-02-20 NOTE — ANESTHESIA PROCEDURE NOTES
Intra-Op Anesthesia JACKELYN    Procedure Performed: Intra-Op Anesthesia JACKELYN       Start Time:  2/20/2024 7:33 AM       End Time:      Preanesthesia Checklist:  Patient identified, IV assessed, risks and benefits discussed, monitors and equipment assessed, procedure being performed at surgeon's request and anesthesia consent obtained.    General Procedure Information  JACKELYN Placed for monitoring purposes only -- This is not a diagnostic JACKELYN  Diagnostic Indications for Echo:  hemodynamic monitoring  Physician Requesting Echo: Jose Luis Christine MD  Location performed:  OR  Intubated  Bite block not placed  Heart visualized  Probe Insertion:  Easy  Probe Type:  Multiplane  Modalities:  2D only, color flow mapping and continuous wave Doppler        Anesthesia Information  Performed Personally  Anesthesiologist:  Ligia Alonzo MD      Echocardiogram Comments:       JACKELYN placed at surgeon request.     Please see cardiology diagnostic evaluation for complete report.

## 2024-02-20 NOTE — ANESTHESIA PROCEDURE NOTES
Central Line      Patient reassessed immediately prior to procedure    Patient location during procedure: OR  Start time: 2/20/2024 7:22 AM  Indications: vascular access, MD/Surgeon request and central pressure monitoring  Staff  Anesthesiologist: Ligia Alonzo MD  Preanesthetic Checklist  Completed: patient identified, IV checked, site marked, risks and benefits discussed, surgical consent, monitors and equipment checked, pre-op evaluation and timeout performed  Central Line Prep  Sterile Tech:cap, gloves, gown, mask and sterile barriers  Prep: chloraprep  Patient monitoring: blood pressure monitoring, continuous pulse oximetry and EKG  Central Line Procedure  Laterality:right  Location:internal jugular  Catheter Type:MAC and Bon Wier-Fiorella  Catheter Size:9 Fr  Guidance:ultrasound guided and wire visualized in collapsible vessel prior to dilation  PROCEDURE NOTE/ULTRASOUND INTERPRETATION.  Using ultrasound guidance the potential vascular sites for insertion of the catheter were visualized to determine the patency of the vessel to be used for vascular access.  After selecting the appropriate site for insertion, the needle was visualized under ultrasound being inserted into the internal jugular vein, followed by ultrasound confirmation of wire and catheter placement. There were no abnormalities seen on ultrasound; an image was taken; and the patient tolerated the procedure with no complications.   Assessment  Post procedure:biopatch applied, line sutured and occlusive dressing applied  Assessement:blood return through all ports and chest x-ray ordered  Complications:no  Patient Tolerance:patient tolerated the procedure well with no apparent complications  Additional Notes  Bon Wier secured at 53 cm

## 2024-02-20 NOTE — ANESTHESIA POSTPROCEDURE EVALUATION
Patient: Atilio Skelton    Procedure Summary       Date: 02/20/24 Room / Location:  [facility] OR  /  [facility] OR    Anesthesia Start: 0704 Anesthesia Stop: 1238    Procedure: CORONARY ARTERY BYPASS GRAFTING X4, BILATERAL INTERNAL MAMMARY ARTERY GRAFT, LEFT LEG ENDOSCOPIC VEIN HARVEST, TRANSESOPHAGEAL ECHOCARDIOGRAM, XP STERNAL PLATING (Chest) Diagnosis:       Coronary artery disease involving native coronary artery of native heart with other form of angina pectoris      (Coronary artery disease involving native coronary artery of native heart with other form of angina pectoris [I25.118])    Surgeons: Jose Luis Christine MD Provider: Aure Pfeiffer CRNA    Anesthesia Type: general, [place], CVL ASA Status: 4            Anesthesia Type: general, [place], CVL    Vitals  Vitals Value Taken Time   BP 94/59 02/20/24 1231   Temp     Pulse 69 02/20/24 1238   Resp     SpO2 96 % 02/20/24 1238   Vitals shown include unfiled device data.        Post Anesthesia Care and Evaluation    Patient location during evaluation: ICU  Patient participation: complete - patient cannot participate  Post-procedure mental status: sedated.  Pain management: adequate    Airway patency: intubated.  Anesthetic complications: No anesthetic complications  PONV Status: NA  Cardiovascular status: acceptable  Respiratory status: intubated, ETT and ventilator  Hydration status: acceptable    Comments: Pt transported to unit on monitor, intubated (8.0 ETT 21 at the teeth) airway handed over to RT at bedside. Report given to JENNIFER Looney. Pt on Precedex 1 mcg/kg/hr & amicar 1g/hr. NTG stopped upon arrival to unit. Sugammadex (200 mg) given. RN & surgeon aware. Pt a-paced at 80. D/c'ed on arrival per surgeon. VSS- 116/76 (a-line), HR 72, SpO2- 97%. NAD noted. Questions/concerns addressed before leaving unit. Pt stable at this time.   No anesthesia care post op

## 2024-02-20 NOTE — ANESTHESIA PROCEDURE NOTES
Airway  Date/Time: 2/20/2024 7:18 AM  Airway not difficult    General Information and Staff    Patient location during procedure: OR  CRNA/CAA: Aure Pfeiffer CRNA    Indications and Patient Condition  Indications for airway management: airway protection    Preoxygenated: yes  Mask difficulty assessment: 2 - vent by mask + OA or adjuvant +/- NMBA    Final Airway Details  Final airway type: endotracheal airway      Successful airway: ETT  Cuffed: yes   Successful intubation technique: direct laryngoscopy  Facilitating devices/methods: intubating stylet  Endotracheal tube insertion site: oral  Blade: Madie  Blade size: 4  ETT size (mm): 8.0  Cormack-Lehane Classification: grade IIa - partial view of glottis  Placement verified by: chest auscultation   Cuff volume (mL): 8  Measured from: teeth  ETT/EBT  to teeth (cm): 21  Number of attempts at approach: 1  Assessment: lips, teeth, and gum same as pre-op and atraumatic intubation

## 2024-02-20 NOTE — OP NOTE
Cardiac Surgery Operative Note     Date of Procedure: 2/20/2024     Preoperative Diagnosis:   Coronary artery disease involving native coronary artery of native heart with stable angina  Hyperlipidemia  Hypertension  Obese, BMI 33.4     Postoperative Diagnosis:  Same     Procedures Performed:  1. Coronary Artery Bypass, using arterial graft; two arterial grafts, CPT 55803  2. Coronary Artery bypass, using venous grafts and arterial grafts, 2 venous graft, CPT +28236     Procedure Summary: CABG x4 (LIMA to LAD, IRENA to OM2 through transverse sinus, SVG to OM 1/[place], SVG to R-PDA)      Surgeon:  Jose Luis Christine MD  Assistants: Juana Ott CFA  Anesthesia Staff:  Ligia Alonzo MD  Anesthesia Type:  General     Estimated Blood Loss:  Minimal (Cell Saver)     Drains:  1. 24 [place] Steven drain-left pleural space  2. 24 [place] Steven drains x2-anterior and posterior mediastinum  3. 24 [place] Steven drain - right pleural space     Specimens:  None     Operative Indications: Mr. Skelton is a 68-year-old male who presented with stable angina and severe multivessel coronary disease.  His coronary angiography showed severe left main disease normal LV function on LV gram with mild to moderate reduction of LV function on echo.  He had severe distal RCA disease as well as severe disease in the circumflex.  He also had a ramus that was large with proximal disease.  With this I discussed with him proceeding with coronary bypass grafting.  He understood the risk and benefits and agreed to proceed.      Operative Findings: Excellent bilateral arterial conduit.  Marginal venous conduit.  The LAD at site of grafting measured 2 mm in size and had mild disease burden, grafted using LIMA.  OM2 measured 2 mm in size at site of grafting and had mild atherosclerotic disease burden and was partially intramyocardial, grafted using IRENA insitu through transverse sinus.  The OM 1 artery measured 2 mm in size at site of grafting, grafted using  SVG.  The R-PDA was grafted using SVG, measured 1.5 mm in size at site of grafting with mild atherosclerotic disease.  Transesophageal echocardiography showed mildly reduced LV dysfunction at the beginning of the case, improved to normal LV function at the end of the case     Total aortic cross-clamp time: 68 minutes  Total cardiac bypass time:  88 minutes     Operative description in detail:  The patient was taken to the operative suite where the patient was placed in a supine position.  Induction of general anesthesia and placement of a single-lumen endotracheal tube was performed without remark.  Appropriate arterial and venous access was established without remark.  A right IJ central venous catheter was placed followed by a [place] pulmonary artery catheter by anesthesia staff.  The patient was then prepped and draped in the usual and sterile surgical fashion.  A timeout was performed.  Perioperative antibiotics were administered.  Beta blocker was given.     A simultaneous team approach was used to harvest both internal mammary arteries as well as the left saphenous vein.  Left SV was harvested using standard endoscopic technique.  Side branches were controlled with a combination of suture and clips.  The leg wounds were hemostatic and were closed in anatomic layers using absorbable suture.     Median sternotomy was performed in standard fashion. Hemostasis was ensured along sternal edges.  A Rultract device was used to expose the posterior sternal table.  The left and right internal mammary arteries were taken down using a standard skeletonized technique with a combination of electrocautery and clips to control all side branches.  At that time, the patient was systemically anticoagulated with IV heparin.  A 24 [place] Steven drain was placed in the left and right pleural space.  After suitable time of circulating heparin, clips were placed doubly onto the mammary arteries distally and they were divided proximal to  the previously placed clips.  Both had excellent arterial inflow.  The mammary artery harvest beds were hemostatic.  The Rultract device was removed from the sterile field and a sternal retractor was used for exposure.         Midline mediastinal fat and thymus were divided and pericardium opened.  A pericardial well was created.  The distal ascending aorta and right atrial appendage were cannulated for cardiopulmonary bypass in standard fashion.  Aortic line was tested and was satisfactory.  A combined cardioplegia/aortic root vent set was secured with a horizontal mattress 4-0 Prolene suture.  A retrograde cardioplegia catheter was inserted the coronary sinus to the free wall the right atrium.  With an appropriate ACT cardiopulmonary bypass was commenced.  I dissected out the LAD for suitable sites for bypass grafting.  The OMs were examined and dissected out for suitable bypass site.  The R-PDA was dissected out for appropriate grafting site.  I made a hole in the right pericardial pleural reflection and tunneled the IRENA in situ through the transverse sinus to the OM 2, easily reached.       With that, I proceeded to apply the aortic cross-clamp and administered cardioplegia utilizing standard cardioplegia.  This was given in an antegrade and retrograde fashion.  I ensured during the entire cardioplegia administration the LV did not distend.  Upon achieving electrical-mechanical arrest, I applied topical hypothermia to the ventricle and total standard dose was administered.  Cardioplegia was given after every anastomosis with combination of graft, retrograde and antegrade cardioplegia.     I then directed my attention to the R-PDA.  Coronary arteriotomy was made and augmented to size with Morris scissors.  The saphenous vein graft was prepared, it was anastomosed in end-to-side fashion using a running 7-0 Prolene suture.  Anastomosis was assessed for hemostasis which was excellent.  It was not tense or torsed.  I  then measured the vein graft with heart full for proximal anastomosis.  Aortotomy was made and augmented to size using a 4 mm punch.  Proximal anastomosis was made in an end-to-side fashion using a running 6-0 Prolene suture.  Hemostasis was excellent and the aortic root was de-aired while tying the suture.    I then directed my attention to the OM 2.  Coronary arteriotomy was made and augmented to size with Morris scissors.  The IRENA in situ was prepared, it was anastomosed in end-to-side fashion using a running 7-0 Prolene suture.  Anastomosis was assessed for hemostasis which was excellent.  It was not tense or torsed.      I then directed my attention to the OM 1.  Coronary arteriotomy was made and augmented to size with Morris scissors.  The saphenous vein graft was prepared, it was anastomosed in end-to-side fashion using a running 7-0 Prolene suture.  Anastomosis was assessed for hemostasis which was excellent.  It was not tense or torsed.  I then measured the vein graft with heart full for proximal anastomosis.  Aortotomy was made and augmented to size using a 4 mm punch.  Proximal anastomosis was made in an end-to-side fashion using a running 6-0 Prolene suture.  Hemostasis was excellent and the aortic root was de-aired while tying the suture.     I directed my attention towards the LAD.  A coronary arteriotomy was made and augmented to size with Morris scissors.  It is as per operative findings.  The LIMA was prepared.  The anastomosis was constructed in an end-to-side orientation with running 7-0 Prolene suture.  The anastomosis was assessed for hemostasis which was excellent. It is without tension or torsion.      With that being accomplished, a terminal hotshot was administered.  The patient was placed in Trendelenburg position.  Upon completion of terminal hotshot and placement of temporary epicardial pacing wires, with the aortic vent on high and pump flows diminished, the aortic cross-clamp was  released.  With that, full support was implemented.  A nonworking beating phase was implemented.    Ventilation restored.  I surveyed each graft and each anastomosis was hemostatic and had excellent lay.  With all in readiness, the heart was allowed to fill and then weaned off cardiopulmonary bypass.  We removed the aortic root vent/cardioplegia cannula set.  Its associated pursestring suture was tied securely and this was reinforced as per matter of routine.       I decannulated the venous line and snared down its associated pursestring suture.  Systemic intravenous protamine was administered.  All associated blood volume was returned to the patient. With continued good hemodynamics, I decannulated the arterial line and tied down its associated pursestring sutures.  Aortic cannulation site was reinforced as per routine.  At this time I tied down the previously snared pursestring suture.  The mediastinum was drained with 24 [place] Steven drains placed anteriorly and posteriorly.  I surveyed the chest and hemostasis was pristine.  The sternum was reapproximated with the sterna-loc XP plating system.  In layers anatomically the soft tissue planes were reapproximated. Instruments, sharps, and sponge counts were reported as correct.       Complications: None     Disposition: Transferred to ICU in stable and guarded condition.     Jose Luis Christine MD  Cardiothoracic Surgeon

## 2024-02-20 NOTE — INTERVAL H&P NOTE
No significant change since clinic visit.  Reviewed testing and no finding to prevent moving forward with CABG.  Discussed again the operative plan and risks and benefits, he understands and agrees to proceed with CABG.    Jose Luis Christine M.D.  Cardiothoracic Surgeon

## 2024-02-20 NOTE — ANESTHESIA PROCEDURE NOTES
Arterial Line      Patient reassessed immediately prior to procedure    Patient location during procedure: pre-op  Start time: 2/20/2024 6:45 AM   Line placed for hemodynamic monitoring, ABGs/Labs/ISTAT and MD/Surgeon request.  Performed By   Anesthesiologist: Ligia Alonzo MD   Preanesthetic Checklist  Completed: patient identified, IV checked, site marked, risks and benefits discussed, surgical consent, monitors and equipment checked, pre-op evaluation and timeout performed  Arterial Line Prep    Sterile Tech: gloves and sterile barriers  Prep: ChloraPrep  Patient monitoring: blood pressure monitoring, continuous pulse oximetry and EKG  Arterial Line Procedure   Laterality:left  Location:  radial artery  Catheter size: 20 G   Guidance: ultrasound guided  PROCEDURE NOTE/ULTRASOUND INTERPRETATION.  Using ultrasound guidance the potential vascular sites for insertion of the catheter were visualized to determine the patency of the vessel to be used for vascular access.  After selecting the appropriate site for insertion, the needle was visualized under ultrasound being inserted into the radial artery, followed by ultrasound confirmation of wire and catheter placement. There were no abnormalities seen on ultrasound; an image was taken; and the patient tolerated the procedure with no complications.   Number of attempts: 1  Successful placement: yes   Post Assessment   Dressing Type: occlusive dressing applied, secured with tape and wrist guard applied.   Complications no  Circ/Move/Sens Assessment: normal and unchanged.   Patient Tolerance: patient tolerated the procedure well with no apparent complications

## 2024-02-21 ENCOUNTER — APPOINTMENT (OUTPATIENT)
Dept: GENERAL RADIOLOGY | Facility: HOSPITAL | Age: 69
DRG: 236 | End: 2024-02-21
Payer: MEDICARE

## 2024-02-21 PROBLEM — Z78.9 NON-SMOKER: Status: ACTIVE | Noted: 2024-02-21

## 2024-02-21 PROBLEM — E66.811 CLASS 1 OBESITY WITH BODY MASS INDEX (BMI) OF 33.0 TO 33.9 IN ADULT: Status: ACTIVE | Noted: 2024-02-21

## 2024-02-21 PROBLEM — E66.9 CLASS 1 OBESITY WITH BODY MASS INDEX (BMI) OF 33.0 TO 33.9 IN ADULT: Status: ACTIVE | Noted: 2024-02-21

## 2024-02-21 PROBLEM — I10 HYPERTENSION: Status: ACTIVE | Noted: 2024-02-21

## 2024-02-21 PROBLEM — E78.5 HYPERLIPIDEMIA: Status: ACTIVE | Noted: 2024-02-21

## 2024-02-21 LAB
ALBUMIN SERPL-MCNC: 4.3 G/DL (ref 3.5–5.2)
ANION GAP SERPL CALCULATED.3IONS-SCNC: 10 MMOL/L (ref 5–15)
BASOPHILS # BLD AUTO: 0.02 10*3/MM3 (ref 0–0.2)
BASOPHILS NFR BLD AUTO: 0.2 % (ref 0–1.5)
BUN SERPL-MCNC: 14 MG/DL (ref 8–23)
BUN/CREAT SERPL: 15.4 (ref 7–25)
CALCIUM SPEC-SCNC: 8.4 MG/DL (ref 8.6–10.5)
CHLORIDE SERPL-SCNC: 106 MMOL/L (ref 98–107)
CO2 SERPL-SCNC: 24 MMOL/L (ref 22–29)
CREAT SERPL-MCNC: 0.91 MG/DL (ref 0.76–1.27)
DEPRECATED RDW RBC AUTO: 45.1 FL (ref 37–54)
EGFRCR SERPLBLD CKD-EPI 2021: 91.8 ML/MIN/1.73
EOSINOPHIL # BLD AUTO: 0 10*3/MM3 (ref 0–0.4)
EOSINOPHIL NFR BLD AUTO: 0 % (ref 0.3–6.2)
ERYTHROCYTE [DISTWIDTH] IN BLOOD BY AUTOMATED COUNT: 13.1 % (ref 12.3–15.4)
GLUCOSE BLDC GLUCOMTR-MCNC: 142 MG/DL (ref 70–130)
GLUCOSE BLDC GLUCOMTR-MCNC: 148 MG/DL (ref 70–130)
GLUCOSE BLDC GLUCOMTR-MCNC: 148 MG/DL (ref 70–130)
GLUCOSE BLDC GLUCOMTR-MCNC: 149 MG/DL (ref 70–130)
GLUCOSE SERPL-MCNC: 156 MG/DL (ref 65–99)
HCT VFR BLD AUTO: 35.6 % (ref 37.5–51)
HGB BLD-MCNC: 11.7 G/DL (ref 13–17.7)
INR PPP: 1.16 (ref 0.91–1.09)
LYMPHOCYTES # BLD AUTO: 0.43 10*3/MM3 (ref 0.7–3.1)
LYMPHOCYTES NFR BLD AUTO: 5 % (ref 19.6–45.3)
MAGNESIUM SERPL-MCNC: 2.1 MG/DL (ref 1.6–2.4)
MCH RBC QN AUTO: 31.4 PG (ref 26.6–33)
MCHC RBC AUTO-ENTMCNC: 32.9 G/DL (ref 31.5–35.7)
MCV RBC AUTO: 95.4 FL (ref 79–97)
MONOCYTES # BLD AUTO: 0.5 10*3/MM3 (ref 0.1–0.9)
MONOCYTES NFR BLD AUTO: 5.9 % (ref 5–12)
NEUTROPHILS NFR BLD AUTO: 7.54 10*3/MM3 (ref 1.7–7)
NEUTROPHILS NFR BLD AUTO: 88.5 % (ref 42.7–76)
PHOSPHATE SERPL-MCNC: 2.9 MG/DL (ref 2.5–4.5)
PLATELET # BLD AUTO: 113 10*3/MM3 (ref 140–450)
PMV BLD AUTO: 10.9 FL (ref 6–12)
POTASSIUM SERPL-SCNC: 4 MMOL/L (ref 3.5–5.2)
PROTHROMBIN TIME: 15 SECONDS (ref 11.8–14.8)
QT INTERVAL: 358 MS
QT INTERVAL: 432 MS
QTC INTERVAL: 452 MS
QTC INTERVAL: 466 MS
RBC # BLD AUTO: 3.73 10*6/MM3 (ref 4.14–5.8)
SODIUM SERPL-SCNC: 140 MMOL/L (ref 136–145)
WBC NRBC COR # BLD AUTO: 8.52 10*3/MM3 (ref 3.4–10.8)

## 2024-02-21 PROCEDURE — 82948 REAGENT STRIP/BLOOD GLUCOSE: CPT

## 2024-02-21 PROCEDURE — 80069 RENAL FUNCTION PANEL: CPT | Performed by: SURGERY

## 2024-02-21 PROCEDURE — 25010000002 CEFAZOLIN PER 500 MG: Performed by: NURSE PRACTITIONER

## 2024-02-21 PROCEDURE — 93010 ELECTROCARDIOGRAM REPORT: CPT | Performed by: EMERGENCY MEDICINE

## 2024-02-21 PROCEDURE — 93005 ELECTROCARDIOGRAM TRACING: CPT | Performed by: SURGERY

## 2024-02-21 PROCEDURE — 85025 COMPLETE CBC W/AUTO DIFF WBC: CPT | Performed by: SURGERY

## 2024-02-21 PROCEDURE — 94799 UNLISTED PULMONARY SVC/PX: CPT

## 2024-02-21 PROCEDURE — 25010000002 ENOXAPARIN PER 10 MG: Performed by: SURGERY

## 2024-02-21 PROCEDURE — 97162 PT EVAL MOD COMPLEX 30 MIN: CPT

## 2024-02-21 PROCEDURE — 71045 X-RAY EXAM CHEST 1 VIEW: CPT

## 2024-02-21 PROCEDURE — 25010000002 CEFAZOLIN PER 500 MG: Performed by: SURGERY

## 2024-02-21 PROCEDURE — 85610 PROTHROMBIN TIME: CPT | Performed by: SURGERY

## 2024-02-21 PROCEDURE — 25010000002 AMIODARONE IN DEXTROSE 5% 360-4.14 MG/200ML-% SOLUTION: Performed by: SURGERY

## 2024-02-21 PROCEDURE — 83735 ASSAY OF MAGNESIUM: CPT | Performed by: SURGERY

## 2024-02-21 PROCEDURE — 94664 DEMO&/EVAL PT USE INHALER: CPT

## 2024-02-21 PROCEDURE — 97116 GAIT TRAINING THERAPY: CPT

## 2024-02-21 RX ORDER — METHOCARBAMOL 500 MG/1
500 TABLET, FILM COATED ORAL EVERY 8 HOURS SCHEDULED
Status: DISCONTINUED | OUTPATIENT
Start: 2024-02-21 | End: 2024-02-25 | Stop reason: HOSPADM

## 2024-02-21 RX ORDER — AMIODARONE HYDROCHLORIDE 200 MG/1
400 TABLET ORAL EVERY 12 HOURS SCHEDULED
Status: DISCONTINUED | OUTPATIENT
Start: 2024-02-21 | End: 2024-02-25 | Stop reason: HOSPADM

## 2024-02-21 RX ORDER — AMIODARONE HYDROCHLORIDE 200 MG/1
200 TABLET ORAL ONCE
Status: DISCONTINUED | OUTPATIENT
Start: 2024-02-22 | End: 2024-02-21

## 2024-02-21 RX ORDER — PANTOPRAZOLE SODIUM 40 MG/1
40 TABLET, DELAYED RELEASE ORAL
Status: DISCONTINUED | OUTPATIENT
Start: 2024-02-21 | End: 2024-02-25 | Stop reason: HOSPADM

## 2024-02-21 RX ORDER — AMIODARONE HYDROCHLORIDE 200 MG/1
400 TABLET ORAL 2 TIMES DAILY WITH MEALS
Status: DISCONTINUED | OUTPATIENT
Start: 2024-02-22 | End: 2024-02-21

## 2024-02-21 RX ADMIN — METHOCARBAMOL 500 MG: 500 TABLET, FILM COATED ORAL at 21:00

## 2024-02-21 RX ADMIN — METOPROLOL TARTRATE 25 MG: 25 TABLET, FILM COATED ORAL at 20:58

## 2024-02-21 RX ADMIN — AMIODARONE HYDROCHLORIDE 0.5 MG/MIN: 1.8 INJECTION, SOLUTION INTRAVENOUS at 13:13

## 2024-02-21 RX ADMIN — OXYCODONE HYDROCHLORIDE 5 MG: 5 TABLET ORAL at 16:55

## 2024-02-21 RX ADMIN — PANTOPRAZOLE SODIUM 40 MG: 40 TABLET, DELAYED RELEASE ORAL at 08:15

## 2024-02-21 RX ADMIN — ACETAMINOPHEN 650 MG: 325 TABLET, FILM COATED ORAL at 17:54

## 2024-02-21 RX ADMIN — METHOCARBAMOL 500 MG: 500 TABLET, FILM COATED ORAL at 13:21

## 2024-02-21 RX ADMIN — ACETAMINOPHEN 650 MG: 325 TABLET, FILM COATED ORAL at 11:38

## 2024-02-21 RX ADMIN — OXYCODONE HYDROCHLORIDE 10 MG: 5 TABLET ORAL at 02:22

## 2024-02-21 RX ADMIN — AMIODARONE HYDROCHLORIDE 400 MG: 200 TABLET ORAL at 20:58

## 2024-02-21 RX ADMIN — OXYCODONE HYDROCHLORIDE 5 MG: 5 TABLET ORAL at 21:08

## 2024-02-21 RX ADMIN — CEFAZOLIN 2 G: 2 INJECTION, POWDER, FOR SOLUTION INTRAMUSCULAR; INTRAVENOUS at 04:28

## 2024-02-21 RX ADMIN — OXYCODONE HYDROCHLORIDE 5 MG: 5 TABLET ORAL at 13:12

## 2024-02-21 RX ADMIN — OXYCODONE HYDROCHLORIDE 5 MG: 5 TABLET ORAL at 08:32

## 2024-02-21 RX ADMIN — CEFAZOLIN 2 G: 2 INJECTION, POWDER, FOR SOLUTION INTRAMUSCULAR; INTRAVENOUS at 11:38

## 2024-02-21 RX ADMIN — BISACODYL 10 MG: 5 TABLET, COATED ORAL at 08:15

## 2024-02-21 RX ADMIN — AMIODARONE HYDROCHLORIDE 400 MG: 200 TABLET ORAL at 08:15

## 2024-02-21 RX ADMIN — CHLORHEXIDINE GLUCONATE 15 ML: 1.2 SOLUTION ORAL at 06:32

## 2024-02-21 RX ADMIN — Medication 1 APPLICATION: at 06:32

## 2024-02-21 RX ADMIN — IPRATROPIUM BROMIDE AND ALBUTEROL SULFATE 3 ML: .5; 3 SOLUTION RESPIRATORY (INHALATION) at 06:35

## 2024-02-21 RX ADMIN — ACETAMINOPHEN 650 MG: 325 TABLET, FILM COATED ORAL at 06:32

## 2024-02-21 RX ADMIN — ENOXAPARIN SODIUM 40 MG: 100 INJECTION SUBCUTANEOUS at 08:15

## 2024-02-21 RX ADMIN — METHOCARBAMOL 500 MG: 500 TABLET, FILM COATED ORAL at 08:15

## 2024-02-21 RX ADMIN — CHLORHEXIDINE GLUCONATE 1 APPLICATION: 500 CLOTH TOPICAL at 04:27

## 2024-02-21 RX ADMIN — METOPROLOL TARTRATE 25 MG: 25 TABLET, FILM COATED ORAL at 08:15

## 2024-02-21 RX ADMIN — CEFAZOLIN 2 G: 2 INJECTION, POWDER, FOR SOLUTION INTRAMUSCULAR; INTRAVENOUS at 20:59

## 2024-02-21 RX ADMIN — TIMOLOL MALEATE: 5 SOLUTION/ DROPS OPHTHALMIC at 08:17

## 2024-02-21 RX ADMIN — BISACODYL 10 MG: 5 TABLET, COATED ORAL at 20:58

## 2024-02-21 RX ADMIN — ACETAMINOPHEN 650 MG: 325 TABLET, FILM COATED ORAL at 23:14

## 2024-02-21 RX ADMIN — Medication 1 APPLICATION: at 17:55

## 2024-02-21 RX ADMIN — CHLORHEXIDINE GLUCONATE 15 ML: 1.2 SOLUTION ORAL at 18:48

## 2024-02-21 RX ADMIN — CLOPIDOGREL BISULFATE 75 MG: 75 TABLET, FILM COATED ORAL at 17:56

## 2024-02-21 RX ADMIN — LATANOPROST 1 DROP: 50 SOLUTION/ DROPS OPHTHALMIC at 21:00

## 2024-02-21 RX ADMIN — POLYETHYLENE GLYCOL 3350 17 G: 17 POWDER, FOR SOLUTION ORAL at 08:16

## 2024-02-21 RX ADMIN — ASPIRIN 162 MG: 81 TABLET, CHEWABLE ORAL at 08:15

## 2024-02-21 RX ADMIN — AMIODARONE HYDROCHLORIDE 1 MG/MIN: 1.8 INJECTION, SOLUTION INTRAVENOUS at 07:36

## 2024-02-21 NOTE — CASE MANAGEMENT/SOCIAL WORK
Discharge Planning Assessment  [facility]     Patient Name: Atilio Skelton  MRN: [id number]  Today's Date: 2/21/2024    Admit Date: 2/20/2024        Discharge Needs Assessment       Row Name 02/21/24 1031       Living Environment    People in Home spouse    Name(s) of People in Home Margaux    Current Living Arrangements home    Primary Care Provided by self    Provides Primary Care For no one    Family Caregiver if Needed spouse    Family Caregiver Names Margaux    Able to Return to Prior Arrangements yes       Resource/Environmental Concerns    Resource/Environmental Concerns none       Transition Planning    Patient/Family Anticipates Transition to home with family    Transportation Anticipated family or friend will provide       Discharge Needs Assessment    Readmission Within the Last 30 Days no previous admission in last 30 days    Equipment Currently Used at Home none    Concerns to be Addressed no discharge needs identified    Equipment Needed After Discharge none    Discharge Coordination/Progress spoke to patient who lives with spouse has been independent at home prior to illness; has RX coverage and PCP; will follow for DC needs                   Discharge Plan    No documentation.                 Continued Care and Services - Admitted Since 2/20/2024    Coordination has not been started for this encounter.          Demographic Summary    No documentation.                  Functional Status    No documentation.                  Psychosocial    No documentation.                  Abuse/Neglect    No documentation.                  Legal    No documentation.                  Substance Abuse    No documentation.                  Patient Forms    No documentation.                     Aster Solano RN

## 2024-02-21 NOTE — PROGRESS NOTES
"Patient name: Atilio Skelton  Patient : 1955  VISIT # [id number]  MR #[id number]    Procedure:Procedure(s):  CORONARY ARTERY BYPASS GRAFTING X4, BILATERAL INTERNAL MAMMARY ARTERY GRAFT, LEFT LEG ENDOSCOPIC VEIN HARVEST, TRANSESOPHAGEAL ECHOCARDIOGRAM, XP STERNAL PLATING  Procedure Date:2024  POD:1 Day Post-Op    Subjective     Extubated overnight and tolerating 2 liters NC.  Hemodynamically stable.  He is in A-fib this morning.  Weight is up 6 pounds from baseline.  Pain is well-controlled.  Due to walk with physical therapy.    Telemetry: Atrial fibrillation 111  IV drips: Nitro, insulin, IV fluids       Objective     Visit Vitals  /58   Pulse 103   Temp 98.9 °F (37.2 °C) (Oral)   Resp 20   Ht 177.5 cm (69.88\")   Wt 108 kg (238 lb 9.6 oz)   SpO2 95%   BMI 34.35 kg/m²   Baseline weight 232 pounds    Intake/Output Summary (Last 24 hours) at 2024 0724  Last data filed at 2024 0500  Gross per 24 hour   Intake 4256.49 ml   Output 3031 ml   Net 1225.49 ml     MCT: 286 mL since OR, serosanguineous, no airleak  Right and left pleural drain: 155 mL since OR, serosanguineous, no airleak    Lab:     CBC:  Results from last 7 days   Lab Units 24  02124  1601 24  1248   WBC 10*3/mm3 8.52 7.35 10.85*   HEMATOCRIT % 35.6* 37.9 37.6   PLATELETS 10*3/mm3 113* 108* 129*          BMP:  Results from last 7 days   Lab Units 24  02124  1601 24  1248   SODIUM mmol/L 140 142 144   POTASSIUM mmol/L 4.0 4.4 4.2   CHLORIDE mmol/L 106 108* 110*   CO2 mmol/L 24.0 23.0 25.0   GLUCOSE mg/dL 156* 169* 131*   BUN mg/dL 14 14 14   CREATININE mg/dL 0.91 0.90 1.01          COAG:  Results from last 7 days   Lab Units 24  0215 24  1248   INR  1.16* 1.26*   APTT seconds  --  37.2*       IMAGES:       Imaging Results (Last 24 Hours)       Procedure Component Value Units Date/Time    XR Chest 1 View [[id number]] Collected: 24     Updated: 24    " Narrative:      EXAMINATION: XR CHEST 1 VW-     2/21/2024 2:20 AM     HISTORY: Post-Op Heart Surgery     A frontal projection of the chest is compared with the previous study  dated 2/22/2024.     The lungs are poorly expanded, significantly worse than the previous  study.     There are atelectatic changes in the lower lung, right more than the  left.     There is moderate elevation of right diaphragm which was not seen in the  previous study.     There is a trace bibasilar pleural effusion.     There is no pulmonary congestion or pneumothorax.     There is moderate cardiomegaly. There is evidence of prior cardiac  surgery.     The endotracheal tube and [place]-Fiorella catheter have been removed since the  previous study.     Right internal jugular vascular sheath is in place. Bilateral chest  tubes are in place. The mediastinal drain is in place.     No acute bony abnormality.       Impression:      1. Poor lung expansion. Atelectatic changes. A trace bibasilar pleural  effusion.  2. Cardiomegaly.  3. Removal of the endotracheal tube and [place]-Fiorella catheter since the  previous study. The remaining tubes and lines are in place.        This report was signed and finalized on 2/21/2024 6:07 AM by Dr. Darling Aguayo MD.       XR Chest 1 View [[id number]] Collected: 02/20/24 1336     Updated: 02/20/24 1342    Narrative:      EXAMINATION: XR CHEST 1 VW-  2/20/2024 1:36 PM 1 view     HISTORY: Post-Op Check Line & Tube Placement; I25.118-Atherosclerotic  heart disease of native coronary artery with other forms of angina  pectoris     COMPARISON: 2/19/2024     TECHNIQUE: A single frontal view of the chest was obtained.     FINDINGS:  Endotracheal tube tip projects 5 cm above the leonarda. Median sternotomy  wires have been placed. [place]-Fiorella catheter noted with the tip of the  catheter projecting over the LEFT pulmonary artery. Mediastinal drains  and chest tubes are noted. No large pneumothorax or large pleural  effusion. There  is bibasilar atelectasis. The osseous structures and  surrounding soft tissues demonstrate no acute abnormality.       Impression:         1.  Interval median sternotomy as discussed above. There is bibasilar  atelectasis.     2.  Tubes and lines as discussed above.           This report was signed and finalized on 2/20/2024 1:39 PM by Dr. Lalo Leblanc MD.             CXR: Chest tubes in stable position with no pneumothorax.  Hypoventilation with bibasilar atelectasis.    Physical Exam:  General: Alert, oriented. No apparent distress.  Obese  Cardiovascular: Regular rate and rhythm without murmur, rubs, or gallops.    Pulmonary: Clear to auscultation bilaterally without wheezing, rubs, or rales.  Chest: Sternotomy incision clean, dry, and intact. Sternum stable. No clicks. Chest tube to 20 cm suction. No air leak. Fluid is serosanguineous.  Abdomen: Soft, nondistended, and nontender.  Extremities: Warm, moves all extremities. No edema. Saphenectomy site clean, dry, and intact with Ace wrap  Neurologic:  Grossly intact with no focal deficits.            Impression:  Coronary artery disease with stable angina  Hyperlipidemia, on statin  Hypertension, well-controlled  Obesity, BMI 33  Non-smoker  Paroxysmal atrial fibrillation        Plan:  Begin bowel regimen  Start amiodarone drip and p.o. amiodarone  Discontinue nitro and insulin drip  Increase beta-blocker  Routine postcardiac surgery care  Encourage pulmonary toilet and ambulation  Keep chest tubes today  Remain in ICU for now  Discussed with patient and nursing      ALESHA Arriaga  02/21/24  07:24 CST

## 2024-02-21 NOTE — PLAN OF CARE
Goal Outcome Evaluation:      Patient stood and sat with CGA. Ambulated 200' with Min assist and 1 standing rest. RA SATs 93 -98%. BP 98/72 - 109/63. HR 78 -73 . Patient will benefit from safety education and increased ambulation.

## 2024-02-21 NOTE — PLAN OF CARE
Problem: Adult Inpatient Plan of Care  Goal: Plan of Care Review  Recent Flowsheet Documentation  Taken 2/21/2024 0855 by Warren Tolentino, PT DPT  Plan of Care Reviewed With: patient  Outcome Evaluation: PT jose complete. He is alert and oriented x 4, spouse in room. They were educated on sternal precautions. He reports being ind his mobility prior to surgery. Today needs min assist x 2 to stand and min x 1-2 to ambulate. Ambulates 200 ft around the unit with 1 standing rest break due to SOB. Demos pain and SOB with activity. HR increased to 135 with gait. PT will cont with mobiilty, balance, activity tolerance and strengthening. Anticipate he will cont to recover and d.c home w spouse.      Anticipated Discharge Disposition (PT): home with assist

## 2024-02-21 NOTE — PLAN OF CARE
Problem: Adult Inpatient Plan of Care  Goal: Absence of Hospital-Acquired Illness or Injury  Intervention: Identify and Manage Fall Risk  Description: Perform standard risk assessment on admission using a validated tool or comprehensive approach appropriate to the patient; reassess fall risk frequently, with change in status or transfer to another level of care.  Communicate fall injury risk to interprofessional healthcare team.  Determine need for increased observation, equipment and environmental modification, such as low bed, signage and supportive, nonskid footwear.  Adjust safety measures to individual developmental age, stage and identified risk factors.  Reinforce the importance of safety and physical activity with patient and family.  Perform regular intentional rounding to assess need for position change, pain assessment and personal needs, including assistance with toileting.  Recent Flowsheet Documentation  Taken 2/21/2024 0600 by Eun Brewer RN  Safety Promotion/Fall Prevention: safety round/check completed  Taken 2/21/2024 0500 by Eun Brewer RN  Safety Promotion/Fall Prevention: safety round/check completed  Taken 2/21/2024 0400 by Eun Brewer RN  Safety Promotion/Fall Prevention: safety round/check completed  Taken 2/21/2024 0300 by Eun Brewer RN  Safety Promotion/Fall Prevention: safety round/check completed  Taken 2/21/2024 0200 by Eun Brewer RN  Safety Promotion/Fall Prevention: safety round/check completed  Taken 2/21/2024 0100 by Eun Brewer RN  Safety Promotion/Fall Prevention: safety round/check completed  Taken 2/21/2024 0000 by Eun Brewer RN  Safety Promotion/Fall Prevention: safety round/check completed  Taken 2/20/2024 2300 by Eun Brewer RN  Safety Promotion/Fall Prevention: safety round/check completed  Taken 2/20/2024 2200 by Eun Brewer RN  Safety Promotion/Fall Prevention: safety round/check completed  Taken  2/20/2024 2105 by Eun Brewer RN  Safety Promotion/Fall Prevention: safety round/check completed  Taken 2/20/2024 2100 by Eun Brewer RN  Safety Promotion/Fall Prevention: safety round/check completed  Taken 2/20/2024 2000 by Eun Brewer RN  Safety Promotion/Fall Prevention: safety round/check completed  Taken 2/20/2024 1900 by Eun Brewer RN  Safety Promotion/Fall Prevention: safety round/check completed  Intervention: Prevent Skin Injury  Description: Perform a screening for skin injury risk, such as pressure or moisture associated skin damage on admission and at regular intervals throughout hospital stay.  Keep all areas of skin (especially folds) clean and dry.  Maintain adequate skin hydration.  Relieve and redistribute pressure and protect bony prominences; implement measures based on patient-specific risk factors.  Match turning and repositioning schedule to clinical condition.  Encourage weight shift frequently; assist with reposition if unable to complete independently.  Float heels off bed; avoid pressure on the Achilles tendon.  Keep skin free from extended contact with medical devices.  Encourage functional activity and mobility, as early as tolerated.  Use aids (e.g., slide boards, mechanical lift) during transfer.  Recent Flowsheet Documentation  Taken 2/21/2024 0600 by Eun Brewer RN  Body Position:   upper extremity elevated   weight shifting  Taken 2/21/2024 0400 by Eun Brewer RN  Body Position:   upper extremity elevated   weight shifting  Skin Protection: adhesive use limited  Taken 2/21/2024 0200 by Eun Brewer RN  Body Position:   upper extremity elevated   weight shifting  Taken 2/21/2024 0000 by Eun Brewer RN  Body Position:   upper extremity elevated   weight shifting  Skin Protection: adhesive use limited  Taken 2/20/2024 2200 by Eun Brewer RN  Body Position:   upper extremity elevated   weight shifting  Taken  2/20/2024 2105 by Eun Brewer RN  Body Position:   upper extremity elevated   weight shifting  Skin Protection: adhesive use limited  Taken 2/20/2024 2000 by Eun Brewer RN  Body Position:   upper extremity elevated   weight shifting  Skin Protection: adhesive use limited  Intervention: Prevent and Manage VTE (Venous Thromboembolism) Risk  Description: Assess for VTE (venous thromboembolism) risk.  Encourage and assist with early ambulation.  Initiate and maintain compression or other therapy, as indicated, based on identified risk in accordance with organizational protocol and provider order.  Encourage both active and passive leg exercises while in bed, if unable to ambulate.  Recent Flowsheet Documentation  Taken 2/21/2024 0600 by Eun Brewer RN  Activity Management: up in chair  Taken 2/21/2024 0400 by Eun Brewer RN  VTE Prevention/Management:   right   sequential compression devices on  Taken 2/21/2024 0000 by Eun Brewer RN  VTE Prevention/Management:   right   sequential compression devices on  Taken 2/20/2024 2105 by Eun Brewer RN  Activity Management: bedrest  VTE Prevention/Management:   right   sequential compression devices on  Taken 2/20/2024 2000 by Eun Brewer RN  Activity Management: bedrest  VTE Prevention/Management:   right   sequential compression devices on  Goal: Optimal Comfort and Wellbeing  Intervention: Provide Person-Centered Care  Description: Use a family-focused approach to care.  Develop trust and rapport by proactively providing information, encouraging questions, addressing concerns and offering reassurance.  Acknowledge emotional response to hospitalization.  Recognize and utilize personal coping strategies.  Honor spiritual and cultural preferences.  Recent Flowsheet Documentation  Taken 2/20/2024 2105 by Eun Brewer RN  Trust Relationship/Rapport:   care explained   questions answered  Taken 2/20/2024 2000 by  Eun Brewer RN  Trust Relationship/Rapport: care explained     Problem: Skin Injury Risk Increased  Goal: Skin Health and Integrity  Intervention: Optimize Skin Protection  Description: Perform a full pressure injury risk assessment, as indicated by screening, upon admission to care unit.  Reassess skin (injury risk, full inspection) frequently (e.g., scheduled interval, with change in condition) to provide optimal early detection and prevention.  Maintain adequate tissue perfusion (e.g., encourage fluid balance; avoid crossing legs, constrictive clothing or devices) to promote tissue oxygenation.  Maintain head of bed at lowest degree of elevation tolerated, considering medical condition and other restrictions.  Avoid positioning onto an area that remains reddened.  Minimize incontinence and moisture (e.g., toileting schedule; moisture-wicking pad, diaper or incontinence collection device; skin moisture barrier).  Cleanse skin promptly and gently when soiled utilizing a pH-balanced cleanser.  Relieve and redistribute pressure (e.g., scheduled position changes, weight shifts, use of support surface, medical device repositioning, protective dressing application, use of positioning device, microclimate control, use of pressure-injury-monitor  Encourage increased activity, such as sitting in a chair at the bedside or early mobilization, when able to tolerate.  Recent Flowsheet Documentation  Taken 2/21/2024 0600 by Eun Brewer RN  Head of Bed (HOB) Positioning: HOB elevated  Taken 2/21/2024 0400 by Eun Brewer RN  Pressure Reduction Techniques: weight shift assistance provided  Head of Bed (HOB) Positioning: HOB at 45 degrees  Pressure Reduction Devices: specialty bed utilized  Skin Protection: adhesive use limited  Taken 2/21/2024 0200 by Eun Brewer RN  Head of Bed (HOB) Positioning: HOB at 45 degrees  Taken 2/21/2024 0000 by Eun Brewer RN  Pressure Reduction Techniques:  weight shift assistance provided  Head of Bed (HOB) Positioning: HOB at 45 degrees  Pressure Reduction Devices: specialty bed utilized  Skin Protection: adhesive use limited  Taken 2/20/2024 2200 by Eun Brewer RN  Head of Bed (HOB) Positioning: HOB at 45 degrees  Taken 2/20/2024 2105 by Eun Brewer RN  Pressure Reduction Techniques: weight shift assistance provided  Head of Bed (HOB) Positioning: HOB at 45 degrees  Pressure Reduction Devices: specialty bed utilized  Skin Protection: adhesive use limited  Taken 2/20/2024 2000 by Eun Brewer RN  Pressure Reduction Techniques: weight shift assistance provided  Head of Bed (HOB) Positioning: HOB at 45 degrees  Pressure Reduction Devices: specialty bed utilized  Skin Protection: adhesive use limited     Problem: Fall Injury Risk  Goal: Absence of Fall and Fall-Related Injury  Intervention: Identify and Manage Contributors  Description: Develop a fall prevention plan with the patient and caregiver/family.  Provide reorientation, appropriate sensory stimulation and routines with changes in mental status to decrease risk of fall.  Promote use of personal vision and auditory aids.  Assess assistance level required for safe and effective self-care; provide support as needed, such as toileting, mobilization. For age 65 and older, implement timed toileting with assistance.  Encourage physical activity, such as performance of mobility and self-care at highest level of patient ability, multicomponent exercise program and provision of appropriate assistive devices.  If fall occurs, assess the severity of injury; implement fall injury protocol. Determine the cause and revise fall injury prevention plan.  Regularly review medication contribution to fall risk; adjust medication administration times to minimize risk of falling.  Consider risk related to polypharmacy and age.  Balance adequate pain management with potential for oversedation.  Recent Flowsheet  Documentation  Taken 2/20/2024 2105 by Eun Brewer RN  Medication Review/Management: medications reviewed  Taken 2/20/2024 2000 by Eun Brewer RN  Medication Review/Management: medications reviewed  Intervention: Promote Injury-Free Environment  Description: Provide a safe, barrier-free environment that encourages independent activity.  Keep care area uncluttered and well-lighted.  Determine need for increased observation or monitoring.  Avoid use of devices that minimize mobility, such as restraints or indwelling urinary catheter.  Recent Flowsheet Documentation  Taken 2/21/2024 0600 by Eun Brewer RN  Safety Promotion/Fall Prevention: safety round/check completed  Taken 2/21/2024 0500 by Eun Brewer RN  Safety Promotion/Fall Prevention: safety round/check completed  Taken 2/21/2024 0400 by Eun Brewer RN  Safety Promotion/Fall Prevention: safety round/check completed  Taken 2/21/2024 0300 by Eun Brewer RN  Safety Promotion/Fall Prevention: safety round/check completed  Taken 2/21/2024 0200 by Eun Brewer RN  Safety Promotion/Fall Prevention: safety round/check completed  Taken 2/21/2024 0100 by Eun Brewer RN  Safety Promotion/Fall Prevention: safety round/check completed  Taken 2/21/2024 0000 by Eun Brewer RN  Safety Promotion/Fall Prevention: safety round/check completed  Taken 2/20/2024 2300 by Eun Brewer RN  Safety Promotion/Fall Prevention: safety round/check completed  Taken 2/20/2024 2200 by Eun Brewer RN  Safety Promotion/Fall Prevention: safety round/check completed  Taken 2/20/2024 2105 by Eun Brewer RN  Safety Promotion/Fall Prevention: safety round/check completed  Taken 2/20/2024 2100 by Eun Brewer RN  Safety Promotion/Fall Prevention: safety round/check completed  Taken 2/20/2024 2000 by Eun Brewer RN  Safety Promotion/Fall Prevention: safety round/check completed  Taken 2/20/2024  1900 by Eun Brewer, RN  Safety Promotion/Fall Prevention: safety round/check completed   Goal Outcome Evaluation:

## 2024-02-21 NOTE — THERAPY EVALUATION
Patient Name: Atilio Skelton  : 1955    MRN: [id number]                              Today's Date: 2024       Admit Date: 2024    Visit Dx:     ICD-10-CM ICD-9-CM   1. Impaired mobility [Z74.09]  Z74.09 799.89   2. Coronary artery disease involving native coronary artery of native heart with other form of angina pectoris  I25.118 414.01     413.9     Patient Active Problem List   Diagnosis    Coronary artery disease of native artery of native heart with stable angina pectoris    PVC (premature ventricular contraction)    Abnormal nuclear stress test    Coronary artery disease    CAD (coronary artery disease), native coronary artery    CAD (coronary artery disease)    Hyperlipidemia    Hypertension    Class 1 obesity with body mass index (BMI) of 33.0 to 33.9 in adult    Non-smoker     Past Medical History:   Diagnosis Date    Arrhythmia     Arthritis     Asthma     Cataracts, bilateral     Concussion     Glaucoma     Hyperlipidemia     Macular degeneration     Nasal fracture     Vaso-vagal reaction      Past Surgical History:   Procedure Laterality Date    APPENDECTOMY      CARDIAC CATHETERIZATION Left 2024    Procedure: Cardiac Catheterization/Vascular Study;  Surgeon: Ronnie Tuttle MD;  Location: [facility] CATH INVASIVE LOCATION;  Service: Cardiology;  Laterality: Left;    CORONARY ARTERY BYPASS GRAFT N/A 2024    Procedure: CORONARY ARTERY BYPASS GRAFTING X4, BILATERAL INTERNAL MAMMARY ARTERY GRAFT, LEFT LEG ENDOSCOPIC VEIN HARVEST, TRANSESOPHAGEAL ECHOCARDIOGRAM, XP STERNAL PLATING;  Surgeon: Jose Luis Christine MD;  Location: [facility] OR;  Service: Cardiothoracic;  Laterality: N/A;    EXTERNAL EAR SURGERY      GALLBLADDER SURGERY      LEG SURGERY      MOUTH SURGERY  2024    had 5 teeth removed    TONSILLECTOMY AND ADENOIDECTOMY      VASECTOMY        General Information       Row Name 24 0855          Physical Therapy Time and Intention    Document Type evaluation  CAD s/p  2/20 CABG x 4, L EVH, sternal plating.  -TUCKER     Mode of Treatment physical therapy  -TUCKER       Row Name 02/21/24 0855          General Information    Patient Profile Reviewed yes  -TUCKER     Prior Level of Function independent:;all household mobility;ADL's  -TUCKER     Existing Precautions/Restrictions fall;sternal;oxygen therapy device and L/min  chest tube x 2  -TUCKER     Barriers to Rehab medically complex  -TUCKER       Row Name 02/21/24 0855          Living Environment    People in Home spouse  -TUCKER       Row Name 02/21/24 0855          Home Main Entrance    Number of Stairs, Main Entrance two  -TUCKER       Row Name 02/21/24 0855          Stairs Within Home, Primary    Number of Stairs, Within Home, Primary none  -TUCKER       Row Name 02/21/24 0855          Cognition    Orientation Status (Cognition) oriented x 4  -TUCKER       Row Name 02/21/24 0855          Safety Issues, Functional Mobility    Impairments Affecting Function (Mobility) balance;endurance/activity tolerance;strength;shortness of breath;pain  -TUCKER               User Key  (r) = Recorded By, (t) = Taken By, (c) = Cosigned By      Initials Name Provider Type    Warren Nguyen, PT DPT Physical Therapist                   Mobility       Row Name 02/21/24 0855          Bed Mobility    Comment, (Bed Mobility) in chair  -TUCKER       Row Name 02/21/24 0855          Sit-Stand Transfer    Sit-Stand [place] (Transfers) minimum assist (75% patient effort);2 person assist  -TUCKER       Row Name 02/21/24 0855          Gait/Stairs (Locomotion)    [place] Level (Gait) minimum assist (75% patient effort);2 person assist  -TUCKER     Distance in Feet (Gait) 200 ft w 1 standing rest break  -TUCKER     Deviations/Abnormal Patterns (Gait) [place] decreased;gait speed decreased  -TUCKER     Bilateral Gait Deviations forward flexed posture  -TUCKER               User Key  (r) = Recorded By, (t) = Taken By, (c) = Cosigned By      Initials Name Provider Type    Warren Nguyen, PT DPT Physical  Therapist                   Obj/Interventions       Row Name 02/21/24 0855          Range of Motion Comprehensive    General Range of Motion bilateral lower extremity ROM WFL  -TUCKER       Row Name 02/21/24 0855          Strength Comprehensive (MMT)    Comment, General Manual Muscle Testing (MMT) Assessment B LE grossly 4-/5  -TUCKER       Row Name 02/21/24 0855          Balance    Balance Assessment sitting dynamic balance;standing dynamic balance  -TUCKER     Dynamic Sitting Balance supervision  -TUCKER     Position, Sitting Balance unsupported;sitting in chair  -TUCKER     Dynamic Standing Balance minimal assist  -TUCKER     Position/Device Used, Standing Balance supported  -TUCKER               User Key  (r) = Recorded By, (t) = Taken By, (c) = Cosigned By      Initials Name Provider Type    Warren Nguyen, PT DPT Physical Therapist                   Goals/Plan       Row Name 02/21/24 0855          Bed Mobility Goal 1 (PT)    Activity/Assistive Device (Bed Mobility Goal 1, PT) sit to supine/supine to sit  -TUCKER     [place] Level/Cues Needed (Bed Mobility Goal 1, PT) supervision required  -TUCKER     Time Frame (Bed Mobility Goal 1, PT) long term goal (LTG);10 days  -TUCKER     Progress/Outcomes (Bed Mobility Goal 1, PT) new goal  -TUCKER       Row Name 02/21/24 0855          Transfer Goal 1 (PT)    Activity/Assistive Device (Transfer Goal 1, PT) sit-to-stand/stand-to-sit;bed-to-chair/chair-to-bed  -TUCKER     [place] Level/Cues Needed (Transfer Goal 1, PT) supervision required  -TUCKER     Time Frame (Transfer Goal 1, PT) long term goal (LTG);10 days  -TUCKER     Progress/Outcome (Transfer Goal 1, PT) new goal  -TUCKER       Row Name 02/21/24 0855          Gait Training Goal 1 (PT)    Activity/Assistive Device (Gait Training Goal 1, PT) gait (walking locomotion);decrease fall risk;improve balance and speed;increase endurance/gait distance;increase energy conservation  -TUCKER     [place] Level (Gait Training Goal 1, PT) supervision required  -TUCKER      Distance (Gait Training Goal 1, PT) 250 ft  -TUCKER     Time Frame (Gait Training Goal 1, PT) long term goal (LTG);10 days  -TUCKER     Progress/Outcome (Gait Training Goal 1, PT) new goal  -TUCKER       Row Name 02/21/24 0855          Stairs Goal 1 (PT)    Activity/Assistive Device (Stairs Goal 1, PT) ascending stairs;descending stairs  -TUCKER     [place] Level/Cues Needed (Stairs Goal 1, PT) supervision required  -TUCKER     Number of Stairs (Stairs Goal 1, PT) 2 steps  -TUCKER     Time Frame (Stairs Goal 1, PT) long term goal (LTG);10 days  -TUCKER     Progress/Outcome (Stairs Goal 1, PT) new goal  -TUCKER       Row Name 02/21/24 0855          Therapy Assessment/Plan (PT)    Planned Therapy Interventions (PT) bed mobility training;transfer training;gait training;balance training;home exercise program;patient/family education;postural re-education;stair training;strengthening  -TUCKER               User Key  (r) = Recorded By, (t) = Taken By, (c) = Cosigned By      Initials Name Provider Type    Warren Nguyen, PT DPT Physical Therapist                   Clinical Impression       Row Name 02/21/24 0855          Pain    Pain Intervention(s) Repositioned;Ambulation/increased activity  -TUCKER     Additional Documentation Pain Scale: FACES Pre/Post-Treatment (Group)  -TUCKER       Row Name 02/21/24 0855          Pain Scale: FACES Pre/Post-Treatment    Pain: FACES Scale, Pretreatment 4-->hurts little more  -TUCKER     Pain Location incisional  -TUCKER     Pain Location - chest  -TUCKER       Row Name 02/21/24 0855          Plan of Care Review    Plan of Care Reviewed With patient  -TUCKER     Outcome Evaluation PT eval complete. He is alert and oriented x 4, spouse in room. They were educated on sternal precautions. He reports being ind his mobility prior to surgery. Today needs min assist x 2 to stand and min x 1-2 to ambulate. Ambulates 200 ft around the unit with 1 standing rest break due to SOB. Demos pain and SOB with activity. HR increased to 135 with gait.  PT will cont with mobiilty, balance, activity tolerance and strengthening. Anticipate he will cont to recover and d.c home w spouse.  -TUCKER       Row Name 02/21/24 0855          Therapy Assessment/Plan (PT)    Patient/Family Therapy Goals Statement (PT) go home  -TUCKER     Rehab Potential (PT) good, to achieve stated therapy goals  -TUCKER     Criteria for Skilled Interventions Met (PT) yes;meets criteria;skilled treatment is necessary  -TUCKER     Therapy Frequency (PT) 2 times/day  -TUCKER     Predicted Duration of Therapy Intervention (PT) unti ld.c  -TUCKER       Row Name 02/21/24 0855          Vital Signs    Pre Systolic BP Rehab 102  -TUCKER     Pre Treatment Diastolic BP 64  -TUCKER     Post Systolic BP Rehab 106  -TUCKER     Post Treatment Diastolic BP 75  -TUCKER     Pretreatment Heart Rate (beats/min) 104  -TUCKER     Intratreatment Heart Rate (beats/min) 135  -TUCKER     Posttreatment Heart Rate (beats/min) 106  -TUCKER     Pre SpO2 (%) 97  -TUCKER     O2 Delivery Pre Treatment nasal cannula  -TUCKER     O2 Delivery Intra Treatment nasal cannula  -TUCKER     Post SpO2 (%) 96  -TUCKER     O2 Delivery Post Treatment nasal cannula  -TUCKER     Pre Patient Position Sitting  -TUCKER     Intra Patient Position Standing  -TUCKER     Post Patient Position Sitting  -TUCKER       Row Name 02/21/24 0855          Positioning and Restraints    Pre-Treatment Position sitting in chair/recliner  -TUCKER     Post Treatment Position chair  -TUCKER     In Chair notified nsg;reclined;call light within reach;encouraged to call for assist;RUE elevated;LUE elevated;legs elevated;with family/caregiver;patient within staff view  -TUCKER               User Key  (r) = Recorded By, (t) = Taken By, (c) = Cosigned By      Initials Name Provider Type    Warren Nguyen, PT DPT Physical Therapist                   Outcome Measures       Row Name 02/21/24 0855 02/21/24 0800       How much help from another person do you currently need...    Turning from your back to your side while in flat bed without using bedrails? 2  -TUCKER  3  -MD    Moving from lying on back to sitting on the side of a flat bed without bedrails? 2  -TUCKER 3  -MD    Moving to and from a bed to a chair (including a wheelchair)? 3  -TUCKER 3  -MD    Standing up from a chair using your arms (e.g., wheelchair, bedside chair)? 3  -TUCKER 3  -MD    Climbing 3-5 steps with a railing? 2  -TUCKER 3  -MD    To walk in hospital room? 3  -TUCKER 3  -MD    AM-PAC 6 Clicks Score (PT) 15  -TUCKER 18  -MD    Highest Level of Mobility Goal 4 --> Transfer to chair/commode  -TUCKER 6 --> Walk 10 steps or more  -MD      Row Name 02/21/24 0855          Functional Assessment    Outcome Measure Options AM-PAC 6 Clicks Basic Mobility (PT)  -TUCKER               User Key  (r) = Recorded By, (t) = Taken By, (c) = Cosigned By      Initials Name Provider Type    Warren Nguyen, PT DPT Physical Therapist    No Celaya, RN Registered Nurse                                 Physical Therapy Education       Title: PT OT SLP Therapies (In Progress)       Topic: Physical Therapy (In Progress)       Point: Mobility training (Done)       Learning Progress Summary             Patient Acceptance, E, VU,DU,NR by TUCKER at 2/21/2024 0855    Comment: benefits of activity, progression of PT, sternal prec                         Point: Home exercise program (Not Started)       Learner Progress:  Not documented in this visit.              Point: Body mechanics (Not Started)       Learner Progress:  Not documented in this visit.              Point: Precautions (Done)       Learning Progress Summary             Patient Acceptance, E, VU,DU,NR by TUCKER at 2/21/2024 0855    Comment: benefits of activity, progression of PT, sternal prec                                         User Key       Initials Effective Dates Name Provider Type Discipline    TUCKER 02/03/23 -  Warren Tolentino, PT DPT Physical Therapist PT                  PT Recommendation and Plan  Planned Therapy Interventions (PT): bed mobility training, transfer training, gait  training, balance training, home exercise program, patient/family education, postural re-education, stair training, strengthening  Plan of Care Reviewed With: patient  Outcome Evaluation: PT eval complete. He is alert and oriented x 4, spouse in room. They were educated on sternal precautions. He reports being ind his mobility prior to surgery. Today needs min assist x 2 to stand and min x 1-2 to ambulate. Ambulates 200 ft around the unit with 1 standing rest break due to SOB. Demos pain and SOB with activity. HR increased to 135 with gait. PT will cont with mobiilty, balance, activity tolerance and strengthening. Anticipate he will cont to recover and d.c home w spouse.     Time Calculation:         PT Charges       Row Name 02/21/24 1047             Time Calculation    Start Time 0855  -TUCKER      Stop Time 0950  -TUCKER      Time Calculation (min) 55 min  -TUCKER      PT Received On 02/21/24  -TUCKER      PT Goal Re-Cert Due Date 03/02/24  -TUCKER                User Key  (r) = Recorded By, (t) = Taken By, (c) = Cosigned By      Initials Name Provider Type    Warren Nguyen, PT DPT Physical Therapist                  Therapy Charges for Today       Code Description Service Date Service Provider Modifiers Qty    [id number]  PT BELLOAL MOD COMPLEXITY 4 2/21/2024 Warren Tolentino PT DPT GP 1            PT G-Codes  Outcome Measure Options: AM-PAC 6 Clicks Basic Mobility (PT)  AM-PAC 6 Clicks Score (PT): 15  PT Discharge Summary  Anticipated Discharge Disposition (PT): home with assist    Warren Tolentino PT DPT  2/21/2024

## 2024-02-22 ENCOUNTER — APPOINTMENT (OUTPATIENT)
Dept: GENERAL RADIOLOGY | Facility: HOSPITAL | Age: 69
DRG: 236 | End: 2024-02-22
Payer: MEDICARE

## 2024-02-22 LAB
ANION GAP SERPL CALCULATED.3IONS-SCNC: 14 MMOL/L (ref 5–15)
BUN SERPL-MCNC: 18 MG/DL (ref 8–23)
BUN/CREAT SERPL: 18.2 (ref 7–25)
CALCIUM SPEC-SCNC: 9.1 MG/DL (ref 8.6–10.5)
CHLORIDE SERPL-SCNC: 100 MMOL/L (ref 98–107)
CO2 SERPL-SCNC: 22 MMOL/L (ref 22–29)
CREAT SERPL-MCNC: 0.99 MG/DL (ref 0.76–1.27)
DEPRECATED RDW RBC AUTO: 49.7 FL (ref 37–54)
EGFRCR SERPLBLD CKD-EPI 2021: 83 ML/MIN/1.73
ERYTHROCYTE [DISTWIDTH] IN BLOOD BY AUTOMATED COUNT: 13.6 % (ref 12.3–15.4)
GLUCOSE SERPL-MCNC: 130 MG/DL (ref 65–99)
HCT VFR BLD AUTO: 38.2 % (ref 37.5–51)
HGB BLD-MCNC: 12.2 G/DL (ref 13–17.7)
MCH RBC QN AUTO: 32 PG (ref 26.6–33)
MCHC RBC AUTO-ENTMCNC: 31.9 G/DL (ref 31.5–35.7)
MCV RBC AUTO: 100.3 FL (ref 79–97)
PLATELET # BLD AUTO: 128 10*3/MM3 (ref 140–450)
PMV BLD AUTO: 11.7 FL (ref 6–12)
POTASSIUM SERPL-SCNC: 4 MMOL/L (ref 3.5–5.2)
QT INTERVAL: 406 MS
QTC INTERVAL: 425 MS
RBC # BLD AUTO: 3.81 10*6/MM3 (ref 4.14–5.8)
SODIUM SERPL-SCNC: 136 MMOL/L (ref 136–145)
WBC NRBC COR # BLD AUTO: 14.56 10*3/MM3 (ref 3.4–10.8)

## 2024-02-22 PROCEDURE — 25010000002 CEFAZOLIN PER 500 MG: Performed by: NURSE PRACTITIONER

## 2024-02-22 PROCEDURE — 93010 ELECTROCARDIOGRAM REPORT: CPT | Performed by: EMERGENCY MEDICINE

## 2024-02-22 PROCEDURE — 25010000002 FUROSEMIDE PER 20 MG: Performed by: NURSE PRACTITIONER

## 2024-02-22 PROCEDURE — 80048 BASIC METABOLIC PNL TOTAL CA: CPT | Performed by: NURSE PRACTITIONER

## 2024-02-22 PROCEDURE — 97116 GAIT TRAINING THERAPY: CPT

## 2024-02-22 PROCEDURE — 25010000002 ENOXAPARIN PER 10 MG: Performed by: NURSE PRACTITIONER

## 2024-02-22 PROCEDURE — 93005 ELECTROCARDIOGRAM TRACING: CPT | Performed by: SURGERY

## 2024-02-22 PROCEDURE — 71045 X-RAY EXAM CHEST 1 VIEW: CPT

## 2024-02-22 PROCEDURE — 25010000002 AMIODARONE IN DEXTROSE 5% 360-4.14 MG/200ML-% SOLUTION: Performed by: NURSE PRACTITIONER

## 2024-02-22 PROCEDURE — 85027 COMPLETE CBC AUTOMATED: CPT | Performed by: NURSE PRACTITIONER

## 2024-02-22 RX ORDER — FUROSEMIDE 10 MG/ML
20 INJECTION INTRAMUSCULAR; INTRAVENOUS
Status: DISCONTINUED | OUTPATIENT
Start: 2024-02-22 | End: 2024-02-25 | Stop reason: HOSPADM

## 2024-02-22 RX ORDER — POTASSIUM CHLORIDE 750 MG/1
20 CAPSULE, EXTENDED RELEASE ORAL 2 TIMES DAILY WITH MEALS
Status: DISCONTINUED | OUTPATIENT
Start: 2024-02-22 | End: 2024-02-25 | Stop reason: HOSPADM

## 2024-02-22 RX ORDER — LOSARTAN POTASSIUM 50 MG/1
25 TABLET ORAL
Status: DISCONTINUED | OUTPATIENT
Start: 2024-02-22 | End: 2024-02-25 | Stop reason: HOSPADM

## 2024-02-22 RX ORDER — BISACODYL 10 MG
10 SUPPOSITORY, RECTAL RECTAL ONCE
Status: COMPLETED | OUTPATIENT
Start: 2024-02-22 | End: 2024-02-22

## 2024-02-22 RX ADMIN — AMIODARONE HYDROCHLORIDE 0.5 MG/MIN: 1.8 INJECTION, SOLUTION INTRAVENOUS at 01:27

## 2024-02-22 RX ADMIN — CHLORHEXIDINE GLUCONATE 15 ML: 1.2 SOLUTION ORAL at 06:10

## 2024-02-22 RX ADMIN — BISACODYL 10 MG: 5 TABLET, COATED ORAL at 08:54

## 2024-02-22 RX ADMIN — METOPROLOL TARTRATE 25 MG: 25 TABLET, FILM COATED ORAL at 21:27

## 2024-02-22 RX ADMIN — AMIODARONE HYDROCHLORIDE 400 MG: 200 TABLET ORAL at 21:28

## 2024-02-22 RX ADMIN — OXYCODONE HYDROCHLORIDE 5 MG: 5 TABLET ORAL at 21:27

## 2024-02-22 RX ADMIN — ASPIRIN 81 MG: 81 TABLET, COATED ORAL at 08:51

## 2024-02-22 RX ADMIN — POTASSIUM CHLORIDE 20 MEQ: 750 CAPSULE, EXTENDED RELEASE ORAL at 17:32

## 2024-02-22 RX ADMIN — AMIODARONE HYDROCHLORIDE 400 MG: 200 TABLET ORAL at 08:51

## 2024-02-22 RX ADMIN — PANTOPRAZOLE SODIUM 40 MG: 40 TABLET, DELAYED RELEASE ORAL at 06:10

## 2024-02-22 RX ADMIN — METHOCARBAMOL 500 MG: 500 TABLET, FILM COATED ORAL at 14:53

## 2024-02-22 RX ADMIN — METHOCARBAMOL 500 MG: 500 TABLET, FILM COATED ORAL at 21:27

## 2024-02-22 RX ADMIN — OXYCODONE HYDROCHLORIDE 5 MG: 5 TABLET ORAL at 04:24

## 2024-02-22 RX ADMIN — TIMOLOL MALEATE: 5 SOLUTION/ DROPS OPHTHALMIC at 08:52

## 2024-02-22 RX ADMIN — BISACODYL 10 MG: 10 SUPPOSITORY RECTAL at 08:49

## 2024-02-22 RX ADMIN — FUROSEMIDE 20 MG: 10 INJECTION, SOLUTION INTRAVENOUS at 17:33

## 2024-02-22 RX ADMIN — POLYETHYLENE GLYCOL 3350 17 G: 17 POWDER, FOR SOLUTION ORAL at 08:51

## 2024-02-22 RX ADMIN — Medication 1 APPLICATION: at 06:10

## 2024-02-22 RX ADMIN — CEFAZOLIN 2 G: 2 INJECTION, POWDER, FOR SOLUTION INTRAMUSCULAR; INTRAVENOUS at 04:24

## 2024-02-22 RX ADMIN — CLOPIDOGREL BISULFATE 75 MG: 75 TABLET, FILM COATED ORAL at 17:34

## 2024-02-22 RX ADMIN — ACETAMINOPHEN 650 MG: 325 TABLET, FILM COATED ORAL at 17:32

## 2024-02-22 RX ADMIN — METHOCARBAMOL 500 MG: 500 TABLET, FILM COATED ORAL at 06:10

## 2024-02-22 RX ADMIN — ACETAMINOPHEN 650 MG: 325 TABLET, FILM COATED ORAL at 06:10

## 2024-02-22 RX ADMIN — ACETAMINOPHEN 650 MG: 325 TABLET, FILM COATED ORAL at 23:03

## 2024-02-22 RX ADMIN — POTASSIUM CHLORIDE 20 MEQ: 750 CAPSULE, EXTENDED RELEASE ORAL at 08:51

## 2024-02-22 RX ADMIN — FUROSEMIDE 20 MG: 10 INJECTION, SOLUTION INTRAVENOUS at 08:54

## 2024-02-22 RX ADMIN — ATORVASTATIN CALCIUM 20 MG: 10 TABLET, FILM COATED ORAL at 21:28

## 2024-02-22 RX ADMIN — OXYCODONE HYDROCHLORIDE 5 MG: 5 TABLET ORAL at 08:50

## 2024-02-22 RX ADMIN — ENOXAPARIN SODIUM 40 MG: 100 INJECTION SUBCUTANEOUS at 08:53

## 2024-02-22 RX ADMIN — OXYCODONE HYDROCHLORIDE 5 MG: 5 TABLET ORAL at 12:54

## 2024-02-22 RX ADMIN — LATANOPROST 1 DROP: 50 SOLUTION/ DROPS OPHTHALMIC at 21:28

## 2024-02-22 RX ADMIN — LOSARTAN POTASSIUM 25 MG: 50 TABLET, FILM COATED ORAL at 08:56

## 2024-02-22 RX ADMIN — METOPROLOL TARTRATE 25 MG: 25 TABLET, FILM COATED ORAL at 08:56

## 2024-02-22 RX ADMIN — ACETAMINOPHEN 650 MG: 325 TABLET, FILM COATED ORAL at 12:51

## 2024-02-22 RX ADMIN — LIDOCAINE 2 PATCH: 700 PATCH TOPICAL at 08:53

## 2024-02-22 NOTE — PLAN OF CARE
Goal Outcome Evaluation:      Patient stands and sits with CGA. Ambulated 250 with Min assist. ! LOB due to very slow speed. When walked a litlle closer to normal pace balance improved. Reviewed sternal restrictions.

## 2024-02-22 NOTE — PLAN OF CARE
Goal Outcome Evaluation:  Plan of Care Reviewed With: patient        Progress: no change  Outcome Evaluation: Patient had reports of pain this shift, treated with prn medication. Patient amio gtt still going at .5mg/min. IV antibx completed this shift. S 63-74 per tele. Patient able to walk past tele room and back to room with a few breaks. No bm this shift. Up in chair. Call light in reach.

## 2024-02-22 NOTE — PROGRESS NOTES
"Patient name: Atilio Skelton  Patient : 1955  VISIT # [id number]  MR #[id number]    Procedure:Procedure(s):  CORONARY ARTERY BYPASS GRAFTING X4, BILATERAL INTERNAL MAMMARY ARTERY GRAFT, LEFT LEG ENDOSCOPIC VEIN HARVEST, TRANSESOPHAGEAL ECHOCARDIOGRAM, XP STERNAL PLATING  Procedure Date:2024  POD:2 Days Post-Op    Subjective     Converted to sinus rhythm yesterday afternoon.  He is tolerating room air.  Weight is up 2 pounds and he has 8 pounds above his baseline weight.  No bowel movement.  Walking 200 feet with physical therapy.      Telemetry: Sinus rhythm 60s  IV drips: Amiodarone       Objective     Visit Vitals  /92 (BP Location: Right arm, Patient Position: Lying)   Pulse 68   Temp 99.2 °F (37.3 °C) (Oral)   Resp 16   Ht 177.5 cm (69.88\")   Wt 109 kg (240 lb 12.8 oz)   SpO2 92%   BMI 34.67 kg/m²   Baseline weight 232 pounds    Intake/Output Summary (Last 24 hours) at 2024 0726  Last data filed at 2024 0602  Gross per 24 hour   Intake 1029.6 ml   Output 830 ml   Net 199.6 ml     MCT: 110 mL in 24 hours, serosanguineous, no airleak  Right and left pleural drain: 170 mL in 24 hours, serosanguineous, no airleak    Lab:     CBC:  Results from last 7 days   Lab Units 24  0351 24  0215 24  1601   WBC 10*3/mm3 14.56* 8.52 7.35   HEMATOCRIT % 38.2 35.6* 37.9   PLATELETS 10*3/mm3 128* 113* 108*          BMP:  Results from last 7 days   Lab Units 24  0351 24  0215 24  1601   SODIUM mmol/L 136 140 142   POTASSIUM mmol/L 4.0 4.0 4.4   CHLORIDE mmol/L 100 106 108*   CO2 mmol/L 22.0 24.0 23.0   GLUCOSE mg/dL 130* 156* 169*   BUN mg/dL 18 14 14   CREATININE mg/dL 0.99 0.91 0.90          COAG:  Results from last 7 days   Lab Units 24  0215 24  1248   INR  1.16* 1.26*   APTT seconds  --  37.2*       IMAGES:       Imaging Results (Last 24 Hours)       Procedure Component Value Units Date/Time    XR Chest 1 View [[id number]] Collected: 24 " 0618     Updated: 02/22/24 0623    Narrative:      EXAMINATION: XR CHEST 1 VW-     2/22/2024 2:50 AM     HISTORY: Post-Op Heart Surgery     A frontal projection of the chest is compared with the previous study  dated 2/21/2024.     There is interval removal of the right internal jugular vascular sheath.     The lungs are poorly expanded similar to the previous study.     There are atelectatic changes and small bibasilar pleural effusion.     Bilateral chest tubes are in place. No change.     There is no pulmonary congestion or pneumothorax.     There is moderate cardiomegaly. There is evidence of previous cardiac  surgery.     A mediastinal drain is in place.       Impression:      1. Removal of the right internal jugular vascular sheath since the  previous study. Bilateral chest tubes and mediastinal drain are in  place.  2. Poorly expanded lungs. No change in the cardiopulmonary status since  the previous study.        This report was signed and finalized on 2/22/2024 6:20 AM by Dr. Darling Aguayo MD.             CXR: Chest tubes in stable position with no pneumothorax.  Hypoventilation with bibasilar atelectasis.  Stable exam.    Physical Exam:  General: Alert, oriented. No apparent distress.  Obese  Cardiovascular: Regular rate and rhythm without murmur, rubs, or gallops.    Pulmonary: Clear to auscultation bilaterally without wheezing, rubs, or rales.  Chest: Sternotomy incision clean, dry, and intact. Sternum stable. No clicks. Chest tube to 20 cm suction. No air leak. Fluid is serosanguineous.  Abdomen: Soft, nondistended, and nontender.  Extremities: Warm, moves all extremities.  Mild lower extremity edema. Saphenectomy site clean, dry, and intact.  Neurologic:  Grossly intact with no focal deficits.            Impression:  Coronary artery disease with stable angina  Hyperlipidemia, on statin  Hypertension, well-controlled  Obesity, BMI 33  Non-smoker  Paroxysmal atrial fibrillation, now sinus  rhythm        Plan:  Continue bowel regimen, suppository today  Diurese  Start losartan 25 mg daily  Discontinue mediastinal chest tubes, keep bilateral pleural drains 1 more day  Discontinue amiodarone drip after current bag infuses  Routine postcardiac surgery care  Encourage pulmonary toilet and ambulation  Anticipate discharge home in 1 to 2 days.  Discussed with patient and nursing      Aster Selby, APRN  02/22/24  07:26 CST

## 2024-02-22 NOTE — THERAPY TREATMENT NOTE
Acute Care - Physical Therapy Treatment Note   [place]     Patient Name: Atilio Skelton  : 1955  MRN: [id number]  Today's Date: 2024      Visit Dx:     ICD-10-CM ICD-9-CM   1. Impaired mobility [Z74.09]  Z74.09 799.89   2. Coronary artery disease involving native coronary artery of native heart with other form of angina pectoris  I25.118 414.01     413.9     Patient Active Problem List   Diagnosis    Coronary artery disease of native artery of native heart with stable angina pectoris    PVC (premature ventricular contraction)    Abnormal nuclear stress test    Coronary artery disease    CAD (coronary artery disease), native coronary artery    CAD (coronary artery disease)    Hyperlipidemia    Hypertension    Class 1 obesity with body mass index (BMI) of 33.0 to 33.9 in adult    Non-smoker     Past Medical History:   Diagnosis Date    Arrhythmia     Arthritis     Asthma     Cataracts, bilateral     Concussion     Glaucoma     Hyperlipidemia     Macular degeneration     Nasal fracture     Vaso-vagal reaction      Past Surgical History:   Procedure Laterality Date    APPENDECTOMY      CARDIAC CATHETERIZATION Left 2024    Procedure: Cardiac Catheterization/Vascular Study;  Surgeon: Ronnie Tuttle MD;  Location: [facility] CATH INVASIVE LOCATION;  Service: Cardiology;  Laterality: Left;    CORONARY ARTERY BYPASS GRAFT N/A 2024    Procedure: CORONARY ARTERY BYPASS GRAFTING X4, BILATERAL INTERNAL MAMMARY ARTERY GRAFT, LEFT LEG ENDOSCOPIC VEIN HARVEST, TRANSESOPHAGEAL ECHOCARDIOGRAM, XP STERNAL PLATING;  Surgeon: Jose Luis Christine MD;  Location: [facility] OR;  Service: Cardiothoracic;  Laterality: N/A;    EXTERNAL EAR SURGERY      GALLBLADDER SURGERY      LEG SURGERY      MOUTH SURGERY  2024    had 5 teeth removed    TONSILLECTOMY AND ADENOIDECTOMY      VASECTOMY       PT Assessment (last 12 hours)       PT Evaluation and Treatment       Row Name 24 0951          Physical Therapy Time and  Intention    Subjective Information complains of;fatigue;pain  -BEATRIZ     Document Type therapy note (daily note)  -BEATRIZ     Mode of Treatment physical therapy  -BEATRIZ       Row Name 02/22/24 0951          General Information    Existing Precautions/Restrictions fall;sternal  chest tube  -BEATRIZ       Row Name 02/22/24 0951          Pain    Posttreatment Pain Rating 5/10  -BEATRIZ     Pain Location incisional  -BEATRIZ     Pain Location - chest  -BEATRIZ       Row Name 02/22/24 0951          Sit-Stand Transfer    Sit-Stand [place] (Transfers) verbal cues;contact guard  -BEATRIZ       Row Name 02/22/24 0951          Stand-Sit Transfer    Stand-Sit [place] (Transfers) verbal cues;contact guard  -BEATRIZ       Row Name 02/22/24 0951          Gait/Stairs (Locomotion)    [place] Level (Gait) minimum assist (75% patient effort)  -BEATRIZ     Distance in Feet (Gait) 200  -BEATRIZ     Deviations/Abnormal Patterns (Gait) shweta decreased  -BEATRIZ     Comment, (Gait/Stairs) 1 slight LOB. Balance improved wuth cues to speed gait a lottle  -       Row Name 02/22/24 0951          Motor Skills    Comments, Therapeutic Exercise protocol x15  -BEATRIZ       Row Name             Wound 02/20/24 0812 midline sternal Incision    Wound - Properties Group Placement Date: 02/20/24  -TJ Placement Time: 0812  -TJ Orientation: midline  -TJ Location: sternal  -TJ Primary Wound Type: Incision  -TJ    Retired Wound - Properties Group Placement Date: 02/20/24  -TJ Placement Time: 0812  -TJ Orientation: midline  -TJ Location: sternal  -TJ Primary Wound Type: Incision  -TJ    Retired Wound - Properties Group Date first assessed: 02/20/24  -TJ Time first assessed: 0812  -TJ Location: sternal  -TJ Primary Wound Type: Incision  -TJ      Row Name             Wound 02/20/24 0810 Left leg Incision    Wound - Properties Group Placement Date: 02/20/24  -TJ Placement Time: 0810  -TJ Side: Left  -TJ Location: leg  -TJ Primary Wound Type: Incision  -TJ    Retired Wound - Properties Group  Placement Date: 02/20/24  -TJ Placement Time: 0810  -TJ Side: Left  -TJ Location: leg  -TJ Primary Wound Type: Incision  -TJ    Retired Wound - Properties Group Date first assessed: 02/20/24  -TJ Time first assessed: 0810  -TJ Side: Left  -TJ Location: leg  -TJ Primary Wound Type: Incision  -TJ      Row Name 02/22/24 0951          Positioning and Restraints    Pre-Treatment Position sitting in chair/recliner  -BEATRIZ     In Chair reclined;call light within reach;encouraged to call for assist;with family/caregiver;RUE elevated;LUE elevated  -BEATRIZ               User Key  (r) = Recorded By, (t) = Taken By, (c) = Cosigned By      Initials Name Provider Type    Aracelis Tan PTA Physical Therapist Assistant    Nadeen Hernandez RN Registered Nurse                    Physical Therapy Education       Title: PT OT SLP Therapies (In Progress)       Topic: Physical Therapy (In Progress)       Point: Mobility training (In Progress)       Learning Progress Summary             Patient Acceptance, E,D, NR by BEATRIZ at 2/22/2024 1037    Comment: gait training    Acceptance, E, VU,DU,NR by TUCKER at 2/21/2024 0855    Comment: benefits of activity, progression of PT, sternal prec                         Point: Home exercise program (Not Started)       Learner Progress:  Not documented in this visit.              Point: Body mechanics (Not Started)       Learner Progress:  Not documented in this visit.              Point: Precautions (Done)       Learning Progress Summary             Patient Acceptance, E, VU,DU,NR by TUCKER at 2/21/2024 0855    Comment: benefits of activity, progression of PT, sternal prec                                         User Key       Initials Effective Dates Name Provider Type Discipline    TUCKER 02/03/23 -  Warren Tolentino PT DPT Physical Therapist PT    BEATRIZ 02/03/23 -  Aracelis Gandhi PTA Physical Therapist Assistant PT                  PT Recommendation and Plan         Outcome Measures       Row Name  02/22/24 1000             How much help from another person do you currently need...    Turning from your back to your side while in flat bed without using bedrails? 2  -BEATRIZ      Moving from lying on back to sitting on the side of a flat bed without bedrails? 2  -BEATRIZ      Moving to and from a bed to a chair (including a wheelchair)? 3  -BEATRIZ      Standing up from a chair using your arms (e.g., wheelchair, bedside chair)? 3  -BEATRIZ      Climbing 3-5 steps with a railing? 3  -BEATRIZ      To walk in hospital room? 3  -BEATRIZ      AM-PAC 6 Clicks Score (PT) 16  -BEATRIZ      Highest Level of Mobility Goal 5 --> Static standing  -BEATRIZ                User Key  (r) = Recorded By, (t) = Taken By, (c) = Cosigned By      Initials Name Provider Type    Aracelis Tan PTA Physical Therapist Assistant                     Time Calculation:    PT Charges       Row Name 02/22/24 1039             Time Calculation    Start Time 0951  -BEATRIZ      Stop Time 1008  -BEATRIZ      Time Calculation (min) 17 min  -BEATRIZ         Timed Charges    60329 - Gait Training Minutes  17  -BEATRIZ         Total Minutes    Timed Charges Total Minutes 17  -BEATRIZ       Total Minutes 17  -BEATRIZ                User Key  (r) = Recorded By, (t) = Taken By, (c) = Cosigned By      Initials Name Provider Type    Aracelis Tan PTA Physical Therapist Assistant                  Therapy Charges for Today       Code Description Service Date Service Provider Modifiers Qty    94507069643 HC GAIT TRAINING EA 15 MIN 2/21/2024 Aracelis Gandhi PTA GP 2    54044674050 HC GAIT TRAINING EA 15 MIN 2/22/2024 Aracelis Gandhi PTA GP 1            PT G-Codes  Outcome Measure Options: AM-PAC 6 Clicks Basic Mobility (PT)  AM-PAC 6 Clicks Score (PT): 16    Aracelis Gandhi PTA  2/22/2024

## 2024-02-22 NOTE — PLAN OF CARE
Goal Outcome Evaluation:      Patient stood from recliner and toilet with CGA. Ambulated 220' with min assist. No LOB but patient is consistently off balance. Improves with constant reminders.. Patient will benefit from gait training.

## 2024-02-23 ENCOUNTER — APPOINTMENT (OUTPATIENT)
Dept: GENERAL RADIOLOGY | Facility: HOSPITAL | Age: 69
DRG: 236 | End: 2024-02-23
Payer: MEDICARE

## 2024-02-23 LAB
ANION GAP SERPL CALCULATED.3IONS-SCNC: 11 MMOL/L (ref 5–15)
BH BB BLOOD EXPIRATION DATE: NORMAL
BH BB BLOOD EXPIRATION DATE: NORMAL
BH BB BLOOD TYPE BARCODE: 9500
BH BB BLOOD TYPE BARCODE: 9500
BH BB DISPENSE STATUS: NORMAL
BH BB DISPENSE STATUS: NORMAL
BH BB PRODUCT CODE: NORMAL
BH BB PRODUCT CODE: NORMAL
BH BB UNIT NUMBER: NORMAL
BH BB UNIT NUMBER: NORMAL
BUN SERPL-MCNC: 18 MG/DL (ref 8–23)
BUN/CREAT SERPL: 20.7 (ref 7–25)
CALCIUM SPEC-SCNC: 8.8 MG/DL (ref 8.6–10.5)
CHLORIDE SERPL-SCNC: 102 MMOL/L (ref 98–107)
CO2 SERPL-SCNC: 23 MMOL/L (ref 22–29)
CREAT SERPL-MCNC: 0.87 MG/DL (ref 0.76–1.27)
CROSSMATCH INTERPRETATION: NORMAL
CROSSMATCH INTERPRETATION: NORMAL
DEPRECATED RDW RBC AUTO: 49.5 FL (ref 37–54)
EGFRCR SERPLBLD CKD-EPI 2021: 94 ML/MIN/1.73
ERYTHROCYTE [DISTWIDTH] IN BLOOD BY AUTOMATED COUNT: 13.7 % (ref 12.3–15.4)
GLUCOSE SERPL-MCNC: 113 MG/DL (ref 65–99)
HCT VFR BLD AUTO: 40.5 % (ref 37.5–51)
HGB BLD-MCNC: 12.9 G/DL (ref 13–17.7)
MCH RBC QN AUTO: 31.6 PG (ref 26.6–33)
MCHC RBC AUTO-ENTMCNC: 31.9 G/DL (ref 31.5–35.7)
MCV RBC AUTO: 99.3 FL (ref 79–97)
PLATELET # BLD AUTO: 132 10*3/MM3 (ref 140–450)
PMV BLD AUTO: 11.7 FL (ref 6–12)
POTASSIUM SERPL-SCNC: 3.8 MMOL/L (ref 3.5–5.2)
RBC # BLD AUTO: 4.08 10*6/MM3 (ref 4.14–5.8)
SODIUM SERPL-SCNC: 136 MMOL/L (ref 136–145)
UNIT  ABO: NORMAL
UNIT  ABO: NORMAL
UNIT  RH: NORMAL
UNIT  RH: NORMAL
WBC NRBC COR # BLD AUTO: 13.33 10*3/MM3 (ref 3.4–10.8)

## 2024-02-23 PROCEDURE — 25010000002 FUROSEMIDE PER 20 MG: Performed by: NURSE PRACTITIONER

## 2024-02-23 PROCEDURE — 93010 ELECTROCARDIOGRAM REPORT: CPT | Performed by: EMERGENCY MEDICINE

## 2024-02-23 PROCEDURE — 85027 COMPLETE CBC AUTOMATED: CPT | Performed by: NURSE PRACTITIONER

## 2024-02-23 PROCEDURE — 71046 X-RAY EXAM CHEST 2 VIEWS: CPT

## 2024-02-23 PROCEDURE — 97116 GAIT TRAINING THERAPY: CPT

## 2024-02-23 PROCEDURE — 25010000002 ENOXAPARIN PER 10 MG: Performed by: NURSE PRACTITIONER

## 2024-02-23 PROCEDURE — 93005 ELECTROCARDIOGRAM TRACING: CPT | Performed by: SURGERY

## 2024-02-23 PROCEDURE — 80048 BASIC METABOLIC PNL TOTAL CA: CPT | Performed by: NURSE PRACTITIONER

## 2024-02-23 RX ORDER — LIDOCAINE 4 G/G
2 PATCH TOPICAL
Status: DISCONTINUED | OUTPATIENT
Start: 2024-02-24 | End: 2024-02-25 | Stop reason: HOSPADM

## 2024-02-23 RX ADMIN — CLOPIDOGREL BISULFATE 75 MG: 75 TABLET, FILM COATED ORAL at 17:35

## 2024-02-23 RX ADMIN — AMIODARONE HYDROCHLORIDE 400 MG: 200 TABLET ORAL at 21:33

## 2024-02-23 RX ADMIN — ENOXAPARIN SODIUM 40 MG: 100 INJECTION SUBCUTANEOUS at 09:18

## 2024-02-23 RX ADMIN — METHOCARBAMOL 500 MG: 500 TABLET, FILM COATED ORAL at 05:45

## 2024-02-23 RX ADMIN — LIDOCAINE 2 PATCH: 700 PATCH TOPICAL at 09:18

## 2024-02-23 RX ADMIN — ACETAMINOPHEN 650 MG: 325 TABLET, FILM COATED ORAL at 17:35

## 2024-02-23 RX ADMIN — METOPROLOL TARTRATE 25 MG: 25 TABLET, FILM COATED ORAL at 21:33

## 2024-02-23 RX ADMIN — FUROSEMIDE 20 MG: 10 INJECTION, SOLUTION INTRAVENOUS at 17:35

## 2024-02-23 RX ADMIN — PANTOPRAZOLE SODIUM 40 MG: 40 TABLET, DELAYED RELEASE ORAL at 05:45

## 2024-02-23 RX ADMIN — AMIODARONE HYDROCHLORIDE 400 MG: 200 TABLET ORAL at 09:11

## 2024-02-23 RX ADMIN — LOSARTAN POTASSIUM 25 MG: 50 TABLET, FILM COATED ORAL at 09:11

## 2024-02-23 RX ADMIN — ATORVASTATIN CALCIUM 20 MG: 10 TABLET, FILM COATED ORAL at 21:33

## 2024-02-23 RX ADMIN — METOPROLOL TARTRATE 25 MG: 25 TABLET, FILM COATED ORAL at 09:11

## 2024-02-23 RX ADMIN — POTASSIUM CHLORIDE 20 MEQ: 750 CAPSULE, EXTENDED RELEASE ORAL at 17:35

## 2024-02-23 RX ADMIN — METHOCARBAMOL 500 MG: 500 TABLET, FILM COATED ORAL at 21:33

## 2024-02-23 RX ADMIN — METHOCARBAMOL 500 MG: 500 TABLET, FILM COATED ORAL at 13:29

## 2024-02-23 RX ADMIN — OXYCODONE HYDROCHLORIDE 5 MG: 5 TABLET ORAL at 21:40

## 2024-02-23 RX ADMIN — OXYCODONE HYDROCHLORIDE 5 MG: 5 TABLET ORAL at 04:10

## 2024-02-23 RX ADMIN — ASPIRIN 81 MG: 81 TABLET, COATED ORAL at 09:11

## 2024-02-23 RX ADMIN — POTASSIUM CHLORIDE 20 MEQ: 750 CAPSULE, EXTENDED RELEASE ORAL at 09:11

## 2024-02-23 RX ADMIN — TIMOLOL MALEATE: 5 SOLUTION/ DROPS OPHTHALMIC at 09:12

## 2024-02-23 RX ADMIN — ACETAMINOPHEN 650 MG: 325 TABLET, FILM COATED ORAL at 05:45

## 2024-02-23 RX ADMIN — ACETAMINOPHEN 650 MG: 325 TABLET, FILM COATED ORAL at 11:25

## 2024-02-23 RX ADMIN — FUROSEMIDE 20 MG: 10 INJECTION, SOLUTION INTRAVENOUS at 09:12

## 2024-02-23 RX ADMIN — LATANOPROST 1 DROP: 50 SOLUTION/ DROPS OPHTHALMIC at 21:33

## 2024-02-23 NOTE — PLAN OF CARE
Goal Outcome Evaluation:  Plan of Care Reviewed With: patient        Progress: no change  Outcome Evaluation: Schedule medication given for mild pain. He had several BMs today. Chest tube DC. Walking in [place] with PT. He is on RA. cont to monitor.

## 2024-02-23 NOTE — THERAPY TREATMENT NOTE
Acute Care - Physical Therapy Treatment Note   [place]     Patient Name: Atilio Skelton  : 1955  MRN: [id number]  Today's Date: 2024      Visit Dx:     ICD-10-CM ICD-9-CM   1. Impaired mobility [Z74.09]  Z74.09 799.89   2. Coronary artery disease involving native coronary artery of native heart with other form of angina pectoris  I25.118 414.01     413.9     Patient Active Problem List   Diagnosis    Coronary artery disease of native artery of native heart with stable angina pectoris    PVC (premature ventricular contraction)    Abnormal nuclear stress test    Coronary artery disease    CAD (coronary artery disease), native coronary artery    CAD (coronary artery disease)    Hyperlipidemia    Hypertension    Class 1 obesity with body mass index (BMI) of 33.0 to 33.9 in adult    Non-smoker     Past Medical History:   Diagnosis Date    Arrhythmia     Arthritis     Asthma     Cataracts, bilateral     Concussion     Glaucoma     Hyperlipidemia     Macular degeneration     Nasal fracture     Vaso-vagal reaction      Past Surgical History:   Procedure Laterality Date    APPENDECTOMY      CARDIAC CATHETERIZATION Left 2024    Procedure: Cardiac Catheterization/Vascular Study;  Surgeon: Ronnie Tuttle MD;  Location: [facility] CATH INVASIVE LOCATION;  Service: Cardiology;  Laterality: Left;    CORONARY ARTERY BYPASS GRAFT N/A 2024    Procedure: CORONARY ARTERY BYPASS GRAFTING X4, BILATERAL INTERNAL MAMMARY ARTERY GRAFT, LEFT LEG ENDOSCOPIC VEIN HARVEST, TRANSESOPHAGEAL ECHOCARDIOGRAM, XP STERNAL PLATING;  Surgeon: Jose Luis Christine MD;  Location: [facility] OR;  Service: Cardiothoracic;  Laterality: N/A;    EXTERNAL EAR SURGERY      GALLBLADDER SURGERY      LEG SURGERY      MOUTH SURGERY  2024    had 5 teeth removed    TONSILLECTOMY AND ADENOIDECTOMY      VASECTOMY       PT Assessment (last 12 hours)       PT Evaluation and Treatment       Row Name 24 0941          Physical Therapy Time and  Intention    Subjective Information complains of;fatigue  -BEATRIZ     Document Type therapy note (daily note)  -BEATRIZ     Mode of Treatment physical therapy  -BEATRIZ     Comment Discussed increasing length of walks  -BEATRZI       Row Name 02/23/24 0941          General Information    Existing Precautions/Restrictions fall;sternal  chest tube  -BEATRIZ       Row Name 02/23/24 0941          Pain    Posttreatment Pain Rating 3/10  -BEATRIZ     Pain Location incisional  -BEATRIZ     Pain Location - chest  -BEATRIZ       Row Name 02/23/24 0941          Sit-Stand Transfer    Sit-Stand [place] (Transfers) contact guard  -BEATRIZ       Row Name 02/23/24 0941          Stand-Sit Transfer    Stand-Sit [place] (Transfers) contact guard  -BEATRIZ       Row Name 02/23/24 0941          Toilet Transfer    Type (Toilet Transfer) sit-stand;stand-sit  -BEATRIZ     [place] Level (Toilet Transfer) contact guard  -BEATRIZ       Row Name 02/23/24 0941          Gait/Stairs (Locomotion)    [place] Level (Gait) contact guard  -BEATRIZ     Distance in Feet (Gait) 250  -BEATRIZ     Comment, (Gait/Stairs) Improved gait, good speed  -BEATRIZ       Row Name 02/23/24 0941          Motor Skills    Comments, Therapeutic Exercise warm ups  -BEATRIZ       Row Name             Wound 02/20/24 0812 midline sternal Incision    Wound - Properties Group Placement Date: 02/20/24  -TJ Placement Time: 0812  -TJ Orientation: midline  -TJ Location: sternal  -TJ Primary Wound Type: Incision  -TJ    Retired Wound - Properties Group Placement Date: 02/20/24  -TJ Placement Time: 0812  -TJ Orientation: midline  -TJ Location: sternal  -TJ Primary Wound Type: Incision  -TJ    Retired Wound - Properties Group Date first assessed: 02/20/24  -TJ Time first assessed: 0812  -TJ Location: sternal  -TJ Primary Wound Type: Incision  -TJ      Row Name             Wound 02/20/24 0810 Left leg Incision    Wound - Properties Group Placement Date: 02/20/24  -TJ Placement Time: 0810  -TJ Side: Left  -TJ Location: leg  -TJ Primary Wound  Type: Incision  -TJ    Retired Wound - Properties Group Placement Date: 02/20/24  -TJ Placement Time: 0810  -TJ Side: Left  -TJ Location: leg  -TJ Primary Wound Type: Incision  -TJ    Retired Wound - Properties Group Date first assessed: 02/20/24  -TJ Time first assessed: 0810  -TJ Side: Left  -TJ Location: leg  -TJ Primary Wound Type: Incision  -TJ      Row Name 02/23/24 0941          Positioning and Restraints    Pre-Treatment Position sitting in chair/recliner  -BEATRIZ     Post Treatment Position chair  -BEATRIZ     In Chair reclined;call light within reach;encouraged to call for assist;RUE elevated;LUE elevated  -BEATRIZ               User Key  (r) = Recorded By, (t) = Taken By, (c) = Cosigned By      Initials Name Provider Type    Aracelis Tan PTA Physical Therapist Assistant    Nadeen Hernandez RN Registered Nurse                    Physical Therapy Education       Title: PT OT SLP Therapies (In Progress)       Topic: Physical Therapy (In Progress)       Point: Mobility training (In Progress)       Learning Progress Summary             Patient Acceptance, E,D, NR by BEATRIZ at 2/22/2024 1037    Comment: gait training    Acceptance, E, VU,DU,NR by TUCKER at 2/21/2024 0855    Comment: benefits of activity, progression of PT, sternal prec                         Point: Home exercise program (Not Started)       Learner Progress:  Not documented in this visit.              Point: Body mechanics (Not Started)       Learner Progress:  Not documented in this visit.              Point: Precautions (In Progress)       Learning Progress Summary             Patient Acceptance, E,D, NR by BEATRIZ at 2/23/2024 1038    Comment: sternal restrictions    Acceptance, E, VU,DU,NR by TUCKER at 2/21/2024 0855    Comment: benefits of activity, progression of PT, sternal prec                                         User Key       Initials Effective Dates Name Provider Type Rocael CEBALLOS 02/03/23 -  Warren Tolentino PT DPT Physical Therapist PT     BEATRIZ 02/03/23 -  Aracelis Gandhi PTA Physical Therapist Assistant PT                  PT Recommendation and Plan         Outcome Measures       Row Name 02/23/24 1000 02/22/24 1000          How much help from another person do you currently need...    Turning from your back to your side while in flat bed without using bedrails? 3  -BEATRIZ 2  -BEATRIZ     Moving from lying on back to sitting on the side of a flat bed without bedrails? 3  -BEATRIZ 2  -BEATRIZ     Moving to and from a bed to a chair (including a wheelchair)? 3  -BEATRIZ 3  -BEATRIZ     Standing up from a chair using your arms (e.g., wheelchair, bedside chair)? 4  no arms  -BEATRIZ 3  -BEATRIZ     Climbing 3-5 steps with a railing? 3  -BEATRIZ 3  -BEATRIZ     To walk in hospital room? 3  -BEATRIZ 3  -BEATRIZ     AM-PAC 6 Clicks Score (PT) 19  -BEATRIZ 16  -BEATRIZ     Highest Level of Mobility Goal 6 --> Walk 10 steps or more  -BEATRIZ 5 --> Static standing  -BEATRIZ               User Key  (r) = Recorded By, (t) = Taken By, (c) = Cosigned By      Initials Name Provider Type    Aracelis Tan PTA Physical Therapist Assistant                     Time Calculation:    PT Charges       Row Name 02/23/24 1041             Time Calculation    Start Time 0941  -BEATRIZ      Stop Time 1006  -BEATRIZ      Time Calculation (min) 25 min  -EBATRIZ         Timed Charges    67912 - Gait Training Minutes  25  -BEATRIZ         Total Minutes    Timed Charges Total Minutes 25  -BEATRIZ       Total Minutes 25  -BEATRIZ                User Key  (r) = Recorded By, (t) = Taken By, (c) = Cosigned By      Initials Name Provider Type    Aracelis Tan PTA Physical Therapist Assistant                  Therapy Charges for Today       Code Description Service Date Service Provider Modifiers Qty    94664758370 HC GAIT TRAINING EA 15 MIN 2/22/2024 Aracelis Gandhi PTA GP 1    03561422747 HC GAIT TRAINING EA 15 MIN 2/22/2024 Aracelis Gandhi, ALYCIA GP 2    26981914819 HC GAIT TRAINING EA 15 MIN 2/23/2024 Aracelis Gandhi PTA GP 2            PT G-Codes  Outcome Measure  Options: AM-PAC 6 Clicks Basic Mobility (PT)  AM-PAC 6 Clicks Score (PT): 19    Aracelis Gandhi, PTA  2/23/2024

## 2024-02-23 NOTE — PROGRESS NOTES
"Patient name: Atilio Skelton  Patient : 1955  VISIT # [id number]  MR #[id number]    Procedure:Procedure(s):  CORONARY ARTERY BYPASS GRAFTING X4, BILATERAL INTERNAL MAMMARY ARTERY GRAFT, LEFT LEG ENDOSCOPIC VEIN HARVEST, TRANSESOPHAGEAL ECHOCARDIOGRAM, XP STERNAL PLATING  Procedure Date:2024  POD:3 Days Post-Op    Subjective     Patient is tolerating room air.  Weight is down 1 pound and he is 7 pounds above his baseline weight.  He is having bowel movements.  Walking 220 feet with physical therapy.  He has remained in sinus rhythm.      Telemetry: Sinus rhythm 60  IV drips: None       Objective     Visit Vitals  /72 (BP Location: Left arm, Patient Position: Lying)   Pulse 55   Temp 97.8 °F (36.6 °C) (Oral)   Resp 16   Ht 177.5 cm (69.88\")   Wt 109 kg (239 lb 9.6 oz)   SpO2 97%   BMI 34.50 kg/m²   Baseline weight 232 pounds    Intake/Output Summary (Last 24 hours) at 2024 0738  Last data filed at 2024 0700  Gross per 24 hour   Intake 600 ml   Output 1465 ml   Net -865 ml     Right and left pleural drain: 240 mL in 24 hours, serosanguineous, no airleak    Lab:     CBC:  Results from last 7 days   Lab Units 24  03424  0351 24  0215   WBC 10*3/mm3 13.33* 14.56* 8.52   HEMATOCRIT % 40.5 38.2 35.6*   PLATELETS 10*3/mm3 132* 128* 113*          BMP:  Results from last 7 days   Lab Units 24  0347 24  0351 24  0215   SODIUM mmol/L 136 136 140   POTASSIUM mmol/L 3.8 4.0 4.0   CHLORIDE mmol/L 102 100 106   CO2 mmol/L 23.0 22.0 24.0   GLUCOSE mg/dL 113* 130* 156*   BUN mg/dL 18 18 14   CREATININE mg/dL 0.87 0.99 0.91          COAG:  Results from last 7 days   Lab Units 24  0215 24  1248   INR  1.16* 1.26*   APTT seconds  --  37.2*       IMAGES:       Imaging Results (Last 24 Hours)       Procedure Component Value Units Date/Time    XR Chest PA & Lateral [[id number]] Collected: 24     Updated: 24    Narrative:      " EXAMINATION: XR CHEST PA AND LATERAL-     2/23/2024 3:44 AM     HISTORY: CAD, s/p CABG     2 views of the chest is compared with the previous study dated  2/22/2024.     The lungs are moderately well-expanded, left more than the right, since  the previous study.     Atelectatic changes in the lower lung bilaterally persist.     The pleural effusion has resolved since the previous study.     Bilateral chest tubes are in place. No visible pneumothorax.     There is moderate cardiomegaly. There is evidence of prior cardiac  surgery.     The mediastinal drain is not visualized in this study.     There is no acute bony abnormality.       Impression:      1. A moderate improved lung expansion and resolution of the bibasilar  pleural effusion since the previous study.           This report was signed and finalized on 2/23/2024 6:25 AM by Dr. Darling Aguayo MD.             CXR: Bilateral pleural drains in stable position with no pneumothorax.  Improving bibasilar atelectasis.    Physical Exam:  General: Alert, oriented. No apparent distress.  Obese  Cardiovascular: Regular rate and rhythm without murmur, rubs, or gallops.    Pulmonary: Clear to auscultation bilaterally without wheezing, rubs, or rales.  Chest: Sternotomy incision clean, dry, and intact. Sternum stable. No clicks.  Right and left pleural drains to 20 cm suction. No air leak. Fluid is serosanguineous.  Abdomen: Soft, slightly distended, and nontender.  Extremities: Warm, moves all extremities.  Mild lower extremity edema. Saphenectomy site clean, dry, and intact.  Neurologic:  Grossly intact with no focal deficits.            Impression:  Coronary artery disease with stable angina  Hyperlipidemia, on statin  Hypertension, well-controlled  Obesity, BMI 33  Non-smoker  Paroxysmal atrial fibrillation, now sinus rhythm        Plan:  Continue bowel regimen, can repeat suppository later today if needed due to abdominal distention  Continue diuresis  Likely  discontinue pleural drains later today  Routine postcardiac surgery care  Encourage pulmonary toilet and ambulation  Anticipate discharge home in 1 to 2 days.  Discussed with patient      Aster Sleby, ALESHA  02/23/24  07:38 CST

## 2024-02-23 NOTE — CASE MANAGEMENT/SOCIAL WORK
Continued Stay Note   [place]     Patient Name: Atilio Skelton  MRN: [id number]  Today's Date: 2/23/2024    Admit Date: 2/20/2024    Plan: Home   Discharge Plan       Row Name 02/23/24 1340       Plan    Plan Home    Final Discharge Disposition Code 01 - home or self-care    Final Note Anticipate discharge home soon. No definite dc planning needs identified.             RIMMA Samuels

## 2024-02-23 NOTE — DISCHARGE SUMMARY
[facility] Cardiothoracic Surgery  DISCHARGE SUMMARY        Date of Admission: 2/20/2024  Date of Discharge:  2/25/2024  Primary Care Physician: Zaheer Interiano    Discharge Diagnoses:  Active Hospital Problems    Diagnosis     **Coronary artery disease     Hyperlipidemia     Hypertension     Class 1 obesity with body mass index (BMI) of 33.0 to 33.9 in adult     Non-smoker     CAD (coronary artery disease), native coronary artery     CAD (coronary artery disease)        Procedures Performed:   CABG x4 (LIMA to LAD, IRENA to OM2 through transverse sinus, SVG to OM 1/Ramus, SVG to R-PDA) by Dr. Christine on 2/20/2024    HPI:  Mr. Atilio Skelton is a 68 y.o. male who presented to Dr. Christine as an outpatient with stable angina and severe multivessel coronary artery disease.  Preoperative testing was obtained and he was deemed a suitable surgical candidate.  Dr. Christine discussed with him the risks and benefits of proceeding with CABG, he understood these risks and agreed to proceed.    Hospital Course: On 2/20/2024, Mr. Skelton was taken to the operating room for coronary artery bypass grafting.  See separate op note by Dr. Christine detailing the operation.  Following surgery he was transferred to the ICU in stable but guarded condition.  He remained hemodynamically stable and was extubated without remark during the evening hours following surgery.  He demonstrated an intact neurological exam.  He was tolerating nasal cannula and ultimately room air.  He did have 1 episode of atrial fibrillation with RVR on postop day 1 and subsequently converted to sinus rhythm with amiodarone protocol.  When all IV drips were weaned off and he was able to walk a lap around the ICU with physical therapy, he was transferred to  on the afternoon of postop day 1.  The remainder of his hospital stay was significant for encouraging pulmonary toilet and ambulation, diuresis, and pain control.  The mediastinal chest tubes  were removed on postop day 2.  Right and left pleural drain was removed on postop day 3.  He is eating well and is demonstrated adequate bowel function.  He is tolerating room air and has met all physical therapy goals.  Pacing wires were removed without remark and he meets criteria for discharge home on postop day 5.  The patient is agreeable to this plan.    He will be discharged home with amiodarone 400 mg once daily for 7 days and will plan to decrease dosing to 200 mg daily at postop follow-up visit.  He will take Lasix 20 mg daily for 5 days.  Routine follow-up with me in 1 week.    Condition on Discharge:  Neurologically intact and has good pain control.  He is eating well and has demonstrable good bowel function.  The sternum is stable without clicks and the saphenectomy incisions are healing nicely.  The heart is in normal sinus rhythm.  He has met all physical therapy criteria and verbalizes understanding of sternotomy precautions.   He verbalizes understanding of a separately handed out cardiac surgery handout.       Discharge Disposition:  Home or Self Care [1]    Discharge Medications:     Discharge Medications        New Medications        Instructions Start Date   amiodarone 400 MG tablet  Commonly known as: PACERONE   400 mg, Oral, Daily      clopidogrel 75 MG tablet  Commonly known as: PLAVIX   75 mg, Oral, Daily      furosemide 20 MG tablet  Commonly known as: Lasix   20 mg, Oral, Daily      HYDROcodone-acetaminophen 5-325 MG per tablet  Commonly known as: Norco   1 tablet, Oral, Every 6 Hours PRN      pantoprazole 40 MG EC tablet  Commonly known as: PROTONIX   40 mg, Oral, Every Early Morning      potassium chloride 10 MEQ CR capsule  Commonly known as: MICRO-K   20 mEq, Oral, Daily             Continue These Medications        Instructions Start Date   aspirin 81 MG chewable tablet   81 mg, Oral, Daily      dorzolamide-timolol 2-0.5 % ophthalmic solution  Commonly known as: COSOPT   Administer 1  drop to both eyes 2 (Two) Times a Day.      latanoprost 0.005 % ophthalmic solution  Commonly known as: XALATAN   Administer 1 drop to both eyes Every Night.      losartan 25 MG tablet  Commonly known as: COZAAR   25 mg, Oral, Daily      metoprolol tartrate 25 MG tablet  Commonly known as: LOPRESSOR   25 mg, Oral, 2 Times Daily      multivitamin with minerals tablet tablet   1 tablet, Oral, Daily      EYE VITAMINS PO   1 tablet, Oral, Daily      rosuvastatin 10 MG tablet  Commonly known as: CRESTOR   Take 1 tablet by mouth Daily.             Stop These Medications      ALLERGY PO     amLODIPine 5 MG tablet  Commonly known as: NORVASC     vitamin C 250 MG tablet  Commonly known as: ASCORBIC ACID              Discharge Diet: Regular diet     Discharge Care Plan / Instructions: Please see the separately handed out cardiac surgery handout.  He will not be referred to cardiac rehab at this time due to recent sternotomy.  We will reassess at his postop follow-up visit.    Activity at Discharge:   No heavy lifting greater than 5 pounds or a gallon of milk while maintaining sternal precautions.  Atilio Skelton has been instructed on an exercise  regiment as detailed in a handed out cardiac surgery handout.    Tobacco:  The patient does not use tobacco products and therefore does not need tobacco cessation education.    BMI: BMI is >= 30 and <35. (Class 1 Obesity). The following options were offered after discussion;: referral to primary care      Follow-up Appointments: Atilio Skelton  is requested to see Zaheer Interiano within 1-2 weeks from time of discharge, to see Aster EDUARDO in 1 week, to follow-up with Dr. Christine in 6 weeks, and to follow-up with Dr. Tuttle of the cardiology service in 6 weeks.

## 2024-02-23 NOTE — PLAN OF CARE
Goal Outcome Evaluation:      Patient stands with SBA. Ambulated 260' with CGA. Balance improved. Safely climbed 10 steps with SBA. Patient is coughing up lots of phlem.

## 2024-02-23 NOTE — PLAN OF CARE
Goal Outcome Evaluation:      Patient stands from recliner and toilet with CGA. Ambulated 250' with CGA. Gait speed has improved and balance improved. Discussed importance of increasing distance walking. Reviewed sternal restrictions.

## 2024-02-24 ENCOUNTER — APPOINTMENT (OUTPATIENT)
Dept: GENERAL RADIOLOGY | Facility: HOSPITAL | Age: 69
DRG: 236 | End: 2024-02-24
Payer: MEDICARE

## 2024-02-24 LAB
ANION GAP SERPL CALCULATED.3IONS-SCNC: 13 MMOL/L (ref 5–15)
BUN SERPL-MCNC: 18 MG/DL (ref 8–23)
BUN/CREAT SERPL: 21.4 (ref 7–25)
CALCIUM SPEC-SCNC: 8.8 MG/DL (ref 8.6–10.5)
CHLORIDE SERPL-SCNC: 103 MMOL/L (ref 98–107)
CO2 SERPL-SCNC: 21 MMOL/L (ref 22–29)
CREAT SERPL-MCNC: 0.84 MG/DL (ref 0.76–1.27)
DEPRECATED RDW RBC AUTO: 50.5 FL (ref 37–54)
EGFRCR SERPLBLD CKD-EPI 2021: 95 ML/MIN/1.73
ERYTHROCYTE [DISTWIDTH] IN BLOOD BY AUTOMATED COUNT: 13.4 % (ref 12.3–15.4)
GLUCOSE BLDC GLUCOMTR-MCNC: 99 MG/DL (ref 70–130)
GLUCOSE SERPL-MCNC: 94 MG/DL (ref 65–99)
HCT VFR BLD AUTO: 39.8 % (ref 37.5–51)
HGB BLD-MCNC: 12.5 G/DL (ref 13–17.7)
MCH RBC QN AUTO: 31.9 PG (ref 26.6–33)
MCHC RBC AUTO-ENTMCNC: 31.4 G/DL (ref 31.5–35.7)
MCV RBC AUTO: 101.5 FL (ref 79–97)
PLATELET # BLD AUTO: 159 10*3/MM3 (ref 140–450)
PMV BLD AUTO: 11.3 FL (ref 6–12)
POTASSIUM SERPL-SCNC: 3.9 MMOL/L (ref 3.5–5.2)
RBC # BLD AUTO: 3.92 10*6/MM3 (ref 4.14–5.8)
SODIUM SERPL-SCNC: 137 MMOL/L (ref 136–145)
WBC NRBC COR # BLD AUTO: 9.23 10*3/MM3 (ref 3.4–10.8)

## 2024-02-24 PROCEDURE — 80048 BASIC METABOLIC PNL TOTAL CA: CPT | Performed by: NURSE PRACTITIONER

## 2024-02-24 PROCEDURE — 25010000002 FUROSEMIDE PER 20 MG: Performed by: NURSE PRACTITIONER

## 2024-02-24 PROCEDURE — 97116 GAIT TRAINING THERAPY: CPT

## 2024-02-24 PROCEDURE — 85027 COMPLETE CBC AUTOMATED: CPT | Performed by: NURSE PRACTITIONER

## 2024-02-24 PROCEDURE — 71045 X-RAY EXAM CHEST 1 VIEW: CPT

## 2024-02-24 PROCEDURE — 93005 ELECTROCARDIOGRAM TRACING: CPT | Performed by: SURGERY

## 2024-02-24 PROCEDURE — 25010000002 ENOXAPARIN PER 10 MG: Performed by: NURSE PRACTITIONER

## 2024-02-24 PROCEDURE — 93010 ELECTROCARDIOGRAM REPORT: CPT | Performed by: STUDENT IN AN ORGANIZED HEALTH CARE EDUCATION/TRAINING PROGRAM

## 2024-02-24 PROCEDURE — 82948 REAGENT STRIP/BLOOD GLUCOSE: CPT

## 2024-02-24 RX ADMIN — ACETAMINOPHEN 650 MG: 325 TABLET, FILM COATED ORAL at 13:08

## 2024-02-24 RX ADMIN — BISACODYL 10 MG: 5 TABLET, COATED ORAL at 08:19

## 2024-02-24 RX ADMIN — OXYCODONE HYDROCHLORIDE 5 MG: 5 TABLET ORAL at 23:16

## 2024-02-24 RX ADMIN — ACETAMINOPHEN 650 MG: 325 TABLET, FILM COATED ORAL at 06:22

## 2024-02-24 RX ADMIN — ENOXAPARIN SODIUM 40 MG: 100 INJECTION SUBCUTANEOUS at 08:18

## 2024-02-24 RX ADMIN — METHOCARBAMOL 500 MG: 500 TABLET, FILM COATED ORAL at 23:11

## 2024-02-24 RX ADMIN — METOPROLOL TARTRATE 25 MG: 25 TABLET, FILM COATED ORAL at 08:19

## 2024-02-24 RX ADMIN — ASPIRIN 81 MG: 81 TABLET, COATED ORAL at 08:18

## 2024-02-24 RX ADMIN — LATANOPROST 1 DROP: 50 SOLUTION/ DROPS OPHTHALMIC at 23:11

## 2024-02-24 RX ADMIN — LOSARTAN POTASSIUM 25 MG: 50 TABLET, FILM COATED ORAL at 08:19

## 2024-02-24 RX ADMIN — CLOPIDOGREL BISULFATE 75 MG: 75 TABLET, FILM COATED ORAL at 18:34

## 2024-02-24 RX ADMIN — FUROSEMIDE 20 MG: 10 INJECTION, SOLUTION INTRAVENOUS at 18:34

## 2024-02-24 RX ADMIN — METHOCARBAMOL 500 MG: 500 TABLET, FILM COATED ORAL at 06:24

## 2024-02-24 RX ADMIN — AMIODARONE HYDROCHLORIDE 400 MG: 200 TABLET ORAL at 23:11

## 2024-02-24 RX ADMIN — METOPROLOL TARTRATE 25 MG: 25 TABLET, FILM COATED ORAL at 23:11

## 2024-02-24 RX ADMIN — ATORVASTATIN CALCIUM 20 MG: 10 TABLET, FILM COATED ORAL at 23:11

## 2024-02-24 RX ADMIN — ACETAMINOPHEN 650 MG: 325 TABLET, FILM COATED ORAL at 18:34

## 2024-02-24 RX ADMIN — POTASSIUM CHLORIDE 20 MEQ: 750 CAPSULE, EXTENDED RELEASE ORAL at 08:18

## 2024-02-24 RX ADMIN — ACETAMINOPHEN 650 MG: 325 TABLET, FILM COATED ORAL at 00:53

## 2024-02-24 RX ADMIN — PANTOPRAZOLE SODIUM 40 MG: 40 TABLET, DELAYED RELEASE ORAL at 06:22

## 2024-02-24 RX ADMIN — TIMOLOL MALEATE: 5 SOLUTION/ DROPS OPHTHALMIC at 08:16

## 2024-02-24 RX ADMIN — FUROSEMIDE 20 MG: 10 INJECTION, SOLUTION INTRAVENOUS at 08:17

## 2024-02-24 RX ADMIN — POTASSIUM CHLORIDE 20 MEQ: 750 CAPSULE, EXTENDED RELEASE ORAL at 18:33

## 2024-02-24 RX ADMIN — METHOCARBAMOL 500 MG: 500 TABLET, FILM COATED ORAL at 13:08

## 2024-02-24 RX ADMIN — AMIODARONE HYDROCHLORIDE 400 MG: 200 TABLET ORAL at 08:19

## 2024-02-24 RX ADMIN — LIDOCAINE 2 PATCH: 4 PATCH TOPICAL at 08:17

## 2024-02-24 NOTE — PLAN OF CARE
Goal Outcome Evaluation:  Plan of Care Reviewed With: patient        Progress: improving  Outcome Evaluation: Pt. agreeable to therapy. He reports feeling better today. Pt. was CGA to stand and ambulate in hallway. He walked 350' with 1 standing rest. O2 sats remained in the high 90's while on RA. Pt. plans to return home tomorrow.

## 2024-02-24 NOTE — PLAN OF CARE
Goal Outcome Evaluation:           Progress: improving  Outcome Evaluation: VSS pt has been NSR to SA 60-84 with occasional PAC/PVCs per tele.  Denies pain or SOB at this time.  Occasional productive cough.  Will continue to monitor.

## 2024-02-24 NOTE — PLAN OF CARE
Goal Outcome Evaluation:  Plan of Care Reviewed With: patient        Progress: no change  Outcome Evaluation: Pt c/o incisional pain, prn PO pain medicine given with good relief. SB/S HR: 56-66 with pac on tele. Voiding per urinal.  Possible discharge home soon today or tomorrow. Safety maintained.

## 2024-02-24 NOTE — THERAPY TREATMENT NOTE
Acute Care - Physical Therapy Treatment Note   [place]     Patient Name: Atilio Skelton  : 1955  MRN: [id number]  Today's Date: 2024      Visit Dx:     ICD-10-CM ICD-9-CM   1. Impaired mobility [Z74.09]  Z74.09 799.89   2. Coronary artery disease involving native coronary artery of native heart with other form of angina pectoris  I25.118 414.01     413.9     Patient Active Problem List   Diagnosis    Coronary artery disease of native artery of native heart with stable angina pectoris    PVC (premature ventricular contraction)    Abnormal nuclear stress test    Coronary artery disease    CAD (coronary artery disease), native coronary artery    CAD (coronary artery disease)    Hyperlipidemia    Hypertension    Class 1 obesity with body mass index (BMI) of 33.0 to 33.9 in adult    Non-smoker     Past Medical History:   Diagnosis Date    Arrhythmia     Arthritis     Asthma     Cataracts, bilateral     Concussion     Glaucoma     Hyperlipidemia     Macular degeneration     Nasal fracture     Vaso-vagal reaction      Past Surgical History:   Procedure Laterality Date    APPENDECTOMY      CARDIAC CATHETERIZATION Left 2024    Procedure: Cardiac Catheterization/Vascular Study;  Surgeon: Ronnie Tuttle MD;  Location: [facility] CATH INVASIVE LOCATION;  Service: Cardiology;  Laterality: Left;    CORONARY ARTERY BYPASS GRAFT N/A 2024    Procedure: CORONARY ARTERY BYPASS GRAFTING X4, BILATERAL INTERNAL MAMMARY ARTERY GRAFT, LEFT LEG ENDOSCOPIC VEIN HARVEST, TRANSESOPHAGEAL ECHOCARDIOGRAM, XP STERNAL PLATING;  Surgeon: Jose Luis Christine MD;  Location: [facility] OR;  Service: Cardiothoracic;  Laterality: N/A;    EXTERNAL EAR SURGERY      GALLBLADDER SURGERY      LEG SURGERY      MOUTH SURGERY  2024    had 5 teeth removed    TONSILLECTOMY AND ADENOIDECTOMY      VASECTOMY       PT Assessment (last 12 hours)       PT Evaluation and Treatment       Row Name 24 1337 24 0947       Physical Therapy  Time and Intention    Subjective Information no complaints  -LY no complaints  -    Document Type therapy note (daily note)  -LY therapy note (daily note)  -    Mode of Treatment physical therapy  -LY physical therapy  -      Row Name 02/24/24 1337 02/24/24 0947       General Information    Existing Precautions/Restrictions fall;sternal  -LY fall;sternal  -      Row Name 02/24/24 1337 02/24/24 0947       Pain    Pretreatment Pain Rating 0/10 - no pain  -LY 0/10 - no pain  -    Posttreatment Pain Rating 0/10 - no pain  -LY 0/10 - no pain  -      Row Name 02/24/24 1337 02/24/24 0947       Bed Mobility    Comment, (Bed Mobility) in chair  -LY up in chair  -      Row Name 02/24/24 1337 02/24/24 0947       Sit-Stand Transfer    Sit-Stand Meigs (Transfers) standby assist  -LY verbal cues;standby assist;contact guard  -      Row Name 02/24/24 1337 02/24/24 0947       Stand-Sit Transfer    Stand-Sit Meigs (Transfers) standby assist  -LY verbal cues;standby assist;contact guard  -      Row Name 02/24/24 1337 02/24/24 0947       Gait/Stairs (Locomotion)    Meigs Level (Gait) verbal cues;standby assist  -LY verbal cues;contact guard  -    Distance in Feet (Gait) 500' with 1 standing rest break  -  -MF    Pattern (Gait) step-through  -LY --    Deviations/Abnormal Patterns (Gait) base of support, wide;shweta decreased  -LY shweta decreased;stride length decreased  1 standing rest  -MF    Meigs Level (Stairs) verbal cues;stand by assist  -LY --    Handrail Location (Stairs) none  -LY --    Number of Steps (Stairs) 10  -LY --    Ascending Technique (Stairs) step-to-step  -LY --    Descending Technique (Stairs) step-to-step  -LY --      Row Name 02/24/24 1337 02/24/24 0947       Motor Skills    Comments, Therapeutic Exercise cardiac protocol x15  -LY cardiac warm ups  -      Row Name             Wound 02/20/24 0812 midline sternal Incision    Wound - Properties Group  Placement Date: 02/20/24  -TJ Placement Time: 0812 -TJ Orientation: midline  -TJ Location: sternal  -TJ Primary Wound Type: Incision  -TJ    Retired Wound - Properties Group Placement Date: 02/20/24  -TJ Placement Time: 0812 -TJ Orientation: midline  -TJ Location: sternal  -TJ Primary Wound Type: Incision  -TJ    Retired Wound - Properties Group Date first assessed: 02/20/24  -TJ Time first assessed: 0812 -TJ Location: sternal  -TJ Primary Wound Type: Incision  -TJ      Row Name             Wound 02/20/24 0810 Left leg Incision    Wound - Properties Group Placement Date: 02/20/24  -TJ Placement Time: 0810  -TJ Side: Left  -TJ Location: leg  -TJ Primary Wound Type: Incision  -TJ    Retired Wound - Properties Group Placement Date: 02/20/24  -TJ Placement Time: 0810  -TJ Side: Left  -TJ Location: leg  -TJ Primary Wound Type: Incision  -TJ    Retired Wound - Properties Group Date first assessed: 02/20/24  -TJ Time first assessed: 0810  -TJ Side: Left  -TJ Location: leg  -TJ Primary Wound Type: Incision  -TJ      Row Name 02/24/24 0947          Plan of Care Review    Plan of Care Reviewed With patient  -MF     Progress improving  -MF     Outcome Evaluation Pt. agreeable to therapy. He reports feeling better today. Pt. was CGA to stand and ambulate in hallway. He walked 350' with 1 standing rest. O2 sats remained in the high 90's while on RA. Pt. plans to return home tomorrow.  -       Row Name 02/24/24 1337 02/24/24 0947       Positioning and Restraints    Pre-Treatment Position sitting in chair/recliner  -LY sitting in chair/recliner  -MF    Post Treatment Position chair  -LY chair  -MF    In Chair reclined;call light within reach;encouraged to call for assist;with family/caregiver  -LY reclined;call light within reach;encouraged to call for assist;MYRA elevated;AVIS elevated  -              User Key  (r) = Recorded By, (t) = Taken By, (c) = Cosigned By      Initials Name Provider Type    Mia Mcmillan,  PTA Physical Therapist Assistant    Miesha Templeton, PTA Physical Therapist Assistant    Nadeen Hernandez, RN Registered Nurse                    Physical Therapy Education       Title: PT OT SLP Therapies (In Progress)       Topic: Physical Therapy (In Progress)       Point: Mobility training (In Progress)       Learning Progress Summary             Patient Acceptance, E,D, NR by BEATRIZ at 2/22/2024 1037    Comment: gait training    Acceptance, E, VU,DU,NR by TUCKER at 2/21/2024 0855    Comment: benefits of activity, progression of PT, sternal prec                         Point: Home exercise program (Not Started)       Learner Progress:  Not documented in this visit.              Point: Body mechanics (Not Started)       Learner Progress:  Not documented in this visit.              Point: Precautions (In Progress)       Learning Progress Summary             Patient Acceptance, E,D, NR by BEATRIZ at 2/23/2024 1038    Comment: sternal restrictions    Acceptance, E, VU,DU,NR by TUCKER at 2/21/2024 0855    Comment: benefits of activity, progression of PT, sternal prec                                         User Key       Initials Effective Dates Name Provider Type Discipline    TUCKER 02/03/23 -  Warren Tolentino, PT DPT Physical Therapist PT    BEATRIZ 02/03/23 -  Aracelis Gandhi PTA Physical Therapist Assistant PT                  PT Recommendation and Plan     Plan of Care Reviewed With: patient  Progress: improving  Outcome Evaluation: Pt. agreeable to therapy. He reports feeling better today. Pt. was CGA to stand and ambulate in hallway. He walked 350' with 1 standing rest. O2 sats remained in the high 90's while on RA. Pt. plans to return home tomorrow.   Outcome Measures       Row Name 02/24/24 0947 02/23/24 1000 02/22/24 1000       How much help from another person do you currently need...    Turning from your back to your side while in flat bed without using bedrails? 3  -MF 3  -BEATRIZ 2  -BEATRIZ    Moving from lying on back to  sitting on the side of a flat bed without bedrails? 3  -MF 3  -BEATRIZ 2  -BEATRIZ    Moving to and from a bed to a chair (including a wheelchair)? 3  -MF 3  -BEATRIZ 3  -BEATRIZ    Standing up from a chair using your arms (e.g., wheelchair, bedside chair)? 3  -MF 4  no arms  -BEATRIZ 3  -BEATRIZ    Climbing 3-5 steps with a railing? 3  -MF 3  -BEATRIZ 3  -BEATRIZ    To walk in hospital room? 3  -MF 3  -BEATRIZ 3  -BEATRIZ    AM-PAC 6 Clicks Score (PT) 18  -MF 19  -BEATRIZ 16  -BEATRIZ    Highest Level of Mobility Goal 6 --> Walk 10 steps or more  -MF 6 --> Walk 10 steps or more  -BEATRIZ 5 --> Static standing  -BEATRIZ       Functional Assessment    Outcome Measure Options AM-PAC 6 Clicks Basic Mobility (PT)  - -- --              User Key  (r) = Recorded By, (t) = Taken By, (c) = Cosigned By      Initials Name Provider Type    BEATRIZ Aracelis Gandhi, PTA Physical Therapist Assistant    Mia Mcmillan, ALYCIA Physical Therapist Assistant                     Time Calculation:    PT Charges       Row Name 02/24/24 1613 02/24/24 1411          Time Calculation    Start Time 0947  -MF 1337  -LY     Stop Time 1015  -MF 1406  -LY     Time Calculation (min) 28 min  -MF 29 min  -LY     PT Received On -- 02/24/24  -LY        Time Calculation- PT    Total Timed Code Minutes- PT 28 minute(s)  -MF 29 minute(s)  -LY        Timed Charges    80164 - Gait Training Minutes  28  -MF 29  -LY        Total Minutes    Timed Charges Total Minutes 28  -MF 29  -LY      Total Minutes 28  -MF 29  -LY               User Key  (r) = Recorded By, (t) = Taken By, (c) = Cosigned By      Initials Name Provider Type     Mia Cooper PTA Physical Therapist Assistant    Miesha Templeton, ALYCIA Physical Therapist Assistant                  Therapy Charges for Today       Code Description Service Date Service Provider Modifiers Qty    [id number] HC GAIT TRAINING EA 15 MIN 2/24/2024 Mia Cooper PTA GP 2            PT G-Codes  Outcome Measure Options: AM-PAC 6 Clicks Basic Mobility (PT)  AM-PAC 6  Clicks Score (PT): 18    Mia Cooper, PTA  2/24/2024

## 2024-02-24 NOTE — PROGRESS NOTES
"Patient name: Atilio Skelton  Patient : 1955  VISIT # [id number]  MR #[id number]    Procedure:Procedure(s):  CORONARY ARTERY BYPASS GRAFTING X4, BILATERAL INTERNAL MAMMARY ARTERY GRAFT, LEFT LEG ENDOSCOPIC VEIN HARVEST, TRANSESOPHAGEAL ECHOCARDIOGRAM, XP STERNAL PLATING  Procedure Date:2024  POD:4 Days Post-Op    Subjective       Resting in recliner.  Remains on room air.  Complained of wheezing, clear to auscultation and no concerning findings on chest x-ray.  Labs are stable documented below, no new concerns.  Weight is down 5 pounds, remains 2 pounds above baseline.  Mild bilateral lower extremity edema.  Obtaining 1000 mL with incentive spirometer.  Has had several bowel movements.  Ambulated 250 feet with physical therapy.        Telemetry: Sinus bradycardia-sinus rhythm 56-74  IV drips: None       Objective     Visit Vitals  /68 (BP Location: Left arm, Patient Position: Sitting)   Pulse 62   Temp 97.9 °F (36.6 °C) (Oral)   Resp 16   Ht 177.5 cm (69.88\")   Wt 106 kg (234 lb 6.4 oz)   SpO2 98%   BMI 33.75 kg/m²   Baseline weight 232 pounds    Intake/Output Summary (Last 24 hours) at 2024 0830  Last data filed at 2024 0500  Gross per 24 hour   Intake 360 ml   Output 3050 ml   Net -2690 ml       Lab:     CBC:  Results from last 7 days   Lab Units 24  0219 24  0347 24  0351   WBC 10*3/mm3 9.23 13.33* 14.56*   HEMATOCRIT % 39.8 40.5 38.2   PLATELETS 10*3/mm3 159 132* 128*          BMP:  Results from last 7 days   Lab Units 24  0219 24  0347 24  0351   SODIUM mmol/L 137 136 136   POTASSIUM mmol/L 3.9 3.8 4.0   CHLORIDE mmol/L 103 102 100   CO2 mmol/L 21.0* 23.0 22.0   GLUCOSE mg/dL 94 113* 130*   BUN mg/dL 18 18 18   CREATININE mg/dL 0.84 0.87 0.99          COAG:  Results from last 7 days   Lab Units 24  0215 24  1248   INR  1.16* 1.26*   APTT seconds  --  37.2*       IMAGES:       Imaging Results (Last 24 Hours)       Procedure " Component Value Units Date/Time    XR Chest 1 View [[id number]] Collected: 02/24/24 0758     Updated: 02/24/24 0802    Narrative:      EXAMINATION: XR CHEST 1 VW-     2/24/2024 2:15 AM     HISTORY: Post-Op Heart Surgery     1 view chest x-ray.     COMPARISON:  Yesterday at 4:45 a.m.     Stable heart size.     Low lung volumes.     Interval mediastinal/chest tube removal.     No significant pneumothorax.     The lungs are essentially clear.       Impression:      1. Normal postoperative appearance of the chest.           This report was signed and finalized on 2/24/2024 7:59 AM by Dr. Galo Verma MD.             CXR: No pneumothorax.  Mild bilateral atelectasis.  Postoperative changes.      Physical Exam:  General: Alert, oriented. No apparent distress.  Obese  Cardiovascular: Regular rate and rhythm without murmur, rubs, or gallops.    Pulmonary: Clear to auscultation bilaterally without wheezing, rubs, or rales.  Chest: Sternotomy incision clean, dry, and intact. Sternum stable. No clicks.   Abdomen: Soft, slightly distended, and nontender.  Extremities: Warm, moves all extremities.  Mild lower extremity edema. Saphenectomy site clean, dry, and intact.  Neurologic:  Grossly intact with no focal deficits.            Impression:  Coronary artery disease with stable angina  Hyperlipidemia, on statin  Hypertension, well-controlled  Obesity, BMI 33  Non-smoker  Paroxysmal atrial fibrillation, now sinus rhythm        Plan:  Continue bowel regimen  Continue diuresis  Routine postcardiac surgery care  Encourage pulmonary toilet and ambulation    Anticipate discharge home tomorrow  Discussed with patient and nursing      ALESHA Khan  02/24/24  08:30 CST

## 2024-02-24 NOTE — THERAPY TREATMENT NOTE
Acute Care - Physical Therapy Treatment Note   [facility]     Patient Name: Atilio Skelton  : 1955  MRN: [id number]  Today's Date: 2024      Visit Dx:     ICD-10-CM ICD-9-CM   1. Impaired mobility [Z74.09]  Z74.09 799.89   2. Coronary artery disease involving native coronary artery of native heart with other form of angina pectoris  I25.118 414.01     413.9     Patient Active Problem List   Diagnosis    Coronary artery disease of native artery of native heart with stable angina pectoris    PVC (premature ventricular contraction)    Abnormal nuclear stress test    Coronary artery disease    CAD (coronary artery disease), native coronary artery    CAD (coronary artery disease)    Hyperlipidemia    Hypertension    Class 1 obesity with body mass index (BMI) of 33.0 to 33.9 in adult    Non-smoker     Past Medical History:   Diagnosis Date    Arrhythmia     Arthritis     Asthma     Cataracts, bilateral     Concussion     Glaucoma     Hyperlipidemia     Macular degeneration     Nasal fracture     Vaso-vagal reaction      Past Surgical History:   Procedure Laterality Date    APPENDECTOMY      CARDIAC CATHETERIZATION Left 2024    Procedure: Cardiac Catheterization/Vascular Study;  Surgeon: Ronnie Tuttle MD;  Location: [facility] CATH INVASIVE LOCATION;  Service: Cardiology;  Laterality: Left;    CORONARY ARTERY BYPASS GRAFT N/A 2024    Procedure: CORONARY ARTERY BYPASS GRAFTING X4, BILATERAL INTERNAL MAMMARY ARTERY GRAFT, LEFT LEG ENDOSCOPIC VEIN HARVEST, TRANSESOPHAGEAL ECHOCARDIOGRAM, XP STERNAL PLATING;  Surgeon: Jose Luis Christine MD;  Location: [facility] OR;  Service: Cardiothoracic;  Laterality: N/A;    EXTERNAL EAR SURGERY      GALLBLADDER SURGERY      LEG SURGERY      MOUTH SURGERY  2024    had 5 teeth removed    TONSILLECTOMY AND ADENOIDECTOMY      VASECTOMY       PT Assessment (last 12 hours)       PT Evaluation and Treatment       Row Name 24 1337 24 0947       Physical Therapy  Time and Intention    Subjective Information no complaints  -LY --    Document Type therapy note (daily note)  -LY therapy note (daily note)  -    Mode of Treatment physical therapy  -LY physical therapy  -MF      Row Name 02/24/24 1337          General Information    Existing Precautions/Restrictions fall;sternal  -LY       Row Name 02/24/24 1337          Pain    Pretreatment Pain Rating 0/10 - no pain  -LY     Posttreatment Pain Rating 0/10 - no pain  -LY       Row Name 02/24/24 1337          Bed Mobility    Comment, (Bed Mobility) in chair  -LY       Row Name 02/24/24 1337          Sit-Stand Transfer    Sit-Stand [place] (Transfers) standby assist  -LY       Row Name 02/24/24 1337          Stand-Sit Transfer    Stand-Sit [place] (Transfers) standby assist  -LY       Row Name 02/24/24 1337          Gait/Stairs (Locomotion)    [place] Level (Gait) verbal cues;standby assist  -LY     Distance in Feet (Gait) 500' with 1 standing rest break  -LY     Pattern (Gait) step-through  -LY     Deviations/Abnormal Patterns (Gait) base of support, wide;shweta decreased  -LY     [place] Level (Stairs) verbal cues;stand by assist  -LY     Handrail Location (Stairs) none  -LY     Number of Steps (Stairs) 10  -LY     Ascending Technique (Stairs) step-to-step  -LY     Descending Technique (Stairs) step-to-step  -LY       Row Name 02/24/24 1337          Motor Skills    Comments, Therapeutic Exercise cardiac protocol x15  -LY       Row Name             Wound 02/20/24 0812 midline sternal Incision    Wound - Properties Group Placement Date: 02/20/24  -TJ Placement Time: 0812 -TJ Orientation: midline  -TJ Location: sternal  -TJ Primary Wound Type: Incision  -TJ    Retired Wound - Properties Group Placement Date: 02/20/24  -TJ Placement Time: 0812  -TJ Orientation: midline  -TJ Location: sternal  -TJ Primary Wound Type: Incision  -TJ    Retired Wound - Properties Group Date first assessed: 02/20/24  -TJ Time first  assessed: 0812  -TJ Location: sternal  -TJ Primary Wound Type: Incision  -TJ      Row Name             Wound 02/20/24 0810 Left leg Incision    Wound - Properties Group Placement Date: 02/20/24  -TJ Placement Time: 0810  -TJ Side: Left  -TJ Location: leg  -TJ Primary Wound Type: Incision  -TJ    Retired Wound - Properties Group Placement Date: 02/20/24  -TJ Placement Time: 0810  -TJ Side: Left  -TJ Location: leg  -TJ Primary Wound Type: Incision  -TJ    Retired Wound - Properties Group Date first assessed: 02/20/24  -TJ Time first assessed: 0810  -TJ Side: Left  -TJ Location: leg  -TJ Primary Wound Type: Incision  -TJ      Row Name 02/24/24 1337          Positioning and Restraints    Pre-Treatment Position sitting in chair/recliner  -LY     Post Treatment Position chair  -LY     In Chair reclined;call light within reach;encouraged to call for assist;with family/caregiver  -LY               User Key  (r) = Recorded By, (t) = Taken By, (c) = Cosigned By      Initials Name Provider Type    Mia Mcmillan, PTA Physical Therapist Assistant    Miesha Templeton PTA Physical Therapist Assistant    Nadeen Hernandez, RN Registered Nurse                    Physical Therapy Education       Title: PT OT SLP Therapies (In Progress)       Topic: Physical Therapy (In Progress)       Point: Mobility training (In Progress)       Learning Progress Summary             Patient Acceptance, E,D, NR by BEATRIZ at 2/22/2024 1037    Comment: gait training    Acceptance, E, VU,DU,NR by TUCKER at 2/21/2024 0855    Comment: benefits of activity, progression of PT, sternal prec                         Point: Home exercise program (Not Started)       Learner Progress:  Not documented in this visit.              Point: Body mechanics (Not Started)       Learner Progress:  Not documented in this visit.              Point: Precautions (In Progress)       Learning Progress Summary             Patient Acceptance, E,D, NR by BEATRIZ at 2/23/2024 1038     Comment: sternal restrictions    Sparkle, E, NIGEL TORRES,NR by TUCKER at 2/21/2024 0871    Comment: benefits of activity, progression of PT, sternal prec                                         User Key       Initials Effective Dates Name Provider Type Discipline    TUCKER 02/03/23 -  Warren Tolentino, PT DPT Physical Therapist PT    BEATRIZ 02/03/23 -  Aracelis Gandhi, PTA Physical Therapist Assistant PT                  PT Recommendation and Plan  Anticipated Discharge Disposition (PT): home with assist      Outcome Measures       Row Name 02/23/24 1000 02/22/24 1000          How much help from another person do you currently need...    Turning from your back to your side while in flat bed without using bedrails? 3  -BEATRIZ 2  -BEATRIZ     Moving from lying on back to sitting on the side of a flat bed without bedrails? 3  -BEATRIZ 2  -BEATRIZ     Moving to and from a bed to a chair (including a wheelchair)? 3  -BEATRIZ 3  -BEATRIZ     Standing up from a chair using your arms (e.g., wheelchair, bedside chair)? 4  no arms  -BEATRIZ 3  -BEATRIZ     Climbing 3-5 steps with a railing? 3  -BEATRIZ 3  -BEATRIZ     To walk in hospital room? 3  -BEATRIZ 3  -BEATRIZ     AM-PAC 6 Clicks Score (PT) 19  -BEATRIZ 16  -BEATRIZ     Highest Level of Mobility Goal 6 --> Walk 10 steps or more  -BEATRIZ 5 --> Static standing  -BEATRIZ               User Key  (r) = Recorded By, (t) = Taken By, (c) = Cosigned By      Initials Name Provider Type    BEATRIZ Aracelis Gandhi, ALYCIA Physical Therapist Assistant                     Time Calculation:    PT Charges       Row Name 02/24/24 1411             Time Calculation    Start Time 1337  -LY      Stop Time 1406  -LY      Time Calculation (min) 29 min  -LY      PT Received On 02/24/24  -LY         Time Calculation- PT    Total Timed Code Minutes- PT 29 minute(s)  -LY         Timed Charges    48616 - Gait Training Minutes  29  -LY         Total Minutes    Timed Charges Total Minutes 29  -LY       Total Minutes 29  -LY                User Key  (r) = Recorded By, (t) = Taken By, (c) =  Cosigned By      Initials Name Provider Type    LY Miesha Cassidy PTA Physical Therapist Assistant                  Therapy Charges for Today       Code Description Service Date Service Provider Modifiers Qty    [id number] HC GAIT TRAINING EA 15 MIN 2/24/2024 Miesha Cassidy PTA GP 2            PT G-Codes  Outcome Measure Options: AM-PAC 6 Clicks Basic Mobility (PT)  AM-PAC 6 Clicks Score (PT): 18    Miesha Cassidy PTA  2/24/2024

## 2024-02-25 ENCOUNTER — APPOINTMENT (OUTPATIENT)
Dept: GENERAL RADIOLOGY | Facility: HOSPITAL | Age: 69
DRG: 236 | End: 2024-02-25
Payer: MEDICARE

## 2024-02-25 VITALS
HEIGHT: 70 IN | HEART RATE: 87 BPM | RESPIRATION RATE: 18 BRPM | TEMPERATURE: 98.1 F | DIASTOLIC BLOOD PRESSURE: 88 MMHG | SYSTOLIC BLOOD PRESSURE: 156 MMHG | WEIGHT: 232.8 LBS | BODY MASS INDEX: 33.33 KG/M2 | OXYGEN SATURATION: 97 %

## 2024-02-25 LAB
ANION GAP SERPL CALCULATED.3IONS-SCNC: 11 MMOL/L (ref 5–15)
BUN SERPL-MCNC: 18 MG/DL (ref 8–23)
BUN/CREAT SERPL: 17.8 (ref 7–25)
CALCIUM SPEC-SCNC: 8.2 MG/DL (ref 8.6–10.5)
CHLORIDE SERPL-SCNC: 105 MMOL/L (ref 98–107)
CO2 SERPL-SCNC: 24 MMOL/L (ref 22–29)
CREAT SERPL-MCNC: 1.01 MG/DL (ref 0.76–1.27)
DEPRECATED RDW RBC AUTO: 47 FL (ref 37–54)
EGFRCR SERPLBLD CKD-EPI 2021: 81 ML/MIN/1.73
ERYTHROCYTE [DISTWIDTH] IN BLOOD BY AUTOMATED COUNT: 13.4 % (ref 12.3–15.4)
GLUCOSE SERPL-MCNC: 104 MG/DL (ref 65–99)
HCT VFR BLD AUTO: 39 % (ref 37.5–51)
HGB BLD-MCNC: 12.7 G/DL (ref 13–17.7)
MCH RBC QN AUTO: 31.2 PG (ref 26.6–33)
MCHC RBC AUTO-ENTMCNC: 32.6 G/DL (ref 31.5–35.7)
MCV RBC AUTO: 95.8 FL (ref 79–97)
PLATELET # BLD AUTO: 191 10*3/MM3 (ref 140–450)
PMV BLD AUTO: 10.2 FL (ref 6–12)
POTASSIUM SERPL-SCNC: 3.4 MMOL/L (ref 3.5–5.2)
QT INTERVAL: 508 MS
QTC INTERVAL: 494 MS
RBC # BLD AUTO: 4.07 10*6/MM3 (ref 4.14–5.8)
SODIUM SERPL-SCNC: 140 MMOL/L (ref 136–145)
WBC NRBC COR # BLD AUTO: 7.23 10*3/MM3 (ref 3.4–10.8)

## 2024-02-25 PROCEDURE — 80048 BASIC METABOLIC PNL TOTAL CA: CPT | Performed by: NURSE PRACTITIONER

## 2024-02-25 PROCEDURE — 25010000002 FUROSEMIDE PER 20 MG: Performed by: NURSE PRACTITIONER

## 2024-02-25 PROCEDURE — 25010000002 ENOXAPARIN PER 10 MG: Performed by: NURSE PRACTITIONER

## 2024-02-25 PROCEDURE — 85027 COMPLETE CBC AUTOMATED: CPT | Performed by: NURSE PRACTITIONER

## 2024-02-25 PROCEDURE — 71045 X-RAY EXAM CHEST 1 VIEW: CPT

## 2024-02-25 RX ORDER — AMIODARONE HYDROCHLORIDE 400 MG/1
400 TABLET ORAL DAILY
Qty: 7 TABLET | Refills: 0 | Status: SHIPPED | OUTPATIENT
Start: 2024-02-25 | End: 2024-03-03

## 2024-02-25 RX ORDER — PANTOPRAZOLE SODIUM 40 MG/1
40 TABLET, DELAYED RELEASE ORAL
Qty: 30 TABLET | Refills: 0 | Status: SHIPPED | OUTPATIENT
Start: 2024-02-25

## 2024-02-25 RX ORDER — POTASSIUM CHLORIDE 750 MG/1
20 CAPSULE, EXTENDED RELEASE ORAL DAILY
Qty: 5 CAPSULE | Refills: 0 | Status: SHIPPED | OUTPATIENT
Start: 2024-02-25

## 2024-02-25 RX ORDER — FUROSEMIDE 20 MG/1
20 TABLET ORAL DAILY
Qty: 5 TABLET | Refills: 0 | Status: SHIPPED | OUTPATIENT
Start: 2024-02-25

## 2024-02-25 RX ORDER — HYDROCODONE BITARTRATE AND ACETAMINOPHEN 5; 325 MG/1; MG/1
1 TABLET ORAL EVERY 6 HOURS PRN
Qty: 25 TABLET | Refills: 0 | Status: SHIPPED | OUTPATIENT
Start: 2024-02-25

## 2024-02-25 RX ORDER — CLOPIDOGREL BISULFATE 75 MG/1
75 TABLET ORAL DAILY
Qty: 30 TABLET | Refills: 2 | Status: SHIPPED | OUTPATIENT
Start: 2024-02-25

## 2024-02-25 RX ADMIN — ACETAMINOPHEN 650 MG: 325 TABLET, FILM COATED ORAL at 00:41

## 2024-02-25 RX ADMIN — ASPIRIN 81 MG: 81 TABLET, COATED ORAL at 08:31

## 2024-02-25 RX ADMIN — ENOXAPARIN SODIUM 40 MG: 100 INJECTION SUBCUTANEOUS at 08:31

## 2024-02-25 RX ADMIN — METHOCARBAMOL 500 MG: 500 TABLET, FILM COATED ORAL at 06:18

## 2024-02-25 RX ADMIN — FUROSEMIDE 20 MG: 10 INJECTION, SOLUTION INTRAVENOUS at 08:32

## 2024-02-25 RX ADMIN — PANTOPRAZOLE SODIUM 40 MG: 40 TABLET, DELAYED RELEASE ORAL at 06:18

## 2024-02-25 RX ADMIN — AMIODARONE HYDROCHLORIDE 400 MG: 200 TABLET ORAL at 08:31

## 2024-02-25 RX ADMIN — LOSARTAN POTASSIUM 25 MG: 50 TABLET, FILM COATED ORAL at 08:31

## 2024-02-25 RX ADMIN — METOPROLOL TARTRATE 25 MG: 25 TABLET, FILM COATED ORAL at 08:31

## 2024-02-25 RX ADMIN — ACETAMINOPHEN 650 MG: 325 TABLET, FILM COATED ORAL at 06:17

## 2024-02-25 RX ADMIN — LIDOCAINE 2 PATCH: 4 PATCH TOPICAL at 08:32

## 2024-02-25 RX ADMIN — POTASSIUM CHLORIDE 20 MEQ: 750 CAPSULE, EXTENDED RELEASE ORAL at 08:31

## 2024-02-25 RX ADMIN — TIMOLOL MALEATE: 5 SOLUTION/ DROPS OPHTHALMIC at 08:33

## 2024-02-25 NOTE — PROGRESS NOTES
"Patient name: Atilio Skelton  Patient : 1955  VISIT # [id number]  MR #[id number]    Procedure:Procedure(s):  CORONARY ARTERY BYPASS GRAFTING X4, BILATERAL INTERNAL MAMMARY ARTERY GRAFT, LEFT LEG ENDOSCOPIC VEIN HARVEST, TRANSESOPHAGEAL ECHOCARDIOGRAM, XP STERNAL PLATING  Procedure Date:2024  POD:5 Days Post-Op    Subjective       Resting in recliner.  Remains on room air with SpO2 of 97%.  Labs remain within acceptable limits, documented below.  Weight is down 2 pounds and he is back to baseline weight of 232 pounds.  Reports has had a good bowel movement.  Reports pain is overall controlled.  Ambulating well with physical therapy to include using stairs.    Telemetry: Sinus 60-87  IV drips: None       Objective     Visit Vitals  /68 (BP Location: Right arm, Patient Position: Lying)   Pulse 76   Temp 97.8 °F (36.6 °C) (Oral)   Resp 18   Ht 177.5 cm (69.88\")   Wt 106 kg (232 lb 12.8 oz)   SpO2 98%   BMI 33.52 kg/m²   Baseline weight 232 pounds    Intake/Output Summary (Last 24 hours) at 2024 0801  Last data filed at 2024 0605  Gross per 24 hour   Intake 740 ml   Output 1500 ml   Net -760 ml       Lab:     CBC:  Results from last 7 days   Lab Units 24  0239 24  0219 24  0347   WBC 10*3/mm3 7.23 9.23 13.33*   HEMATOCRIT % 39.0 39.8 40.5   PLATELETS 10*3/mm3 191 159 132*          BMP:  Results from last 7 days   Lab Units 24  0239 24  0219 24  0347   SODIUM mmol/L 140 137 136   POTASSIUM mmol/L 3.4* 3.9 3.8   CHLORIDE mmol/L 105 103 102   CO2 mmol/L 24.0 21.0* 23.0   GLUCOSE mg/dL 104* 94 113*   BUN mg/dL 18 18 18   CREATININE mg/dL 1.01 0.84 0.87          COAG:  Results from last 7 days   Lab Units 24  0215 24  1248   INR  1.16* 1.26*   APTT seconds  --  37.2*       IMAGES:       Imaging Results (Last 24 Hours)       Procedure Component Value Units Date/Time    XR Chest 1 View [[id number]] Collected: 24 0731     Updated: 24 " 0735    Narrative:      EXAMINATION: XR CHEST 1 VW-     2/25/2024 2:20 AM     HISTORY: Post-Op Heart Surgery     1 view chest x-ray.     COMPARISON:  Yesterday at 3:43 a.m.     Increased LEFT basilar atelectasis.     Stable heart size.     No pneumothorax or heart failure.       Impression:      1. Increased LEFT basilar atelectasis.           This report was signed and finalized on 2/25/2024 7:32 AM by Dr. Galo Verma MD.       XR Chest 1 View [[id number]] Collected: 02/24/24 0758     Updated: 02/24/24 0802    Narrative:      EXAMINATION: XR CHEST 1 VW-     2/24/2024 2:15 AM     HISTORY: Post-Op Heart Surgery     1 view chest x-ray.     COMPARISON:  Yesterday at 4:45 a.m.     Stable heart size.     Low lung volumes.     Interval mediastinal/chest tube removal.     No significant pneumothorax.     The lungs are essentially clear.       Impression:      1. Normal postoperative appearance of the chest.           This report was signed and finalized on 2/24/2024 7:59 AM by Dr. Galo Verma MD.             CXR: Postoperative changes.  No pneumothorax.  Poor inspiration.      Physical Exam: No change  General: Alert, oriented. No apparent distress.  Obese  Cardiovascular: Regular rate and rhythm without murmur, rubs, or gallops.    Pulmonary: Clear to auscultation bilaterally without wheezing, rubs, or rales.  Chest: Sternotomy incision clean, dry, and intact. Sternum stable. No clicks.   Abdomen: Soft, slightly distended, and nontender.  Extremities: Warm, moves all extremities.  Mild lower extremity edema. Saphenectomy site clean, dry, and intact.  Neurologic:  Grossly intact with no focal deficits.            Impression:  Coronary artery disease with stable angina  Hyperlipidemia, on statin  Hypertension, well-controlled  Obesity, BMI 33  Non-smoker  Paroxysmal atrial fibrillation, now sinus rhythm        Plan:  Continue diuresis post discharge  Routine postcardiac surgery care  Encourage pulmonary toilet and  ambulation  Encouraged the use of continuing the incentive spirometer hourly while awake    Okay to discharge home, medications will be sent to [facility]  Will follow-up with ALESHA Arriaga in 1 week and with Dr. Christine in 6 weeks, clinic will contact patient on Monday with appointment time and dates.    Discussed with patient and nursing      ALESHA Khan  02/25/24  08:01 CST

## 2024-02-25 NOTE — PLAN OF CARE
Goal Outcome Evaluation:  Plan of Care Reviewed With: patient        Progress: improving  Outcome Evaluation: S 62-85 with PVCs on tele. VSS. Pt ambulated this am. Pt c/o pain X 1. PRN pain medication given with relief per pt report. Pt rested well this shift. Urinal at bedside. Possible discharge today. Safety maintained.

## 2024-02-25 NOTE — THERAPY DISCHARGE NOTE
Acute Care - Physical Therapy Discharge Summary   [place]       Patient Name: Atilio Skelton  : 1955  MRN: [id number]    Today's Date: 2024                 Admit Date: 2024      PT Recommendation and Plan    Visit Dx:    ICD-10-CM ICD-9-CM   1. Impaired mobility [Z74.09]  Z74.09 799.89   2. Coronary artery disease involving native coronary artery of native heart with other form of angina pectoris  I25.118 414.01     413.9   3. Coronary artery disease involving native coronary artery of native heart with unstable angina pectoris  I25.110 414.01     411.1        Outcome Measures       Row Name 24 0947 24 1000          How much help from another person do you currently need...    Turning from your back to your side while in flat bed without using bedrails? 3  -MF 3  -BEATRIZ     Moving from lying on back to sitting on the side of a flat bed without bedrails? 3  -MF 3  -BEATRIZ     Moving to and from a bed to a chair (including a wheelchair)? 3  -MF 3  -BEATRIZ     Standing up from a chair using your arms (e.g., wheelchair, bedside chair)? 3  -MF 4  no arms  -BEATRIZ     Climbing 3-5 steps with a railing? 3  -MF 3  -BEATRIZ     To walk in hospital room? 3  -MF 3  -BEATRIZ     AM-PAC 6 Clicks Score (PT) 18  -MF 19  -BEATRIZ     Highest Level of Mobility Goal 6 --> Walk 10 steps or more  - 6 --> Walk 10 steps or more  -BEATRIZ        Functional Assessment    Outcome Measure Options AM-PAC 6 Clicks Basic Mobility (PT)  - --               User Key  (r) = Recorded By, (t) = Taken By, (c) = Cosigned By      Initials Name Provider Type    Aracelis Tan PTA Physical Therapist Assistant    Mia Mcmillan PTA Physical Therapist Assistant                         PT Rehab Goals       Row Name 24 1547             Bed Mobility Goal 1 (PT)    Activity/Assistive Device (Bed Mobility Goal 1, PT) sit to supine/supine to sit  -AH      [place] Level/Cues Needed (Bed Mobility Goal 1, PT) supervision required   -AH      Time Frame (Bed Mobility Goal 1, PT) long term goal (LTG);10 days  -AH      Progress/Outcomes (Bed Mobility Goal 1, PT) goal met  -AH         Transfer Goal 1 (PT)    Activity/Assistive Device (Transfer Goal 1, PT) sit-to-stand/stand-to-sit;bed-to-chair/chair-to-bed  -AH      [place] Level/Cues Needed (Transfer Goal 1, PT) supervision required  -AH      Time Frame (Transfer Goal 1, PT) long term goal (LTG);10 days  -AH      Progress/Outcome (Transfer Goal 1, PT) goal met  -         Gait Training Goal 1 (PT)    Activity/Assistive Device (Gait Training Goal 1, PT) gait (walking locomotion);decrease fall risk;improve balance and speed;increase endurance/gait distance;increase energy conservation  -      [place] Level (Gait Training Goal 1, PT) supervision required  -      Distance (Gait Training Goal 1, PT) 250 ft  -      Time Frame (Gait Training Goal 1, PT) long term goal (LTG);10 days  -AH      Progress/Outcome (Gait Training Goal 1, PT) goal met  -         Stairs Goal 1 (PT)    Activity/Assistive Device (Stairs Goal 1, PT) ascending stairs;descending stairs  -      [place] Level/Cues Needed (Stairs Goal 1, PT) supervision required  -      Number of Stairs (Stairs Goal 1, PT) 2 steps  -      Time Frame (Stairs Goal 1, PT) long term goal (LTG);10 days  -AH      Progress/Outcome (Stairs Goal 1, PT) goal met  -                User Key  (r) = Recorded By, (t) = Taken By, (c) = Cosigned By      Initials Name Provider Type Sandrita Palmer PTA Physical Therapist Assistant PT                        PT Discharge Summary  Reason for Discharge: Discharge from facility  Outcomes Achieved: Refer to plan of care for updates on goals achieved  Discharge Destination: Home      Sandrita Wu PTA   2/25/2024

## 2024-02-26 ENCOUNTER — TELEPHONE (OUTPATIENT)
Dept: CARDIAC SURGERY | Facility: CLINIC | Age: 69
End: 2024-02-26
Payer: MEDICARE

## 2024-02-26 ENCOUNTER — READMISSION MANAGEMENT (OUTPATIENT)
Dept: CALL CENTER | Facility: HOSPITAL | Age: 69
End: 2024-02-26
Payer: MEDICARE

## 2024-02-26 LAB
QT INTERVAL: 446 MS
QTC INTERVAL: 430 MS

## 2024-02-26 NOTE — TELEPHONE ENCOUNTER
Patient was discharged home over the weekend and needs 1 week follow-up with me.  Please call patient to schedule

## 2024-02-26 NOTE — OUTREACH NOTE
Prep Survey      Flowsheet Row Responses   [facility] facility patient discharged from? [place]   Is LACE score < 7 ? No   Eligibility Readm Mgmt   Discharge diagnosis Coronary artery disease, CABG   Does the patient have one of the following disease processes/diagnoses(primary or secondary)? Cardiothoracic surgery   Does the patient have Home health ordered? No   Is there a DME ordered? No   Medication alerts for this patient see avs   Prep survey completed? Yes            Yeimy JAUREGUI - Registered Nurse

## 2024-02-27 NOTE — PROGRESS NOTES
Subjective   Chief Complaint   Patient presents with    Post-op Follow-up     Patient had CABG x 4 on 2/20       Patient ID: Atilio Skelton is a 68 y.o. male who is here for follow-up having had CABG x4 (LIMA to LAD, IRENA to OM2 through transverse sinus, SVG to OM 1/[place], SVG to R-PDA) by Dr. Christine on 2/20/2024     History of Present Illness  Mr. Skelton is a 68-year-old male who underwent the above listed procedure by Dr. Christine on 2/20/2024.  Postoperative recovery was significant for 1 brief episode of atrial fibrillation with RVR with subsequent conversion to sinus rhythm with amiodarone protocol.  He met criteria for discharge home on postop day 5.  He was discharged with amiodarone 400 mg daily for 7 days with plans to decrease to 200 mg daily today.  He was given Lasix 20 mg daily for 5 days.  Weight at the time of discharge was at his baseline.  Weight today is down an additional 3 pounds.  He is here today for routine 1 week follow-up.  He states overall he is doing very well.    Post operative recovery was uneventful without any major complications. Sleep habits are fair. Pain control has been good. No fevers/sweats/chills. No drainage from incisions.  No sternal clicks. No chest pain or shortness of breath. Appetite is good.  He is not diabetic and he does not smoke.  Ambulating 10 minutes twice daily.     The following portions of the patient's history were reviewed and updated as appropriate: allergies, current medications, past family history, past medical history, past social history, past surgical history and problem list.    Review of Systems   Constitutional:  Negative for chills, diaphoresis, fatigue and fever.   HENT:  Negative for trouble swallowing and voice change.    Eyes:  Negative for visual disturbance.   Respiratory:  Negative for chest tightness and shortness of breath.    Cardiovascular:  Negative for chest pain, palpitations and leg swelling.   Gastrointestinal:  Negative for  "abdominal pain, diarrhea, nausea and vomiting.   Musculoskeletal:  Negative for arthralgias and myalgias.   Skin:  Negative for color change, pallor, rash and wound.   Neurological:  Negative for dizziness, syncope and light-headedness.   Psychiatric/Behavioral:  Negative for agitation, confusion and sleep disturbance.        Objective   Visit Vitals  /72 (BP Location: Right arm, Patient Position: Sitting, Cuff Size: Adult)   Pulse 67   Ht 177.5 cm (69.88\")   Wt 104 kg (229 lb 12.8 oz)   SpO2 98%   BMI 33.09 kg/m²       Physical Exam  Vitals reviewed.   Constitutional:       General: He is not in acute distress.  HENT:      Head: Normocephalic.   Eyes:      Pupils: Pupils are equal, round, and reactive to light.   Cardiovascular:      Rate and Rhythm: Normal rate and regular rhythm.      Heart sounds: Normal heart sounds. No murmur heard.  Pulmonary:      Breath sounds: Normal breath sounds. No wheezing or rales.   Abdominal:      General: There is no distension.      Palpations: Abdomen is soft.      Tenderness: There is no abdominal tenderness.   Musculoskeletal:         General: No swelling or tenderness.   Skin:     General: Skin is warm and dry.      Comments: Sternum is stable, no clicks.  Sternal incision is clean dry and intact and healing nicely.  Saphenectomy site is clean dry and intact.   Neurological:      General: No focal deficit present.      Mental Status: He is alert and oriented to person, place, and time.   Psychiatric:         Mood and Affect: Mood normal.         Behavior: Behavior normal.         Thought Content: Thought content normal.         Judgment: Judgment normal.             Assessment & Plan       Diagnoses and all orders for this visit:    1. Coronary artery disease of native artery of native heart with stable angina pectoris (Primary)    2. Class 1 obesity with body mass index (BMI) of 33.0 to 33.9 in adult, unspecified obesity type, unspecified whether serious comorbidity " present    3. Non-smoker    Other orders  -     amiodarone (PACERONE) 200 MG tablet; Take 1 tablet by mouth Daily for 14 days.  Dispense: 14 tablet; Refill: 0           Overall, Atilio Skelton is doing well.  Amiodarone has been decreased to 200 mg daily and patient is to take this for 2 more weeks.  Increase ambulation as tolerated.  No further Lasix as patient is below his baseline weight and has no lower extremity edema on exam.  We discussed current sternotomy precautions and how these will not be advanced yet. Following post op cardiac surgery home instructions. Provided support and encouragement. All questions have been answered to the best of my ability. Will discuss starting cardiac rehabilitation at official 1 month post op visit. Patient has follow up with Dr. Christine in a few weeks. Patient has follow up with Cardiology in a few weeks. Patient has been instructed to contact our office with any questions or concerns should they arise prior to the next office visit.  Patient and his wife verbalized understanding.  Overall very happy with his progress.    Atilio Skelton is a non-smoker and therefore does not need tobacco cessation education/counseling.

## 2024-02-28 ENCOUNTER — READMISSION MANAGEMENT (OUTPATIENT)
Dept: CALL CENTER | Facility: HOSPITAL | Age: 69
End: 2024-02-28
Payer: MEDICARE

## 2024-02-28 NOTE — OUTREACH NOTE
CT Surgery Week 1 Survey      Flowsheet Row Responses   [facility] patient discharged from? [place]   Does the patient have one of the following disease processes/diagnoses(primary or secondary)? Cardiothoracic surgery   Week 1 attempt successful? Yes   Call start time 1132   Call end time 1149   Discharge diagnosis Coronary artery disease, CABG   Is patient permission given to speak with other caregiver? Yes   List who call center can speak with wife   Medication alerts for this patient Pt reports that he is using Tylenol for pain at chest incision rates worst since DC has been 5/10, Tylenol drops to 3/10.   Meds reviewed with patient/caregiver? Yes   Is the patient having any side effects they believe may be caused by any medication additions or changes? No   Does the patient have all medications related to this admission filled (includes all antibiotics, pain medications, cardiac medications, etc.) Yes   Is the patient taking all medications as directed (includes completed medication regime)? Yes   Does the patient have a primary care provider?  Yes   Does the patient have an appointment scheduled with their C/T surgeon? Yes   Has the patient kept scheduled appointments due by today? Yes   Comments Pt reports ambulating independently, showering /c shower chair. Pt denies SOA, dizziness, CP or issues tolerating po intake or voiding. Pt is using IS at home and ambulating frequently, he reports. Chest and LE incisions are w/o issue pt reports.   Did the patient receive a copy of their discharge instructions? Yes   Nursing interventions Reviewed instructions with patient   What is the patient's perception of their health status since discharge? Improving   Nursing interventions Nurse provided patient education   Is the patient/caregiver able to teach back normal signs of recovery? Pain or discomfort at incisional site   Nursing interventions Reassured on normal signs of recovery   Is the patient /caregiver able  to teach back basic post-op care? Continue use of incentive spirometry at least 1 week post discharge, Practice cough and deep breath every 4 hours while awake, Hold pillow to support chest when coughing   Is the patient/caregiver able to teach back signs and symptoms of incisional infection? Increased redness, swelling or pain at the incisonal site, Increased drainage or bleeding   Is the patient/caregiver able to teach back steps to recovery at home? Set small, achievable goals for return to baseline health, Rest and rebuild strength, gradually increase activity   If the patient is a current smoker, are they able to teach back resources for cessation? Not a smoker   Is the patient/caregiver able to teach back the hierarchy of who to call/visit for symptoms/problems? PCP, Specialist, Home health nurse, Urgent Care, ED, 911 Yes   Week 1 call completed? Yes   Revoked No further contact(revokes)-requires comment   Call end time [id number]              Evelyn ENGLE - Registered Nurse

## 2024-03-04 ENCOUNTER — OFFICE VISIT (OUTPATIENT)
Dept: CARDIAC SURGERY | Facility: CLINIC | Age: 69
End: 2024-03-04
Payer: MEDICARE

## 2024-03-04 VITALS
OXYGEN SATURATION: 98 % | WEIGHT: 229.8 LBS | BODY MASS INDEX: 32.9 KG/M2 | HEART RATE: 67 BPM | HEIGHT: 70 IN | SYSTOLIC BLOOD PRESSURE: 116 MMHG | DIASTOLIC BLOOD PRESSURE: 72 MMHG

## 2024-03-04 DIAGNOSIS — I25.118 CORONARY ARTERY DISEASE OF NATIVE ARTERY OF NATIVE HEART WITH STABLE ANGINA PECTORIS: Primary | ICD-10-CM

## 2024-03-04 DIAGNOSIS — Z78.9 NON-SMOKER: ICD-10-CM

## 2024-03-04 DIAGNOSIS — E66.9 CLASS 1 OBESITY WITH BODY MASS INDEX (BMI) OF 33.0 TO 33.9 IN ADULT, UNSPECIFIED OBESITY TYPE, UNSPECIFIED WHETHER SERIOUS COMORBIDITY PRESENT: ICD-10-CM

## 2024-03-04 PROCEDURE — 3074F SYST BP LT 130 MM HG: CPT | Performed by: NURSE PRACTITIONER

## 2024-03-04 PROCEDURE — 1160F RVW MEDS BY RX/DR IN RCRD: CPT | Performed by: NURSE PRACTITIONER

## 2024-03-04 PROCEDURE — 99024 POSTOP FOLLOW-UP VISIT: CPT | Performed by: NURSE PRACTITIONER

## 2024-03-04 PROCEDURE — 3078F DIAST BP <80 MM HG: CPT | Performed by: NURSE PRACTITIONER

## 2024-03-04 PROCEDURE — 1159F MED LIST DOCD IN RCRD: CPT | Performed by: NURSE PRACTITIONER

## 2024-03-04 RX ORDER — AMIODARONE HYDROCHLORIDE 200 MG/1
200 TABLET ORAL DAILY
Qty: 14 TABLET | Refills: 0 | Status: SHIPPED | OUTPATIENT
Start: 2024-03-04 | End: 2024-03-18

## 2024-03-26 ENCOUNTER — OFFICE VISIT (OUTPATIENT)
Dept: CARDIOLOGY | Facility: CLINIC | Age: 69
End: 2024-03-26
Payer: MEDICARE

## 2024-03-26 VITALS
OXYGEN SATURATION: 100 % | BODY MASS INDEX: 33.92 KG/M2 | DIASTOLIC BLOOD PRESSURE: 80 MMHG | SYSTOLIC BLOOD PRESSURE: 104 MMHG | HEIGHT: 69 IN | HEART RATE: 63 BPM | WEIGHT: 229 LBS

## 2024-03-26 DIAGNOSIS — I49.3 PVC (PREMATURE VENTRICULAR CONTRACTION): Primary | ICD-10-CM

## 2024-03-26 DIAGNOSIS — I50.22 CHRONIC SYSTOLIC HEART FAILURE: ICD-10-CM

## 2024-03-26 RX ORDER — METOPROLOL SUCCINATE 50 MG/1
50 TABLET, EXTENDED RELEASE ORAL DAILY
Qty: 90 TABLET | Refills: 3 | Status: SHIPPED | OUTPATIENT
Start: 2024-03-26 | End: 2024-03-26

## 2024-03-26 RX ORDER — METOPROLOL SUCCINATE 50 MG/1
50 TABLET, EXTENDED RELEASE ORAL DAILY
Qty: 90 TABLET | Refills: 3 | Status: SHIPPED | OUTPATIENT
Start: 2024-03-26

## 2024-03-26 NOTE — PROGRESS NOTES
"Chief Complaint  PVC (6 MO FU)    Subjective        History of Present Illness    EP Problems:  1.  Frequent PVCs  - Cardiac crux  - ? 23% PVC burden  - 3/2023:  PVC <1% on holter     Cardiology Problems:  1.  CAD status post CABG (2/2024)  - 4/2023:  Stress test with septal infarction, no ischemia, elevated calcium CT score  2.  HTN  3.  Chronic systolic heart failure    Atilio Skelton is a 68 y.o. male with problem list as above who presents to the clinic for follow up of frequent PVCs.  He has been doing well from a PVC standpoint overall.  He states that he had PVCs while he was in the hospital for his bypass surgery that were noticeable, but otherwise has not had any significant palpitations or shortness of breath to suggest increased PVC burden.    Objective   Vital Signs:  /80 (BP Location: Right arm, Patient Position: Sitting)   Pulse 63   Ht 175.3 cm (69\")   Wt 104 kg (229 lb)   SpO2 100%   BMI 33.82 kg/m²   Estimated body mass index is 33.82 kg/m² as calculated from the following:    Height as of this encounter: 175.3 cm (69\").    Weight as of this encounter: 104 kg (229 lb).      Physical Exam  Vitals reviewed.   Constitutional:       Appearance: Normal appearance.   Cardiovascular:      Rate and Rhythm: Normal rate and regular rhythm.      Pulses: Normal pulses.      Heart sounds: Normal heart sounds.      Comments: Sternotomy site appears to be healing well  Pulmonary:      Effort: Pulmonary effort is normal.      Breath sounds: Normal breath sounds.   Musculoskeletal:         General: No swelling.   Neurological:      Mental Status: He is alert and oriented to person, place, and time.   Psychiatric:         Mood and Affect: Mood normal.         Judgment: Judgment normal.        Result Review :  The following data was reviewed by: Malvin Brandon MD on 03/26/2024:      ECG 12 Lead    Date/Time: 3/26/2024 9:09 AM  Performed by: Malvin Brandon MD    Authorized by: Malvin Brandon MD  " Comparison: compared with previous ECG from 2/24/2024  Similar to previous ECG  Rhythm: sinus rhythm  Rate: normal  Conduction: conduction normal  QRS axis: normal  Other findings: non-specific ST-T wave changes    Clinical impression: non-specific ECG              Assessment and Plan   Diagnoses and all orders for this visit:    1. PVC (premature ventricular contraction) (Primary)  -     Holter Monitor - 72 Hour Up To 15 Days; Future    2. Chronic systolic heart failure    Other orders  -     Discontinue: metoprolol succinate XL (TOPROL-XL) 50 MG 24 hr tablet; Take 1 tablet by mouth Daily.  Dispense: 90 tablet; Refill: 3  -     metoprolol succinate XL (TOPROL-XL) 50 MG 24 hr tablet; Take 1 tablet by mouth Daily.  Dispense: 90 tablet; Refill: 3  -     ECG 12 Lead        Atilio Skelton is a 68 y.o. male with problem list as above who presents to the clinic for follow up of frequent PVCs.  We will reassess his overall burden as it has been 1 year since time of his last assessment.  If his burden remains low, continue 1 year follow-up for monitoring.  Will change metoprolol to succinate given his chronic systolic heart failure.    Plan:  -Switch metoprolol to metoprolol succinate 50 mg daily  -Repeat 2-week Holter monitor  -1 year follow-up             Follow Up   Return in about 1 year (around 3/26/2025).  Patient was given instructions and counseling regarding his condition or for health maintenance advice. Please see specific information pulled into the AVS if appropriate.     Part of this note may be an electronic transcription/translation of spoken language to printed text using the Dragon Dictation System.

## 2024-04-08 ENCOUNTER — OFFICE VISIT (OUTPATIENT)
Dept: CARDIAC SURGERY | Facility: CLINIC | Age: 69
End: 2024-04-08
Payer: MEDICARE

## 2024-04-08 VITALS
WEIGHT: 230 LBS | BODY MASS INDEX: 34.07 KG/M2 | DIASTOLIC BLOOD PRESSURE: 77 MMHG | SYSTOLIC BLOOD PRESSURE: 123 MMHG | HEART RATE: 44 BPM | OXYGEN SATURATION: 99 % | HEIGHT: 69 IN

## 2024-04-08 DIAGNOSIS — I25.118 CORONARY ARTERY DISEASE OF NATIVE ARTERY OF NATIVE HEART WITH STABLE ANGINA PECTORIS: Primary | ICD-10-CM

## 2024-04-08 DIAGNOSIS — I25.110 CORONARY ARTERY DISEASE INVOLVING NATIVE CORONARY ARTERY OF NATIVE HEART WITH UNSTABLE ANGINA PECTORIS: Primary | ICD-10-CM

## 2024-04-08 PROCEDURE — 3074F SYST BP LT 130 MM HG: CPT | Performed by: SURGERY

## 2024-04-08 PROCEDURE — 3078F DIAST BP <80 MM HG: CPT | Performed by: SURGERY

## 2024-04-08 PROCEDURE — 99024 POSTOP FOLLOW-UP VISIT: CPT | Performed by: SURGERY

## 2024-04-08 NOTE — PROGRESS NOTES
"[facility] Cardiothoracic Surgery  PROGRESS NOTE      Procedure Performed: CABG x4 (LIMA to LAD, IRENA to OM2 through transverse sinus, SVG to OM 1/Ramus, SVG to R-PDA)    Date of procedure: 02/20/2024    Atilio Skelton is a 68-year-old male who presents for a 6-week cardiac follow-up. He is accompanied by an adult female.    He reports an improvement in his condition compared to his preoperative state. He describes having mild tenderness near his surgical incision area. He denies any leg-related issues, with the exception of numbness. He is scheduled to consult with Dr. Ronnie Tuttle next Monday, 04/15/2024. He seeks advice on resuming activities such as lawn mowing and weed eating. His physical activity includes walking 2 miles without interruption. He has brought the x-ray of his chest for comparison. He expresses a desire to attend cardiac rehabilitation at [facility]. The accompanying adult female inquiries about the initiation of vitamin C and allergy medication, and the duration of his anticoagulant therapy. He is currently taking baby aspirin each morning.     Objective:      04/08/24  0803   Weight: 104 kg (230 lb)      ROS:  No fevers, chills or recent illness. Positive for chest tenderness.        PE:  Visit Vitals  /77 (BP Location: Right arm, Patient Position: Sitting, Cuff Size: Adult)   Pulse (!) 44   Ht 175.3 cm (69\")   Wt 104 kg (230 lb)   SpO2 99%   BMI 33.97 kg/m²        GENERAL: NAD, resting comfortably, normal color  CARDIOVASCULAR: regular, regular rate, sinus, well-healed sternotomy with stable sternum  PULMONARY: Normal bilateral breath sounds, no labored breathing  ABDOMEN: soft, nt/nd  EXTREMITIES: mild peripheral edema, normal pulses, normal ROM               Lab Results (last 72 hours)      ** No results found for the last 72 hours. **             CABG x4 (LIMA to LAD, IRENA to OM2 through transverse sinus, SVG to OM 1/Ramus, SVG to R-PDA)    Date of " procedure: 02/20/2024     Assessment/Plan         Mr. Skelton is a 68-year-old male who is now 6 weeks status post coronary artery bypass grafting x4. The patient's postoperative recovery is commendable, with no recurrent anginal-type symptoms. His heart failure symptoms have significantly improved, enabling him to walk over 2 miles without any complications. There have been no issues with wound healing. A follow-up appointment with Dr. Ronnie Tuttle is scheduled for the following week. The patient's progress is commendable.     We have discussed the possibility of gradually resuming regular activities on a progressive basis. The patient comprehends and consents to the approval of driving and mowing his yard. The continuation of DAPT for a minimum of 3 months is recommended, after which it can be transitioned to aspirin only.     A referral to [facility] cardiac rehabilitation will be made. Thank you for trusting me with the care of Mr. Skelton. Please do not hesitate to call with any questions or concerns.       Jose Luis Christine MD   Cardiothoracic Surgeon     Transcribed from ambient dictation for Jose Luis Christine MD by Aryan Siddiqui.  04/08/24   09:06 CDT    Patient or patient representative verbalized consent to the visit recording.  I have personally performed the services described in this document as transcribed by the above individual, and it is both accurate and complete.

## 2024-04-08 NOTE — LETTER
April 9, 2024       No Recipients    Patient: Atilio Skelton   YOB: 1955   Date of Visit: 4/8/2024       Dear JUAN JOSE Gottlieb,    Atilio Skelton was in my office today. Below are the relevant portions of my assessment and plan of care.    Mr. Skelton is a 68-year-old male who is now 6 weeks status post coronary artery bypass grafting x4. The patient's postoperative recovery is commendable, with no recurrent anginal-type symptoms. His heart failure symptoms have significantly improved, enabling him to walk over 2 miles without any complications. There have been no issues with wound healing. A follow-up appointment with Dr. Ronnie Tuttle is scheduled for the following week. The patient's progress is commendable.     We have discussed the possibility of gradually resuming regular activities on a progressive basis. The patient comprehends and consents to the approval of driving and mowing his yard. The continuation of DAPT for a minimum of 3 months is recommended, after which it can be transitioned to aspirin only.     A referral to [facility] cardiac rehabilitation will be made. Thank you for trusting me with the care of Mr. Skelton. Please do not hesitate to call with any questions or concerns.         Sincerely,        Jose Luis Christine MD        CC:   No Recipients

## 2024-04-23 ENCOUNTER — TELEPHONE (OUTPATIENT)
Dept: CARDIOLOGY | Facility: CLINIC | Age: 69
End: 2024-04-23

## 2024-04-23 NOTE — TELEPHONE ENCOUNTER
Caller: Atilio Skelton    Relationship: Self    Best call back number: [phone]     Caller requesting test results: ATILIO     What test was performed: 4.19.24     When was the test performed: HOLTER MONITOR     Where was the test performed: [facility]     Additional notes:

## 2024-04-23 NOTE — TELEPHONE ENCOUNTER
Caller: Atilio Skelton    Relationship: Self    Best call back number: [phone]     What is the best time to reach you: ANYTIME     Who are you requesting to speak with (clinical staff, provider,  specific staff member): ANYONE    Do you know the name of the person who called: ATILIO     What was the call regarding: PATIENT STATES HE RECEIVED A MESSAGE FROM [employer] [facility] SAYING THEY ARE SENDING HIM A PATCH TO WEAR. PATIENT IS CONFUSED AS HE COMPLETED HIS HOLTER MONITOR.     Is it okay if the provider responds through [employer]: NO, PLEASE CALL.

## 2024-04-25 ENCOUNTER — TELEPHONE (OUTPATIENT)
Dept: CARDIOLOGY | Facility: CLINIC | Age: 69
End: 2024-04-25
Payer: MEDICARE

## 2024-04-25 NOTE — TELEPHONE ENCOUNTER
Malvin Brandon MD Cooper, Shelby R, MA  Could you let him know that the frequency of his PVCs have increased again.  I do think for now, as we talked about in the clinic, is reasonable to continue to observe these without more aggressive intervention.  If he starts having more symptoms in the future, could certainly consider more aggressive interventions sooner.      Patient notified, states he is in Cardiac rehab and says when he is exercising his PVC start to decrease and wondering if that's normal?

## 2024-05-13 RX ORDER — CLOPIDOGREL BISULFATE 75 MG/1
75 TABLET ORAL DAILY
Qty: 90 TABLET | Refills: 3 | Status: SHIPPED | OUTPATIENT
Start: 2024-05-13

## 2024-05-20 ENCOUNTER — TELEPHONE (OUTPATIENT)
Dept: CARDIOLOGY | Facility: CLINIC | Age: 69
End: 2024-05-20
Payer: MEDICARE

## 2024-05-20 NOTE — TELEPHONE ENCOUNTER
Patient called in stating that Dr. Christine prescribed him Plavix and was told he only had to continue it for 3 months and after 3 months he will take Asprin indefinitely. Are you ok with this, or would you like patient to continue Plavix? We sent in refill, but I was not aware at the time that it was only for 3 months. Please advise.

## 2024-05-21 ENCOUNTER — TELEPHONE (OUTPATIENT)
Dept: CARDIOLOGY | Facility: CLINIC | Age: 69
End: 2024-05-21

## 2024-05-21 NOTE — TELEPHONE ENCOUNTER
Caller: Atilio Skelton    Relationship: Self    Best call back number: [phone]     What is the best time to reach you: ANYTIME    Who are you requesting to speak with (clinical staff, provider,  specific staff member): SILVER    Do you know the name of the person who called: ATILIO    What was the call regarding: PT HAD OPEN HEART SURGERY IN FEBRUARY. DR. DAVISON TOOK HIM OFF OF BLOOD THINNERS. PHARMACY TOLD HIM THAT DR. DHILLON ORDERED ANOTHER SUPPLY OF BLOOD THINNER. HE STATES THAT SILVER WAS SUPPOSED TO TELL HIM ABOUT THIS. STATED SILVER CALLED EARLIER BUT THEY GOT CUT OFF     Is it okay if the provider responds through [employer]: DOES NOT HAVE

## 2025-03-28 ENCOUNTER — OFFICE VISIT (OUTPATIENT)
Dept: CARDIOLOGY | Facility: CLINIC | Age: 70
End: 2025-03-28
Payer: MEDICARE

## 2025-03-28 VITALS
SYSTOLIC BLOOD PRESSURE: 118 MMHG | HEIGHT: 69 IN | HEART RATE: 73 BPM | DIASTOLIC BLOOD PRESSURE: 72 MMHG | WEIGHT: 236 LBS | BODY MASS INDEX: 34.96 KG/M2

## 2025-03-28 DIAGNOSIS — I49.3 PVC (PREMATURE VENTRICULAR CONTRACTION): ICD-10-CM

## 2025-03-28 DIAGNOSIS — I50.22 CHRONIC SYSTOLIC HEART FAILURE: Primary | ICD-10-CM

## 2025-03-28 PROCEDURE — 3074F SYST BP LT 130 MM HG: CPT | Performed by: STUDENT IN AN ORGANIZED HEALTH CARE EDUCATION/TRAINING PROGRAM

## 2025-03-28 PROCEDURE — 99214 OFFICE O/P EST MOD 30 MIN: CPT | Performed by: STUDENT IN AN ORGANIZED HEALTH CARE EDUCATION/TRAINING PROGRAM

## 2025-03-28 PROCEDURE — G2211 COMPLEX E/M VISIT ADD ON: HCPCS | Performed by: STUDENT IN AN ORGANIZED HEALTH CARE EDUCATION/TRAINING PROGRAM

## 2025-03-28 PROCEDURE — 1159F MED LIST DOCD IN RCRD: CPT | Performed by: STUDENT IN AN ORGANIZED HEALTH CARE EDUCATION/TRAINING PROGRAM

## 2025-03-28 PROCEDURE — 3078F DIAST BP <80 MM HG: CPT | Performed by: STUDENT IN AN ORGANIZED HEALTH CARE EDUCATION/TRAINING PROGRAM

## 2025-03-28 PROCEDURE — 1160F RVW MEDS BY RX/DR IN RCRD: CPT | Performed by: STUDENT IN AN ORGANIZED HEALTH CARE EDUCATION/TRAINING PROGRAM

## 2025-03-28 RX ORDER — METOPROLOL SUCCINATE 50 MG/1
50 TABLET, EXTENDED RELEASE ORAL DAILY
Qty: 90 TABLET | Refills: 3 | Status: SHIPPED | OUTPATIENT
Start: 2025-03-28

## 2025-04-07 NOTE — PROGRESS NOTES
"Chief Complaint  PVC    Subjective        History of Present Illness    EP Problems:  1.  Frequent PVCs  - Cardiac crux  - ? 23% PVC burden  - 3/2023:  PVC <1% on holter     Cardiology Problems:  1.  CAD status post CABG (2/2024)  - 4/2023:  Stress test with septal infarction, no ischemia, elevated calcium CT score  2.  HTN  3.  Chronic systolic heart failure    History of Present Illness  The patient is a 69-year-old male who presents to the clinic for follow-up of frequent PVCs and PVC-induced cardiomyopathy.    He maintains an active lifestyle, engaging in daily walks of approximately 2.5 to 3 miles with a group of women. He reports no significant symptoms during these activities. His sleep pattern is regular, with a duration of 10 to 12 hours per night. He underwent cataract surgery on 01/30/2025, during which his heart rate was noted to be 38 beats per minute. He has a known history of PVCs. He also has a history of quadruple bypass surgery.      Objective   Vital Signs:  /72   Pulse 73   Ht 175.3 cm (69\")   Wt 107 kg (236 lb)   BMI 34.85 kg/m²   Estimated body mass index is 34.85 kg/m² as calculated from the following:    Height as of this encounter: 175.3 cm (69\").    Weight as of this encounter: 107 kg (236 lb).      Physical Exam     Physical Exam  The patient is overweight and in no acute distress.  Lungs are clear to auscultation bilaterally.  Heart rhythm is regularly irregular.  No significant lower extremity edema.    Result Review :  The following data was reviewed by: Malvin Brandon MD on today's date:             Assessment and Plan   Diagnoses and all orders for this visit:    1. Chronic systolic heart failure (Primary)  -     Adult Transthoracic Echo Limited W/ Cont if Necessary Per Protocol; Future    2. PVC (premature ventricular contraction)  -     Adult Transthoracic Echo Limited W/ Cont if Necessary Per Protocol; Future    Other orders  -     metoprolol succinate XL (TOPROL-XL) 50 MG " 24 hr tablet; Take 1 tablet by mouth Daily.  Dispense: 90 tablet; Refill: 3        Assessment & Plan  1. Premature ventricular contractions (PVCs)  - Reports minimal symptoms, primarily occurring at night when going to bed  - Discussed potential need for ablation therapy, including risks and benefits  - Echocardiogram to be scheduled within the next month to monitor cardiac function  - Continue current management plan if echocardiogram results are stable  - Consider ablation therapy if there is a significant decline in cardiac function or worsening symptoms    2. PVC-associated cardiomyopathy  - History of minor cardiac weakness observed in echocardiogram last year  - Discussed relationship between PVCs and cardiac weakness  - Upcoming echocardiogram to determine progression  - No immediate changes if echocardiogram shows stable cardiac function  - Consider more aggressive treatment options, including ablation, if there is a significant decline    PROCEDURE  The patient underwent cataract surgery on 01/30/2025. He also has a history of quadruple bypass surgery.    Long term follow up in our clinic for PVCs and PVC associated CM             Follow Up   Return in about 6 months (around 9/28/2025).  Patient was given instructions and counseling regarding his condition or for health maintenance advice. Please see specific information pulled into the AVS if appropriate.     Part of this note may be an electronic transcription/translation of spoken language to printed text using the Dragon Dictation System.    Patient or patient representative verbalized consent for the use of Ambient Listening during the visit with  Malvin Brandon MD for chart documentation. 4/7/2025  02:35 CDT

## 2025-05-16 ENCOUNTER — HOSPITAL ENCOUNTER (OUTPATIENT)
Dept: CARDIOLOGY | Facility: HOSPITAL | Age: 70
Discharge: HOME | End: 2025-05-16
Payer: MEDICARE

## 2025-05-16 VITALS
WEIGHT: 236 LBS | HEIGHT: 69 IN | DIASTOLIC BLOOD PRESSURE: 72 MMHG | SYSTOLIC BLOOD PRESSURE: 118 MMHG | BODY MASS INDEX: 34.96 KG/M2

## 2025-05-16 DIAGNOSIS — I50.22 CHRONIC SYSTOLIC HEART FAILURE: ICD-10-CM

## 2025-05-16 DIAGNOSIS — I49.3 PVC (PREMATURE VENTRICULAR CONTRACTION): ICD-10-CM

## 2025-05-16 LAB
BH CV ECHO MEAS - EDV(CUBED): 160.1 ML
BH CV ECHO MEAS - EDV(MOD-SP2): 57.9 ML
BH CV ECHO MEAS - EDV(MOD-SP4): 124 ML
BH CV ECHO MEAS - EF(MOD-SP2): 57.7 %
BH CV ECHO MEAS - EF(MOD-SP4): 59 %
BH CV ECHO MEAS - ESV(CUBED): 63.5 ML
BH CV ECHO MEAS - ESV(MOD-SP2): 24.5 ML
BH CV ECHO MEAS - ESV(MOD-SP4): 50.8 ML
BH CV ECHO MEAS - FS: 26.5 %
BH CV ECHO MEAS - IVS/LVPW: 0.98 CM
BH CV ECHO MEAS - IVSD: 0.94 CM
BH CV ECHO MEAS - LA DIMENSION: 5 CM
BH CV ECHO MEAS - LV DIASTOLIC VOL/BSA (35-75): 56 CM2
BH CV ECHO MEAS - LV MASS(C)D: 193.5 GRAMS
BH CV ECHO MEAS - LV SYSTOLIC VOL/BSA (12-30): 22.9 CM2
BH CV ECHO MEAS - LVIDD: 5.4 CM
BH CV ECHO MEAS - LVIDS: 4 CM
BH CV ECHO MEAS - LVPWD: 0.95 CM
BH CV ECHO MEAS - PA V2 MAX: 84.9 CM/SEC
BH CV ECHO MEAS - PI END-D VEL: 89.6 CM/SEC
BH CV ECHO MEAS - RAP SYSTOLE: 8 MMHG
BH CV ECHO MEAS - RVSP: 43 MMHG
BH CV ECHO MEAS - SV(MOD-SP2): 33.4 ML
BH CV ECHO MEAS - SV(MOD-SP4): 73.2 ML
BH CV ECHO MEAS - SVI(MOD-SP2): 15.1 ML/M2
BH CV ECHO MEAS - SVI(MOD-SP4): 33 ML/M2
BH CV ECHO MEAS - TR MAX PG: 35 MMHG
BH CV ECHO MEAS - TR MAX VEL: 296 CM/SEC
LEFT ATRIUM VOLUME INDEX: 33.6 ML/M2
LEFT ATRIUM VOLUME: 74.5 ML
LV EF BIPLANE MOD: 58.2 %

## 2025-05-16 PROCEDURE — 93325 DOPPLER ECHO COLOR FLOW MAPG: CPT

## 2025-05-16 PROCEDURE — 93308 TTE F-UP OR LMTD: CPT

## 2025-05-16 PROCEDURE — 93321 DOPPLER ECHO F-UP/LMTD STD: CPT

## 2025-05-16 PROCEDURE — 25510000001 PERFLUTREN 6.52 MG/ML SUSPENSION: Performed by: STUDENT IN AN ORGANIZED HEALTH CARE EDUCATION/TRAINING PROGRAM

## 2025-05-16 RX ADMIN — PERFLUTREN 19.56 MG: 6.52 INJECTION, SUSPENSION INTRAVENOUS at 12:01

## 2025-05-22 ENCOUNTER — TELEPHONE (OUTPATIENT)
Dept: CARDIOLOGY | Facility: CLINIC | Age: 70
End: 2025-05-22

## 2025-05-22 NOTE — TELEPHONE ENCOUNTER
Caller: Atilio Skelton    Relationship: Self    Best call back number: [phone]     Caller requesting test results: PATIENT     What test was performed: ECHO     When was the test performed: 5/16/25    Where was the test performed: [facility]                Alert and oriented to person, place and time

## 2025-05-31 LAB
BH CV ECHO MEAS - EDV(CUBED): 160.1 ML
BH CV ECHO MEAS - EDV(MOD-SP2): 57.9 ML
BH CV ECHO MEAS - EDV(MOD-SP4): 124 ML
BH CV ECHO MEAS - EF(MOD-SP2): 57.7 %
BH CV ECHO MEAS - EF(MOD-SP4): 59 %
BH CV ECHO MEAS - ESV(CUBED): 63.5 ML
BH CV ECHO MEAS - ESV(MOD-SP2): 24.5 ML
BH CV ECHO MEAS - ESV(MOD-SP4): 50.8 ML
BH CV ECHO MEAS - FS: 26.5 %
BH CV ECHO MEAS - IVS/LVPW: 0.98 CM
BH CV ECHO MEAS - IVSD: 0.94 CM
BH CV ECHO MEAS - LA DIMENSION: 5 CM
BH CV ECHO MEAS - LV DIASTOLIC VOL/BSA (35-75): 56 CM2
BH CV ECHO MEAS - LV MASS(C)D: 193.5 GRAMS
BH CV ECHO MEAS - LV SYSTOLIC VOL/BSA (12-30): 22.9 CM2
BH CV ECHO MEAS - LVIDD: 5.4 CM
BH CV ECHO MEAS - LVIDS: 4 CM
BH CV ECHO MEAS - LVPWD: 0.95 CM
BH CV ECHO MEAS - PA V2 MAX: 84.9 CM/SEC
BH CV ECHO MEAS - PI END-D VEL: 89.6 CM/SEC
BH CV ECHO MEAS - RAP SYSTOLE: 8 MMHG
BH CV ECHO MEAS - RVSP: 43 MMHG
BH CV ECHO MEAS - SV(MOD-SP2): 33.4 ML
BH CV ECHO MEAS - SV(MOD-SP4): 73.2 ML
BH CV ECHO MEAS - SVI(MOD-SP2): 15.1 ML/M2
BH CV ECHO MEAS - SVI(MOD-SP4): 33 ML/M2
BH CV ECHO MEAS - TR MAX PG: 35 MMHG
BH CV ECHO MEAS - TR MAX VEL: 296 CM/SEC
LEFT ATRIUM VOLUME INDEX: 33.6 ML/M2
LEFT ATRIUM VOLUME: 74.5 ML
LV EF BIPLANE MOD: 58.2 %

## (undated) DEVICE — ST TBG PNEUMOCLEAR EVAC SMOKE HIFLO

## (undated) DEVICE — DEV COMPR RAD TR BND LNG 29CM

## (undated) DEVICE — KT CONTRST INJ ISOL/MANFLD W/TRANSDCR

## (undated) DEVICE — SUT EPACK PAD 9889E

## (undated) DEVICE — ELECTRD BLD MEGADYNE EZCLEAN STD 2.75IN XLNG

## (undated) DEVICE — PK CATH CARD 30 CA/4

## (undated) DEVICE — KT CONTRL HND ACIST CVI PREM HIPRESS 65

## (undated) DEVICE — INTRO GLIDESHEATH SLENDER SS 21G .021 6F 10CM 45CM

## (undated) DEVICE — SUT VIC 0 CT1 36IN UD VCP946H

## (undated) DEVICE — PK OPN HEART 30

## (undated) DEVICE — CANN NASL ETCO2 LO/FLO A/

## (undated) DEVICE — BLD SCLPL BEAVR MINI STR 2BVL 180D LF

## (undated) DEVICE — PAD GRND SURFIT 2 W/CORD A/ 10FT DISP

## (undated) DEVICE — KT NDL GUIDE STRL 18GA

## (undated) DEVICE — GW STARTER FXD CORE J .035 3X260CM 3MM

## (undated) DEVICE — GLV SURG BIOGEL LTX PF 6 1/2

## (undated) DEVICE — PK SET UP ANES OPN HEART 30

## (undated) DEVICE — SUP ARMBRD ART/LINE BLU

## (undated) DEVICE — SYR LUERLOK 20CC BX/50

## (undated) DEVICE — Device

## (undated) DEVICE — DRP SURG BRACH ANGIO 38X44IN STRL

## (undated) DEVICE — SYS DISTNTION VEIN BONCHEK 300MMHG

## (undated) DEVICE — SUT SILK 2/0 SH CR8 18IN CR8 C012D

## (undated) DEVICE — SUT ETHIB 2/0 SH SH 36IN X523H

## (undated) DEVICE — SUT PROLN 4/0 RB1 36IN 4PK M8557

## (undated) DEVICE — CLIP SPRNG OCCL LATIS STEALTH 1/2FORCE 6MM

## (undated) DEVICE — TB SXN SURG DLP MALL/CARD SFT/TP 6F 6IN

## (undated) DEVICE — SOLIDIFIER LIQUI LOC PLUS 2000CC

## (undated) DEVICE — ADHS LIQ MASTISOL 2/3ML

## (undated) DEVICE — KT OR TURNOVER BH PAD

## (undated) DEVICE — GLV SURG BIOGEL M LTX PF 7 1/2

## (undated) DEVICE — STRIP CLS WND CURAD MEDI/STRIP HYPOALLERG 0.5X4IN STRL PK/6

## (undated) DEVICE — SURGIPAW JAW CVR

## (undated) DEVICE — SYS VASOVIEW HEMOPRO ENDOSCOPIC HARVST VESL

## (undated) DEVICE — TRAP FLD MINIVAC MEGADYNE 100ML

## (undated) DEVICE — BLD STERNALOCK XP ZDRIVE

## (undated) DEVICE — PUNCH SURG AORT ROT 4MM STRL

## (undated) DEVICE — DRSNG WND SIL OPTIFOAM GNTL BRDR ADHS 1.6X2IN

## (undated) DEVICE — MARKR SKIN W/RULR AND LBL

## (undated) DEVICE — CATH DIAG IMPULSE PIG145 5F 110CM

## (undated) DEVICE — DRP SLUSH MACH OM-ORS-320

## (undated) DEVICE — CLTH CLENS READYCLEANSE PERI CARE PK/5

## (undated) DEVICE — CATH CORNRY OPTITORQUE 1SH TR4 5F 100

## (undated) DEVICE — ELECTRD BLD EZ CLN MOD 4IN

## (undated) DEVICE — BG OR ZSUT SADDLE 20IN CLR STRL

## (undated) DEVICE — KT ANTI FOG W/FLD AND SPNG

## (undated) DEVICE — DRSNG SURESITE123 4X10IN

## (undated) DEVICE — SUT SILK 2/0 FS BLK 18IN 685G

## (undated) DEVICE — ELECTRD PAD DEFIB RADOLCNT M/FUNC PHYSIOCONTRL CONN/LD/IN A/

## (undated) DEVICE — DRSNG WND SIL OPTIFOAM SUPRABS P/OP LF 4X12IN

## (undated) DEVICE — SOL IRR NACL 0.9PCT BT 1000ML

## (undated) DEVICE — BLD SAW STERN 32MM